# Patient Record
Sex: MALE | Race: WHITE | Employment: FULL TIME | URBAN - METROPOLITAN AREA
[De-identification: names, ages, dates, MRNs, and addresses within clinical notes are randomized per-mention and may not be internally consistent; named-entity substitution may affect disease eponyms.]

---

## 2017-01-03 ENCOUNTER — TELEPHONE (OUTPATIENT)
Dept: INTERNAL MEDICINE CLINIC | Age: 47
End: 2017-01-03

## 2017-01-03 ENCOUNTER — TELEPHONE (OUTPATIENT)
Dept: CARDIOLOGY CLINIC | Age: 47
End: 2017-01-03

## 2017-01-03 NOTE — TELEPHONE ENCOUNTER
Patient would like someone to call concerning his Duplex Carotid Bilateral test he had done on the 12/27/2016

## 2017-01-03 NOTE — TELEPHONE ENCOUNTER
Pt called to inform Dr. Tahira Vaughan that he took atenolol for 3 days but stopped because he felt fatigue, chest discomfort and \"a panicky feeling\". I will forward to Dr. Tahira Vaughan for further advisement.

## 2017-01-06 ENCOUNTER — HOSPITAL ENCOUNTER (OUTPATIENT)
Dept: NON INVASIVE DIAGNOSTICS | Age: 47
Discharge: HOME | End: 2017-01-06
Attending: INTERNAL MEDICINE
Payer: COMMERCIAL

## 2017-01-06 DIAGNOSIS — R06.09 DOE (DYSPNEA ON EXERTION): ICD-10-CM

## 2017-01-06 DIAGNOSIS — R07.9 CHEST PAIN, UNSPECIFIED TYPE: ICD-10-CM

## 2017-01-06 PROCEDURE — 74011250636 HC RX REV CODE- 250/636: Performed by: INTERNAL MEDICINE

## 2017-01-06 PROCEDURE — A9500 TC99M SESTAMIBI: HCPCS

## 2017-01-06 PROCEDURE — C8929 TTE W OR WO FOL WCON,DOPPLER: HCPCS

## 2017-01-06 PROCEDURE — 78452 HT MUSCLE IMAGE SPECT MULT: CPT | Performed by: INTERNAL MEDICINE

## 2017-01-06 PROCEDURE — 93017 CV STRESS TEST TRACING ONLY: CPT | Performed by: INTERNAL MEDICINE

## 2017-01-06 RX ORDER — SODIUM CHLORIDE 9 MG/ML
250 INJECTION, SOLUTION INTRAVENOUS ONCE
Status: COMPLETED | OUTPATIENT
Start: 2017-01-06 | End: 2017-01-06

## 2017-01-06 RX ADMIN — REGADENOSON 0.4 MG: 0.08 INJECTION, SOLUTION INTRAVENOUS at 09:00

## 2017-01-06 RX ADMIN — SODIUM CHLORIDE 250 ML: 900 INJECTION, SOLUTION INTRAVENOUS at 09:00

## 2017-01-06 RX ADMIN — PERFLUTREN 2 ML: 6.52 INJECTION, SUSPENSION INTRAVENOUS at 10:03

## 2017-01-06 NOTE — PROCEDURES
[address]  MEDICINE CARDIAC STRESS    Name:  Daron Michelle  MR#:  [id number]  :  1970  Account #:  [de-identified]  Date of Adm:  2017  Date of Service:  2017      PROCEDURES PERFORMED: Pharmacologic nuclear scan. BASELINE ELECTROCARDIOGRAM: Shows left bundle branch block  pattern. The patient has an IV in the right antecubital space. He received  Lexiscan per protocol. He tolerated the procedure well. No chest pain  was noted. He received resting dose of sestamibi 10.33 mCi at 8:05  a.m. He received stress dose of sestamibi 33.0 mCi at 9:05 a.m. TREADMILL CONCLUSIONS  1. ST segment changes: None. 2. Chest pain: None. 3. Dysrhythmia: None. 4. Both heart rate and blood pressure response are normal.    TREADMILL CONCLUSION: This is a nondiagnostic pharmacologic  stress test from an electrocardiographic standpoint due to underlying  left bundle branch block pattern. MYOCARDIAL NUCLEAR PERFUSION STUDY FINDINGS  1. Perfusion imaging shows mostly diminished uptake in the inferior  wall noticeable during both stress phase and rest phase. This can be  consistent with prior inferior injury versus diaphragmatic attenuation. There was no significant reversibility noted. 2. Gated SPECT imaging shows normal left ventricular chamber size  and mildly diminished LV function with estimated ejection fraction of  43%. There is inferior hypokinesis noted. CONCLUSIONS  1. Nondiagnostic pharmacologic stress test from an electric  electrocardiographic standpoint. 2. Perfusion imaging shows no significant reversibility; however, there  is mostly fixed inferior defect, which can be consistent with prior inferior  injury versus diaphragmatic attenuation. 3. Gated SPECT imaging shows normal left ventricular chamber size  and mildly diminished left ventricular function with estimated ejection  fraction of 43%. There is inferior hypokinesis noted.   4. This is an intermediate to high risk finding.         Eulalio Dance, MD Areta Earthly / Riley.Phyllis  D:  01/06/2017   15:04  T:  01/06/2017   16:13  Job #:  [id number]

## 2017-01-06 NOTE — PROGRESS NOTES
Complete 2D echocardiogram study was performed on patient. Report to follow. Patient armband was removed before discharging.

## 2017-01-06 NOTE — PROGRESS NOTES
Patient was given 10.33 mCi of Sestamibi for the Resting pictures. Patient received 0.4 mg of Lexiscan for the exercise portion of the Stress test. Patient was then given 33 mCi of Sestamibi for the Stress pictures. Armband was removed and disposed of before the patient left.

## 2017-01-07 LAB
ATTENDING PHYSICIAN, CST07: NORMAL
DIAGNOSIS, 93000: NORMAL
DUKE TM SCORE RESULT, CST14: NORMAL
DUKE TREADMILL SCORE, CST13: NORMAL
ECG INTERP BEFORE EX, CST11: NORMAL
ECG INTERP DURING EX, CST12: NORMAL
FUNCTIONAL CAPACITY, CST17: NORMAL
KNOWN CARDIAC CONDITION, CST08: NORMAL
MAX. DIASTOLIC BP, CST04: 77 MMHG
MAX. HEART RATE, CST05: 112 BPM
MAX. SYSTOLIC BP, CST03: 159 MMHG
OVERALL BP RESPONSE TO EXERCISE, CST16: NORMAL
OVERALL HR RESPONSE TO EXERCISE, CST15: NORMAL
PEAK EX METS, CST10: 1 METS
PROTOCOL NAME, CST01: NORMAL
TEST INDICATION, CST09: NORMAL

## 2017-01-11 NOTE — PROGRESS NOTES
Per your last note \" Chest pain. There is definite an exertional component. He did have normal coronaries on a cardiac catheterization in 2012, however, with his new symptoms over the past month, recommend a pharmacologic nuclear stress test for further risk stratification. This may be all related to elevated blood pressure as well since he was just started on the amlodipine a month ago.

## 2017-01-11 NOTE — PROGRESS NOTES
Per your last note \" Dyspnea on exertion. A cardiac etiology will be evaluated for with the stress test as described above and also an echocardiogram will be arranged to reevaluate his PA pressures.

## 2017-01-17 DIAGNOSIS — I10 ESSENTIAL HYPERTENSION: ICD-10-CM

## 2017-01-17 RX ORDER — AMLODIPINE BESYLATE 10 MG/1
10 TABLET ORAL DAILY
Qty: 30 TAB | Refills: 0 | Status: SHIPPED | OUTPATIENT
Start: 2017-01-17 | End: 2017-02-16 | Stop reason: ALTCHOICE

## 2017-01-17 NOTE — TELEPHONE ENCOUNTER
Requested Prescriptions     Pending Prescriptions Disp Refills    amLODIPine (NORVASC) 10 mg tablet 30 Tab 0     Sig: Take 1 Tab by mouth daily.

## 2017-01-23 ENCOUNTER — TELEPHONE (OUTPATIENT)
Dept: CARDIOLOGY CLINIC | Age: 47
End: 2017-01-23

## 2017-01-23 NOTE — TELEPHONE ENCOUNTER
----- Message from Mayte Cain MD sent at 1/12/2017  1:11 PM EST -----  Please let the patient know that his heart function was lower limits of normal on his studies. There is no evidence of ischemia on his stress test  ----- Message -----     From: Quinn Garcia RN     Sent: 1/11/2017   7:16 AM       To: Mayte Cain MD    Per your last note \" Dyspnea on exertion.  A cardiac etiology will be evaluated for with the stress test as described above and also an echocardiogram will be arranged to reevaluate his PA pressures.

## 2017-01-23 NOTE — TELEPHONE ENCOUNTER
Pt aware of results    Pt states he is still having issues with waking up with his heart pounding and also having spikes in his blood pressure.    I will forward this information to Dr Jace Su

## 2017-02-10 ENCOUNTER — OFFICE VISIT (OUTPATIENT)
Dept: INTERNAL MEDICINE CLINIC | Age: 47
End: 2017-02-10

## 2017-02-10 ENCOUNTER — HOSPITAL ENCOUNTER (OUTPATIENT)
Dept: LAB | Age: 47
Discharge: HOME | End: 2017-02-10
Payer: COMMERCIAL

## 2017-02-10 VITALS
TEMPERATURE: 97 F | BODY MASS INDEX: 35.82 KG/M2 | OXYGEN SATURATION: 97 % | HEIGHT: 65 IN | WEIGHT: 215 LBS | DIASTOLIC BLOOD PRESSURE: 98 MMHG | HEART RATE: 83 BPM | RESPIRATION RATE: 16 BRPM | SYSTOLIC BLOOD PRESSURE: 160 MMHG

## 2017-02-10 DIAGNOSIS — G89.29 CHRONIC RIGHT-SIDED LOW BACK PAIN WITHOUT SCIATICA: ICD-10-CM

## 2017-02-10 DIAGNOSIS — M54.50 CHRONIC RIGHT-SIDED LOW BACK PAIN WITHOUT SCIATICA: ICD-10-CM

## 2017-02-10 DIAGNOSIS — R60.0 BILATERAL EDEMA OF LOWER EXTREMITY: Primary | ICD-10-CM

## 2017-02-10 DIAGNOSIS — I10 ESSENTIAL HYPERTENSION: ICD-10-CM

## 2017-02-10 LAB
APPEARANCE UR: CLEAR
BACTERIA URNS QL MICRO: NEGATIVE /HPF
BILIRUB UR QL: NEGATIVE
COLOR UR: YELLOW
EPITH CASTS URNS QL MICRO: NORMAL /LPF (ref 0–5)
GLUCOSE UR STRIP.AUTO-MCNC: NEGATIVE MG/DL
HGB UR QL STRIP: NEGATIVE
KETONES UR QL STRIP.AUTO: NEGATIVE MG/DL
LEUKOCYTE ESTERASE UR QL STRIP.AUTO: NEGATIVE
NITRITE UR QL STRIP.AUTO: NEGATIVE
PH UR STRIP: 6.5 [PH] (ref 5–8)
PROT UR STRIP-MCNC: NEGATIVE MG/DL
RBC #/AREA URNS HPF: NORMAL /HPF (ref 0–5)
SP GR UR REFRACTOMETRY: 1.01 (ref 1–1.03)
UROBILINOGEN UR QL STRIP.AUTO: 0.2 EU/DL (ref 0.2–1)
WBC URNS QL MICRO: NORMAL /HPF (ref 0–4)

## 2017-02-10 PROCEDURE — 81001 URINALYSIS AUTO W/SCOPE: CPT | Performed by: INTERNAL MEDICINE

## 2017-02-10 RX ORDER — BENAZEPRIL HYDROCHLORIDE AND HYDROCHLOROTHIAZIDE 20; 12.5 MG/1; MG/1
TABLET ORAL
Qty: 30 TAB | Refills: 2 | Status: SHIPPED | OUTPATIENT
Start: 2017-02-10 | End: 2017-07-20 | Stop reason: CLARIF

## 2017-02-10 RX ORDER — LABETALOL 100 MG/1
TABLET, FILM COATED ORAL
Qty: 60 TAB | Refills: 2 | Status: SHIPPED | OUTPATIENT
Start: 2017-02-10 | End: 2017-07-31 | Stop reason: SDUPTHER

## 2017-02-10 NOTE — PROGRESS NOTES
1. Have you been to the ER, urgent care clinic since your last visit? Hospitalized since your last visit? No    2. Have you seen or consulted any other health care providers outside of the [address] [place] [address] since your last visit? Include any pap smears or colon screening.  No

## 2017-02-10 NOTE — PROGRESS NOTES
The patient presents to the office today with the chief complaint of LE Swelling    HPI    The patient remains on medications for Hypertension. he is tolerating the medications well but he has developed swelling in his legs since on Norvasc. The patient also have chronic aching in his right lower back. .      Review of Systems   Respiratory: Negative for shortness of breath. Cardiovascular: Positive for leg swelling. Negative for chest pain. Musculoskeletal: Positive for back pain. Allergies   Allergen Reactions    Iodine Unknown (comments)       Current Outpatient Prescriptions   Medication Sig Dispense Refill    labetalol (NORMODYNE) 100 mg tablet 1 twice per day 60 Tab 2    benazepril-hydroCHLOROthiazide (LOTENSIN HCT) 20-12.5 mg per tablet 1 tablet daily 30 Tab 2    amLODIPine (NORVASC) 10 mg tablet Take 1 Tab by mouth daily. 30 Tab 0    aspirin delayed-release 81 mg tablet Take  by mouth daily.  magnesium 250 mg tab Take  by mouth.  b complex vitamins (B COMPLEX 1) tablet Take 1 Tab by mouth daily.  IBUPROFEN PO Take  by mouth as needed. Past Medical History   Diagnosis Date    Acute sinusitis     BBB (bundle branch block)     Bursitis disorder     Cardiac cath 07/30/2012     R dom. Patent coronary arteries. LVEDP 15.  RA 10.  RV 32/10. PA 32/18. Mean PASP 22. W 14.  CO 5.7 (TD). High CO & high O2 sats on right raise question of peripheral arterial venous shunting.  Cardiac echocardiogram 06/27/2012     EF 50-55%. Dyssynergic septal motion c/w LBBB. RVSP/PASP 50-55. Mild LAE. Mild-mod TR. Mild IVCE. 30 x 5-mm echodensity in interventricular groove (poss fibrinous deposit). Sm pericardial effus at apex.  Hx of cardiac cath      Braeden's Daughters at about 12years old.     Obesity     Other ill-defined conditions(799.89)      heart cath 2012    Skin lesion     Umbilical hernia without obstruction or gangrene     Unspecified sleep apnea      no diagnosed       Past Surgical History   Procedure Laterality Date    Hx heent       dental extract    Hx heart catheterization  07/30/12       Social History     Social History    Marital status: SINGLE     Spouse name: N/A    Number of children: N/A    Years of education: N/A     Occupational History    Not on file. Social History Main Topics    Smoking status: Never Smoker    Smokeless tobacco: Never Used    Alcohol use 0.0 oz/week     2 - 3 Standard drinks or equivalent per week    Drug use: No    Sexual activity: Yes     Other Topics Concern    Not on file     Social History Narrative       Patient does not have an advanced directive on file    Visit Vitals    BP (!) 160/98 (BP 1 Location: Left arm, BP Patient Position: Sitting)    Pulse 83    Temp 97 °F (36.1 °C) (Tympanic)    Resp 16    Ht 5' 5\" (1.651 m)    Wt 215 lb (97.5 kg)    SpO2 97%    BMI 35.78 kg/m2       Physical Exam   Neck: No thyromegaly present. Cardiovascular: Normal rate and regular rhythm. Exam reveals no gallop. No murmur heard. Pulmonary/Chest: He has no wheezes. He has no rales. Musculoskeletal: He exhibits edema (2+ edema of both legs). Lymphadenopathy:     He has no cervical adenopathy. BMI:  I have reviewed/discussed the above normal BMI with the patient. I have recommended the following interventions: dietary management education, guidance, and counseling . The plan is as follows: I have counseled this patient on diet and exercise regimens. [facility] Outpatient Visit on 02/10/2017   Component Date Value Ref Range Status    Color 02/10/2017 YELLOW    Final    Appearance 02/10/2017 CLEAR    Final    Specific gravity 02/10/2017 1.008  1.005 - 1.030   Final    pH (UA) 02/10/2017 6.5  5.0 - 8.0   Final    Protein 02/10/2017 NEGATIVE   NEG mg/dL Final    Glucose 02/10/2017 NEGATIVE   NEG mg/dL Final    Ketone 02/10/2017 NEGATIVE   NEG mg/dL Final    Bilirubin 02/10/2017 NEGATIVE   NEG Final    Blood 02/10/2017 NEGATIVE   NEG   Final    Urobilinogen 02/10/2017 0.2  0.2 - 1.0 EU/dL Final    Nitrites 02/10/2017 NEGATIVE   NEG   Final    Leukocyte Esterase 02/10/2017 NEGATIVE   NEG   Final    WBC 02/10/2017 NONE  0 - 4 /hpf Final    RBC 02/10/2017 NONE  0 - 5 /hpf Final    Epithelial cells 02/10/2017 FEW  0 - 5 /lpf Final    Bacteria 02/10/2017 NEGATIVE   NEG /hpf Final   Hospital Outpatient Visit on 01/06/2017   Component Date Value Ref Range Status    Test indication 01/06/2017 Chest Pain, PONCE   Preliminary    Overall HR response to exercise 01/06/2017 appropriate   Preliminary    Overall BP response to exercise 01/06/2017 normal resting BP - appropriate response   Preliminary    Max. Systolic BP 40/08/0396 794  mmHg Preliminary    Max. Diastolic BP 16/11/9774 77  mmHg Preliminary    Max. Heart rate 01/06/2017 112  BPM Preliminary    Peak Ex METs 01/06/2017 1.0  METS Preliminary    Protocol name 01/06/2017 LEXISCAN N/E       Preliminary       Assessment / Plan      ICD-10-CM ICD-9-CM    1. Bilateral edema of lower extremity R60.0 782.3    2. Chronic right-sided low back pain without sciatica M54.5 724.2 URINALYSIS W/MICROSCOPIC    G89.29 338.29    3. Essential hypertension I10 401.9      Check UA to further low back pain  Discontinue Norvasc  Begin Labetalol and Benazepril HCT    Follow-up Disposition:  Return in about 4 months (around 6/10/2017). I asked Butch Proctor if he has any questions and I answered the questions. Butch Young.  Jd Proctor states that he understands the treatment plan and agrees with the treatment plan

## 2017-02-10 NOTE — MR AVS SNAPSHOT
Visit Information Date & Time Provider Department Dept. Phone Encounter #  
 2/10/2017 12:30 PM Andrea Hewitt MD [facility] MEDICINE OF Kylie Doran [phone] [id number] Follow-up Instructions Return in about 4 days (around 2/14/2017). Your Appointments 3/1/2017  9:40 AM  
Follow Up with Rosemary Alcocer MD  
[facility] ([address]) Appt Note: Follow up in 2 mos [facility] [address] [address] [address] [phone] [address] Upcoming Health Maintenance Date Due DTaP/Tdap/Td series (1 - Tdap) 11/9/1992 Allergies as of 2/10/2017  Review Complete On: 2/10/2017 By: Andrea Hewitt MD  
  
 Severity Noted Reaction Type Reactions Iodine  06/21/2012    Unknown (comments) Current Immunizations  Never Reviewed No immunizations on file. Not reviewed this visit You Were Diagnosed With   
  
 Codes Comments Chronic right-sided low back pain without sciatica    -  Primary ICD-10-CM: M54.5, G89.29 ICD-9-CM: 724.2, 338.29 Vitals BP Pulse Temp Resp Height(growth percentile) Weight(growth percentile) (!) 160/98 (BP 1 Location: Left arm, BP Patient Position: Sitting) 83 97 °F (36.1 °C) (Tympanic) 16 5' 5\" (1.651 m) 215 lb (97.5 kg) SpO2 BMI Smoking Status 97% 35.78 kg/m2 Never Smoker Vitals History BMI and BSA Data Body Mass Index Body Surface Area 35.78 kg/m 2 2.11 m 2 Preferred Pharmacy Pharmacy Name Phone [facility] Your Updated Medication List  
  
   
This list is accurate as of: 2/10/17  1:26 PM.  Always use your most recent med list. amLODIPine 10 mg tablet Commonly known as:  Sofia Pace Take 1 Tab by mouth daily. aspirin delayed-release 81 mg tablet Take  by mouth daily. B COMPLEX 1 tablet Generic drug:  b complex vitamins Take 1 Tab by mouth daily. benazepril-hydroCHLOROthiazide 20-12.5 mg per tablet Commonly known as:  LOTENSIN HCT  
1 tablet daily IBUPROFEN PO Take  by mouth as needed. labetalol 100 mg tablet Commonly known as:  NORMODYNE  
1 twice per day  
  
 magnesium 250 mg Tab Take  by mouth. Prescriptions Sent to Pharmacy Refills  
 labetalol (NORMODYNE) 100 mg tablet 2 Si twice per day Class: Normal  
 Pharmacy: [address], [address] [place] [address] Ph #: [phone]  
 benazepril-hydroCHLOROthiazide (LOTENSIN HCT) 20-12.5 mg per tablet 2 Si tablet daily Class: Normal  
 Pharmacy: [address], [facility] Ph #: [phone] Follow-up Instructions Return in about 4 days (around 2017). To-Do List   
 02/10/2017 Lab:  URINALYSIS W/MICROSCOPIC Patient Instructions Health Maintenance Due Topic Date Due  
 DTaP/Tdap/Td  (1 - Tdap) 1992 Introducing [facility]! Ly Crandall introduces [employer] patient portal. Now you can access parts of your medical record, email your doctor's office, and request medication refills online. 1. In your internet browser, go to [email or web address] 2. Click on the First Time User? Click Here link in the Sign In box. You will see the New Member Sign Up page. 3. Enter your [employer] Access Code exactly as it appears below. You will not need to use this code after youve completed the sign-up process. If you do not sign up before the expiration date, you must request a new code. · [employer] Access Code: [email or web address] Expires: 2017  4:33 PM 
 
4. Enter the last four digits of your Social Security Number (xxxx) and Date of Birth (mm/dd/yyyy) as indicated and click Submit.  You will be taken to the next sign-up page. 5. Create a [employer]Â® ID. This will be your [employer] login ID and cannot be changed, so think of one that is secure and easy to remember. 6. Create a [employer]® password. You can change your password at any time. 7. Enter your Password Reset Question and Answer. This can be used at a later time if you forget your password. 8. Enter your e-mail address. You will receive e-mail notification when new information is available in [address] 9. Click Sign Up. You can now view and download portions of your medical record. 10. Click the Download Summary menu link to download a portable copy of your medical information. If you have questions, please visit the Frequently Asked Questions section of the [employer]® website. Remember, [employer]® is NOT to be used for urgent needs. For medical emergencies, dial 911. Now available from your iPhone and Android! Please provide this summary of care documentation to your next provider. Your primary care clinician is listed as Valeria Reyes. If you have any questions after today's visit, please call [phone].

## 2017-02-16 ENCOUNTER — OFFICE VISIT (OUTPATIENT)
Dept: INTERNAL MEDICINE CLINIC | Age: 47
End: 2017-02-16

## 2017-02-16 VITALS
HEART RATE: 68 BPM | OXYGEN SATURATION: 97 % | BODY MASS INDEX: 36.32 KG/M2 | SYSTOLIC BLOOD PRESSURE: 118 MMHG | WEIGHT: 218 LBS | TEMPERATURE: 97.8 F | HEIGHT: 65 IN | DIASTOLIC BLOOD PRESSURE: 72 MMHG

## 2017-02-16 DIAGNOSIS — I10 ESSENTIAL HYPERTENSION: Primary | ICD-10-CM

## 2017-02-16 DIAGNOSIS — R60.0 BILATERAL EDEMA OF LOWER EXTREMITY: ICD-10-CM

## 2017-02-17 NOTE — PROGRESS NOTES
The patient presents to the office today with the chief complaint of hypertension    HPI    The patient remains on medications for hypertension. he is tolerating the medications well. The patient had LE swelling on Norvasc. This has greatly improved off Norvasc. The patient has no complaints. Review of Systems   Respiratory: Negative for shortness of breath. Cardiovascular: Positive for leg swelling (Greatly improved). Negative for chest pain. Allergies   Allergen Reactions    Iodine Unknown (comments)       Current Outpatient Prescriptions   Medication Sig Dispense Refill    labetalol (NORMODYNE) 100 mg tablet 1 twice per day 60 Tab 2    benazepril-hydroCHLOROthiazide (LOTENSIN HCT) 20-12.5 mg per tablet 1 tablet daily 30 Tab 2    aspirin delayed-release 81 mg tablet Take  by mouth daily.  magnesium 250 mg tab Take  by mouth.  b complex vitamins (B COMPLEX 1) tablet Take 1 Tab by mouth daily.  IBUPROFEN PO Take  by mouth as needed. Past Medical History   Diagnosis Date    Acute sinusitis     BBB (bundle branch block)     Bursitis disorder     Cardiac cath 07/30/2012     R dom. Patent coronary arteries. LVEDP 15.  RA 10.  RV 32/10. PA 32/18. Mean PASP 22. W 14.  CO 5.7 (TD). High CO & high O2 sats on right raise question of peripheral arterial venous shunting.  Cardiac echocardiogram 06/27/2012     EF 50-55%. Dyssynergic septal motion c/w LBBB. RVSP/PASP 50-55. Mild LAE. Mild-mod TR. Mild IVCE. 30 x 5-mm echodensity in interventricular groove (poss fibrinous deposit). Sm pericardial effus at apex.  Hx of cardiac cath      Braeden's Daughters at about 12years old.     Obesity     Other ill-defined conditions(799.89)      heart cath 2012    Skin lesion     Umbilical hernia without obstruction or gangrene     Unspecified sleep apnea      no diagnosed       Past Surgical History   Procedure Laterality Date    Hx heent       dental extract    Hx heart catheterization  07/30/12       Social History     Social History    Marital status: SINGLE     Spouse name: N/A    Number of children: N/A    Years of education: N/A     Occupational History    Not on file. Social History Main Topics    Smoking status: Never Smoker    Smokeless tobacco: Never Used    Alcohol use 0.0 oz/week     2 - 3 Standard drinks or equivalent per week    Drug use: No    Sexual activity: Yes     Other Topics Concern    Not on file     Social History Narrative       Patient does not have an advanced directive on file    Visit Vitals    /72 (BP 1 Location: Right arm, BP Patient Position: Sitting)    Pulse 68    Temp 97.8 °F (36.6 °C) (Tympanic)    Ht 5' 5\" (1.651 m)    Wt 218 lb (98.9 kg)    SpO2 97%    BMI 36.28 kg/m2       Physical Exam   No Cervical Lymphadenopathy  No Supraclavicular Lymphadenopathy  Thyroid is Normal  Lungs are clear to ausculation and percussion  Heart:  S1 S2 are normal, No gallops, No mummers  No Carotid Bruits  Abdomen:  Normal Bowel Sounds. No masses. No Hepatomegaly or Splenomegly  LE:  Strong Pedal Pulses. Trace Edema      BMI:  I have reviewed/discussed the above normal BMI with the patient. I have recommended the following interventions: dietary management education, guidance, and counseling . The plan is as follows: I have counseled this patient on diet and exercise regimens. [facility] Outpatient Visit on 02/10/2017   Component Date Value Ref Range Status    Color 02/10/2017 YELLOW    Final    Appearance 02/10/2017 CLEAR    Final    Specific gravity 02/10/2017 1.008  1.005 - 1.030   Final    pH (UA) 02/10/2017 6.5  5.0 - 8.0   Final    Protein 02/10/2017 NEGATIVE   NEG mg/dL Final    Glucose 02/10/2017 NEGATIVE   NEG mg/dL Final    Ketone 02/10/2017 NEGATIVE   NEG mg/dL Final    Bilirubin 02/10/2017 NEGATIVE   NEG   Final    Blood 02/10/2017 NEGATIVE   NEG   Final    Urobilinogen 02/10/2017 0.2  0.2 - 1.0 EU/dL Final    Nitrites 02/10/2017 NEGATIVE   NEG   Final    Leukocyte Esterase 02/10/2017 NEGATIVE   NEG   Final    WBC 02/10/2017 NONE  0 - 4 /hpf Final    RBC 02/10/2017 NONE  0 - 5 /hpf Final    Epithelial cells 02/10/2017 FEW  0 - 5 /lpf Final    Bacteria 02/10/2017 NEGATIVE   NEG /hpf Final   Hospital Outpatient Visit on 01/06/2017   Component Date Value Ref Range Status    Test indication 01/06/2017 Chest Pain, PONCE   Preliminary    Overall HR response to exercise 01/06/2017 appropriate   Preliminary    Overall BP response to exercise 01/06/2017 normal resting BP - appropriate response   Preliminary    Max. Systolic BP 19/24/6519 978  mmHg Preliminary    Max. Diastolic BP 45/28/2314 77  mmHg Preliminary    Max. Heart rate 01/06/2017 112  BPM Preliminary    Peak Ex METs 01/06/2017 1.0  METS Preliminary    Protocol name 01/06/2017 LEXISCAN N/E       Preliminary       . No results found for any visits on 02/16/17. Assessment / Plan      ICD-10-CM ICD-9-CM    1. Essential hypertension I10 401.9    2. Bilateral edema of lower extremity R60.0 782.3      he was advised to continue his maintenance medications    Follow-up Disposition:  Return in about 6 months (around 8/16/2017). I asked Kandis Ross if he has any questions and I answered the questions. Kandis Ross states that he understands the treatment plan and agrees with the treatment plan

## 2017-07-20 ENCOUNTER — HOSPITAL ENCOUNTER (OUTPATIENT)
Dept: LAB | Age: 47
Discharge: HOME | End: 2017-07-20
Payer: COMMERCIAL

## 2017-07-20 ENCOUNTER — OFFICE VISIT (OUTPATIENT)
Dept: INTERNAL MEDICINE CLINIC | Age: 47
End: 2017-07-20

## 2017-07-20 VITALS
SYSTOLIC BLOOD PRESSURE: 148 MMHG | TEMPERATURE: 98.5 F | BODY MASS INDEX: 35.65 KG/M2 | RESPIRATION RATE: 16 BRPM | OXYGEN SATURATION: 98 % | DIASTOLIC BLOOD PRESSURE: 78 MMHG | HEIGHT: 65 IN | HEART RATE: 82 BPM | WEIGHT: 214 LBS

## 2017-07-20 DIAGNOSIS — I10 ESSENTIAL HYPERTENSION: Primary | ICD-10-CM

## 2017-07-20 DIAGNOSIS — R19.5 ORANGE STOOL: ICD-10-CM

## 2017-07-20 LAB
ALBUMIN SERPL BCP-MCNC: 4.2 G/DL (ref 3.4–5)
ALBUMIN/GLOB SERPL: 1.4 {RATIO} (ref 0.8–1.7)
ALP SERPL-CCNC: 70 U/L (ref 45–117)
ALT SERPL-CCNC: 54 U/L (ref 16–61)
ANION GAP BLD CALC-SCNC: 10 MMOL/L (ref 3–18)
AST SERPL W P-5'-P-CCNC: 25 U/L (ref 15–37)
BILIRUB SERPL-MCNC: 0.4 MG/DL (ref 0.2–1)
BUN SERPL-MCNC: 15 MG/DL (ref 7–18)
BUN/CREAT SERPL: 16 (ref 12–20)
CALCIUM SERPL-MCNC: 9.3 MG/DL (ref 8.5–10.1)
CHLORIDE SERPL-SCNC: 101 MMOL/L (ref 100–108)
CO2 SERPL-SCNC: 26 MMOL/L (ref 21–32)
CREAT SERPL-MCNC: 0.96 MG/DL (ref 0.6–1.3)
GLOBULIN SER CALC-MCNC: 3.1 G/DL (ref 2–4)
GLUCOSE SERPL-MCNC: 165 MG/DL (ref 74–99)
POTASSIUM SERPL-SCNC: 4.2 MMOL/L (ref 3.5–5.5)
PROT SERPL-MCNC: 7.3 G/DL (ref 6.4–8.2)
SODIUM SERPL-SCNC: 137 MMOL/L (ref 136–145)

## 2017-07-20 PROCEDURE — 80053 COMPREHEN METABOLIC PANEL: CPT | Performed by: NURSE PRACTITIONER

## 2017-07-20 RX ORDER — VALSARTAN AND HYDROCHLOROTHIAZIDE 80; 12.5 MG/1; MG/1
1 TABLET, FILM COATED ORAL DAILY
Qty: 30 TAB | Refills: 1 | Status: SHIPPED | OUTPATIENT
Start: 2017-07-20 | End: 2017-09-20 | Stop reason: SDUPTHER

## 2017-07-20 NOTE — PROGRESS NOTES
Patient presents for   Chief Complaint   Patient presents with    Hypertension     Fall risk assessment was not indicated. Depression screening was not indicated Follow up questions were not indicated. 1. Have you been to the ER, urgent care clinic since your last visit? Hospitalized since your last visit? No    2. Have you seen or consulted any other health care providers outside of the [address] [place] [address] since your last visit? Include any pap smears or colon screening.  No

## 2017-07-20 NOTE — LETTER
NOTIFICATION RETURN TO WORK / SCHOOL 
 
7/20/2017 1:07 PM 
 
Mr. Myesha Wheeler [address] [place] [id number] To Whom It May Concern: Myesha Wheeler is currently under the care of [address] [place] [address] [place]. He will return to work/school on: 07/20/2017 If there are questions or concerns please have the patient contact our office.  
 
 
 
Sincerely, 
 
 
 
 
 
Maisha Chatman NP

## 2017-07-20 NOTE — PROGRESS NOTES
Cesar Alves is a 40 y.o. male presenting today for Hypertension  . HPI:  Cesar Alves presents to the office today for hypertension follow-up care. Patient noted this a.m. His b/p was 180/80. Patient denied any chest pain, palpitations or dyspnea. Patient noted he has been having intermittent orange stools for the last 2 months. Patient admits to regularly drinking ETOH. He is negative for abdominal pain. Review of Systems   Constitutional: Negative for fever. Respiratory: Negative for cough and hemoptysis. Cardiovascular: Negative for chest pain and palpitations. Gastrointestinal: Negative for abdominal pain, constipation, nausea and vomiting. Neurological: Negative for dizziness and headaches. Allergies   Allergen Reactions    Iodine Unknown (comments)       Current Outpatient Prescriptions   Medication Sig Dispense Refill    valsartan-hydroCHLOROthiazide (DIOVAN-HCT) 80-12.5 mg per tablet Take 1 Tab by mouth daily. 30 Tab 1    labetalol (NORMODYNE) 100 mg tablet 1 twice per day 60 Tab 2    aspirin delayed-release 81 mg tablet Take  by mouth daily.  magnesium 250 mg tab Take  by mouth.  b complex vitamins (B COMPLEX 1) tablet Take 1 Tab by mouth daily.  IBUPROFEN PO Take  by mouth as needed. Past Medical History:   Diagnosis Date    Acute sinusitis     BBB (bundle branch block)     Bursitis disorder     Cardiac cath 07/30/2012    R dom. Patent coronary arteries. LVEDP 15.  RA 10.  RV 32/10. PA 32/18. Mean PASP 22. W 14.  CO 5.7 (TD). High CO & high O2 sats on right raise question of peripheral arterial venous shunting.  Cardiac echocardiogram 06/27/2012    EF 50-55%. Dyssynergic septal motion c/w LBBB. RVSP/PASP 50-55. Mild LAE. Mild-mod TR. Mild IVCE. 30 x 5-mm echodensity in interventricular groove (poss fibrinous deposit). Sm pericardial effus at apex.  Hx of cardiac cath     Braeden's Daughters at about 12years old.     Obesity     Other ill-defined conditions     heart cath 2012    Skin lesion     Umbilical hernia without obstruction or gangrene     Unspecified sleep apnea     no diagnosed       Past Surgical History:   Procedure Laterality Date    HX HEART CATHETERIZATION  07/30/12    HX HEENT      dental extract       Social History     Social History    Marital status: SINGLE     Spouse name: N/A    Number of children: N/A    Years of education: N/A     Occupational History    Not on file. Social History Main Topics    Smoking status: Never Smoker    Smokeless tobacco: Never Used    Alcohol use 0.0 oz/week     2 - 3 Standard drinks or equivalent per week    Drug use: No    Sexual activity: Yes     Other Topics Concern    Not on file     Social History Narrative       Patient does not have an advanced directive on file    Vitals:    07/20/17 1214   BP: 148/78   Pulse: 82   Resp: 16   Temp: 98.5 °F (36.9 °C)   TempSrc: Tympanic   SpO2: 98%   Weight: 214 lb (97.1 kg)   Height: 5' 5\" (1.651 m)   PainSc:   0 - No pain       Physical Exam   Constitutional: No distress. Cardiovascular: Normal rate, regular rhythm and normal heart sounds. Pulmonary/Chest: Effort normal and breath sounds normal.   Abdominal: Soft. Bowel sounds are normal. There is no tenderness. Musculoskeletal: He exhibits no edema or tenderness. No visits with results within 3 Month(s) from this visit.   Latest known visit with results is:    Hospital Outpatient Visit on 02/10/2017   Component Date Value Ref Range Status    Color 02/10/2017 YELLOW    Final    Appearance 02/10/2017 CLEAR    Final    Specific gravity 02/10/2017 1.008  1.005 - 1.030   Final    pH (UA) 02/10/2017 6.5  5.0 - 8.0   Final    Protein 02/10/2017 NEGATIVE   NEG mg/dL Final    Glucose 02/10/2017 NEGATIVE   NEG mg/dL Final    Ketone 02/10/2017 NEGATIVE   NEG mg/dL Final    Bilirubin 02/10/2017 NEGATIVE   NEG   Final    Blood 02/10/2017 NEGATIVE   NEG   Final    Urobilinogen 02/10/2017 0.2  0.2 - 1.0 EU/dL Final    Nitrites 02/10/2017 NEGATIVE   NEG   Final    Leukocyte Esterase 02/10/2017 NEGATIVE   NEG   Final    WBC 02/10/2017 NONE  0 - 4 /hpf Final    RBC 02/10/2017 NONE  0 - 5 /hpf Final    Epithelial cells 02/10/2017 FEW  0 - 5 /lpf Final    Bacteria 02/10/2017 NEGATIVE   NEG /hpf Final       .No results found for any visits on 07/20/17. Assessment / Plan:      ICD-10-CM ICD-9-CM    1. Essential hypertension I10 401.9 valsartan-hydroCHLOROthiazide (DIOVAN-HCT) 80-12.5 mg per tablet   2. Orange stool P45.2 339.4 METABOLIC PANEL, COMPREHENSIVE      REFERRAL TO GASTROENTEROLOGY     CMP today- r/o bilirubinemia  Stop Benazepril-HCT- cough  Valsartan-hctz  Continue Labetolol  Referral to GI  F/u in 3 weeks      Follow-up Disposition:  Return in about 3 weeks (around 8/10/2017). I asked the patient if he  had any questions and answered his  questions.   The patient stated that he understands the treatment plan and agrees with the treatment plan

## 2017-07-21 ENCOUNTER — TELEPHONE (OUTPATIENT)
Dept: INTERNAL MEDICINE CLINIC | Age: 47
End: 2017-07-21

## 2017-07-21 NOTE — PROGRESS NOTES
Please let the patient know random sugar is elevated. I realize this is not fasting but we will check a Hgb A1C when he return for B/p folllow-up. Yao Harrison

## 2017-07-21 NOTE — TELEPHONE ENCOUNTER
Patient has upcoming appointment with [facility], 8/1 at 8:00 a.m. Unable to leave a message, home voice mail full, mobil voice mail not set up. Will continue trying to contacting patient.

## 2017-07-24 ENCOUNTER — TELEPHONE (OUTPATIENT)
Dept: INTERNAL MEDICINE CLINIC | Age: 47
End: 2017-07-24

## 2017-07-24 NOTE — TELEPHONE ENCOUNTER
I have attempted to contact this patient by phone with the following results: left message to return my call on answering machine.  In reference to labs drawn on 07/20/2017

## 2017-07-24 NOTE — TELEPHONE ENCOUNTER
----- Message from Yaima Hart NP sent at 7/21/2017  2:40 PM EDT -----  Please let the patient know random sugar is elevated. I realize this is not fasting but we will check a Hgb A1C when he return for B/p folllow-up. Jennifer Purvis

## 2017-07-25 NOTE — TELEPHONE ENCOUNTER
Contacted Celia Betancourt and informed Him of lab results and recommendation to perform A1C on next visit per Chanel Thrasher NP's request. Celia Betancourt expressed understanding.

## 2017-08-01 RX ORDER — LABETALOL 100 MG/1
TABLET, FILM COATED ORAL
Qty: 60 TAB | Refills: 2 | Status: SHIPPED | OUTPATIENT
Start: 2017-08-01 | End: 2017-11-13 | Stop reason: SDUPTHER

## 2017-08-01 NOTE — TELEPHONE ENCOUNTER
Requested Prescriptions     Pending Prescriptions Disp Refills    labetalol (NORMODYNE) 100 mg tablet [Pharmacy Med Name: LABETALOL 100MG     TAB] 60 Tab 2     Sig: TAKE ONE TABLET BY MOUTH TWICE DAILY

## 2017-08-10 ENCOUNTER — OFFICE VISIT (OUTPATIENT)
Dept: INTERNAL MEDICINE CLINIC | Age: 47
End: 2017-08-10

## 2017-08-10 VITALS
OXYGEN SATURATION: 98 % | SYSTOLIC BLOOD PRESSURE: 142 MMHG | DIASTOLIC BLOOD PRESSURE: 74 MMHG | HEART RATE: 68 BPM | RESPIRATION RATE: 16 BRPM | HEIGHT: 65 IN | TEMPERATURE: 98.7 F

## 2017-08-10 DIAGNOSIS — J39.2 PHARYNGEAL PAIN: ICD-10-CM

## 2017-08-10 DIAGNOSIS — I10 ESSENTIAL HYPERTENSION: Primary | ICD-10-CM

## 2017-08-10 DIAGNOSIS — R73.09 ELEVATED RANDOM BLOOD GLUCOSE LEVEL: ICD-10-CM

## 2017-08-10 LAB — HBA1C MFR BLD HPLC: 6.8 %

## 2017-08-10 NOTE — MR AVS SNAPSHOT
Visit Information Date & Time Provider Department Dept. Phone Encounter #  
 8/10/2017  4:15 PM Prasad Cardoso NP [facility] INTER[facility]ICINE OF [address] [id number] Your Appointments 11/13/2017  1:00 PM  
Follow Up with Prasad Cardoso NP  
[facility] INTER[facility] MEDICINE OF [place] ([address]) Appt Note: 3 mo f/u  
 [address] [facility], [address] [facility] [address]  
  
   
 [address] [facility], [address] [place] [address] Upcoming Health Maintenance Date Due DTaP/Tdap/Td series (1 - Tdap) 11/9/1992 INFLUENZA AGE 9 TO ADULT 8/1/2017 Allergies as of 8/10/2017  Review Complete On: 7/20/2017 By: Prasad Cardoso NP Severity Noted Reaction Type Reactions Iodine  06/21/2012    Unknown (comments) Current Immunizations  Never Reviewed No immunizations on file. Not reviewed this visit You Were Diagnosed With   
  
 Codes Comments Pharyngeal pain    -  Primary ICD-10-CM: J39.2 ICD-9-CM: 478.29 Elevated random blood glucose level     ICD-10-CM: R73.09 
ICD-9-CM: 790.29 Vitals BP Pulse Temp Resp Height(growth percentile) SpO2  
 142/74 (BP 1 Location: Left arm, BP Patient Position: Sitting) 68 98.7 °F (37.1 °C) (Tympanic) 16 5' 5\" (1.651 m) 98% Smoking Status Never Smoker Preferred Pharmacy Pharmacy Name Phone [facility] [address] [place], [address] Your Updated Medication List  
  
   
This list is accurate as of: 8/10/17  5:13 PM.  Always use your most recent med list.  
  
  
  
  
 aspirin delayed-release 81 mg tablet Take  by mouth daily. B COMPLEX 1 tablet Generic drug:  b complex vitamins Take 1 Tab by mouth daily. IBUPROFEN PO Take  by mouth as needed. labetalol 100 mg tablet Commonly known as:  NORMODYNE  
TAKE ONE TABLET BY MOUTH TWICE DAILY magnesium 250 mg Tab Take  by mouth.  
  
 valsartan-hydroCHLOROthiazide 80-12.5 mg per tablet Commonly known as:  DIOVAN-HCT Take 1 Tab by mouth daily. We Performed the Following AMB POC HEMOGLOBIN A1C [74003 CPT(R)] REFERRAL TO DIETITIAN [SKS47 Custom] Comments:  
 Please evaluate patient for prediabetes/diabetes (Hgb A1c 6.8). Please schedule and authorize patient for services x 2 occurences REFERRAL TO ENT-OTOLARYNGOLOGY [HBY82 Custom] Comments:  
 Please evaluate patient for sensation of something pinching pharynx (right). Referral Information Referral ID Referred By Referred To  
  
 [id number] Not Available Visits Status Start Date End Date 1 New Request 8/10/17 8/10/18 If your referral has a status of pending review or denied, additional information will be sent to support the outcome of this decision. Referral ID Referred By Referred To  
 [id number] [address], [address] [place] [address] [address] [place], [address] Phone: [phone] Fax: [phone] Visits Status Start Date End Date 1 New Request 8/10/17 8/10/18 If your referral has a status of pending review or denied, additional information will be sent to support the outcome of this decision. Introducing [facility]! Jodie Carson introduces [employer] patient portal. Now you can access parts of your medical record, email your doctor's office, and request medication refills online. 1. In your internet browser, go to [email or web address] 2. Click on the First Time User? Click Here link in the Sign In box. You will see the New Member Sign Up page. 3. Enter your [employer] Access Code exactly as it appears below. You will not need to use this code after youve completed the sign-up process. If you do not sign up before the expiration date, you must request a new code. · BDA Access Code: [email or web address] Expires: 10/18/2017 12:19 PM 
 
4. Enter the last four digits of your Social Security Number (xxxx) and Date of Birth (mm/dd/yyyy) as indicated and click Submit. You will be taken to the next sign-up page. 5. Create a BDA ID. This will be your BDA login ID and cannot be changed, so think of one that is secure and easy to remember. 6. Create a BDA password. You can change your password at any time. 7. Enter your Password Reset Question and Answer. This can be used at a later time if you forget your password. 8. Enter your e-mail address. You will receive e-mail notification when new information is available in [address] 9. Click Sign Up. You can now view and download portions of your medical record. 10. Click the Download Summary menu link to download a portable copy of your medical information. If you have questions, please visit the Frequently Asked Questions section of the BDA website. Remember, BDA is NOT to be used for urgent needs. For medical emergencies, dial 911. Now available from your iPhone and Android! Please provide this summary of care documentation to your next provider. Your primary care clinician is listed as John Sapp. If you have any questions after today's visit, please call [phone].

## 2017-08-10 NOTE — PROGRESS NOTES
Ledy Ko is a 40 y.o. male presenting today for Hypertension and High Blood Sugar  . HPI:  Ledy Ko presents to the office today for hypertension and elevated blood glucose. Patient noted he is feeling okay. He is negative for chest pain, palpitations, polyuria or polydipsia. Patient is also complaining of deep oropharyngeal pain x 1 month    Review of Systems   Constitutional: Negative for malaise/fatigue. HENT: Positive for sore throat (deep pharyngeal). Respiratory: Negative for cough. Cardiovascular: Negative for chest pain and palpitations. Gastrointestinal: Negative for abdominal pain. Endo/Heme/Allergies: Negative for polydipsia. Allergies   Allergen Reactions    Iodine Unknown (comments)       Current Outpatient Prescriptions   Medication Sig Dispense Refill    labetalol (NORMODYNE) 100 mg tablet TAKE ONE TABLET BY MOUTH TWICE DAILY 60 Tab 2    valsartan-hydroCHLOROthiazide (DIOVAN-HCT) 80-12.5 mg per tablet Take 1 Tab by mouth daily. 30 Tab 1    aspirin delayed-release 81 mg tablet Take  by mouth daily.  magnesium 250 mg tab Take  by mouth.  b complex vitamins (B COMPLEX 1) tablet Take 1 Tab by mouth daily.  IBUPROFEN PO Take  by mouth as needed. Past Medical History:   Diagnosis Date    Acute sinusitis     BBB (bundle branch block)     Bursitis disorder     Cardiac cath 07/30/2012    R dom. Patent coronary arteries. LVEDP 15.  RA 10.  RV 32/10. PA 32/18. Mean PASP 22. W 14.  CO 5.7 (TD). High CO & high O2 sats on right raise question of peripheral arterial venous shunting.  Cardiac echocardiogram 06/27/2012    EF 50-55%. Dyssynergic septal motion c/w LBBB. RVSP/PASP 50-55. Mild LAE. Mild-mod TR. Mild IVCE. 30 x 5-mm echodensity in interventricular groove (poss fibrinous deposit). Sm pericardial effus at apex.  Hx of cardiac cath     Braeden's Daughters at about 12years old.     Obesity     Other ill-defined conditions heart cath 2012    Skin lesion     Umbilical hernia without obstruction or gangrene     Unspecified sleep apnea     no diagnosed       Past Surgical History:   Procedure Laterality Date    HX HEART CATHETERIZATION  07/30/12    HX HEENT      dental extract       Social History     Social History    Marital status: SINGLE     Spouse name: N/A    Number of children: N/A    Years of education: N/A     Occupational History    Not on file. Social History Main Topics    Smoking status: Never Smoker    Smokeless tobacco: Never Used    Alcohol use 0.0 oz/week     2 - 3 Standard drinks or equivalent per week    Drug use: No    Sexual activity: Yes     Other Topics Concern    Not on file     Social History Narrative       Patient does not have an advanced directive on file    Vitals:    08/10/17 1624   BP: 142/74   Pulse: 68   Resp: 16   Temp: 98.7 °F (37.1 °C)   TempSrc: Tympanic   SpO2: 98%   Height: 5' 5\" (1.651 m)   PainSc:   0 - No pain       Physical Exam   Constitutional: No distress. Cardiovascular: Normal rate and regular rhythm. Pulmonary/Chest: Effort normal and breath sounds normal.   Abdominal: Soft. Musculoskeletal: He exhibits no edema. Nursing note and vitals reviewed.       Office Visit on 08/10/2017   Component Date Value Ref Range Status    Hemoglobin A1c (POC) 08/10/2017 6.8  % Final   Hospital Outpatient Visit on 07/20/2017   Component Date Value Ref Range Status    Sodium 07/20/2017 137  136 - 145 mmol/L Final    Potassium 07/20/2017 4.2  3.5 - 5.5 mmol/L Final    Chloride 07/20/2017 101  100 - 108 mmol/L Final    CO2 07/20/2017 26  21 - 32 mmol/L Final    Anion gap 07/20/2017 10  3.0 - 18 mmol/L Final    Glucose 07/20/2017 165* 74 - 99 mg/dL Final    BUN 07/20/2017 15  7.0 - 18 MG/DL Final    Creatinine 07/20/2017 0.96  0.6 - 1.3 MG/DL Final    BUN/Creatinine ratio 07/20/2017 16  12 - 20   Final    GFR est AA 07/20/2017 >60  >60 ml/min/1.73m2 Final    GFR est non-AA 07/20/2017 >60  >60 ml/min/1.73m2 Final    Comment: (NOTE)  Estimated GFR is calculated using the Modification of Diet in Renal   Disease (MDRD) Study equation, reported for both  Americans   (GFRAA) and non- Americans (GFRNA), and normalized to 1.73m2   body surface area. The physician must decide which value applies to   the patient. The MDRD study equation should only be used in   individuals age 25 or older. It has not been validated for the   following: pregnant women, patients with serious comorbid conditions,   or on certain medications, or persons with extremes of body size,   muscle mass, or nutritional status.  Calcium 07/20/2017 9.3  8.5 - 10.1 MG/DL Final    Bilirubin, total 07/20/2017 0.4  0.2 - 1.0 MG/DL Final    ALT (SGPT) 07/20/2017 54  16 - 61 U/L Final    AST (SGOT) 07/20/2017 25  15 - 37 U/L Final    Alk. phosphatase 07/20/2017 70  45 - 117 U/L Final    Protein, total 07/20/2017 7.3  6.4 - 8.2 g/dL Final    Albumin 07/20/2017 4.2  3.4 - 5.0 g/dL Final    Globulin 07/20/2017 3.1  2.0 - 4.0 g/dL Final    A-G Ratio 07/20/2017 1.4  0.8 - 1.7   Final       .  Results for orders placed or performed in visit on 08/10/17   AMB POC HEMOGLOBIN A1C   Result Value Ref Range    Hemoglobin A1c (POC) 6.8 %       Assessment / Plan:      ICD-10-CM ICD-9-CM    1. Essential hypertension I10 401.9    2. Elevated random blood glucose level R73.09 790.29 AMB POC HEMOGLOBIN A1C      REFERRAL TO DIETITIAN   3. Pharyngeal pain J39.2 478.29 REFERRAL TO ENT-OTOLARYNGOLOGY     HTN improved  Complaints of deep throat pain- Referral to ENT  Hgb A1C elevated  Referral to Dietician- will try lifestyle modification x 3 months  F/u prn    Follow-up Disposition:  Return in about 3 months (around 11/10/2017). I asked the patient if he  had any questions and answered his  questions.   The patient stated that he understands the treatment plan and agrees with the treatment plan

## 2017-09-20 DIAGNOSIS — I10 ESSENTIAL HYPERTENSION: ICD-10-CM

## 2017-09-20 RX ORDER — VALSARTAN AND HYDROCHLOROTHIAZIDE 80; 12.5 MG/1; MG/1
1 TABLET, FILM COATED ORAL DAILY
Qty: 30 TAB | Refills: 1 | Status: SHIPPED | OUTPATIENT
Start: 2017-09-20 | End: 2017-11-17 | Stop reason: SDUPTHER

## 2017-09-20 NOTE — TELEPHONE ENCOUNTER
Requested Prescriptions     Pending Prescriptions Disp Refills    valsartan-hydroCHLOROthiazide (DIOVAN-HCT) 80-12.5 mg per tablet 30 Tab 1     Sig: Take 1 Tab by mouth daily.

## 2017-11-13 ENCOUNTER — OFFICE VISIT (OUTPATIENT)
Dept: INTERNAL MEDICINE CLINIC | Age: 47
End: 2017-11-13

## 2017-11-13 VITALS
SYSTOLIC BLOOD PRESSURE: 130 MMHG | TEMPERATURE: 98.6 F | OXYGEN SATURATION: 98 % | RESPIRATION RATE: 16 BRPM | BODY MASS INDEX: 37.65 KG/M2 | HEIGHT: 65 IN | HEART RATE: 92 BPM | DIASTOLIC BLOOD PRESSURE: 80 MMHG | WEIGHT: 226 LBS

## 2017-11-13 DIAGNOSIS — G62.9 NEUROPATHY: ICD-10-CM

## 2017-11-13 DIAGNOSIS — I10 ESSENTIAL HYPERTENSION: Primary | ICD-10-CM

## 2017-11-13 DIAGNOSIS — R73.9 BLOOD GLUCOSE ELEVATED: ICD-10-CM

## 2017-11-13 DIAGNOSIS — E66.9 OBESITY (BMI 35.0-39.9 WITHOUT COMORBIDITY): ICD-10-CM

## 2017-11-13 RX ORDER — GABAPENTIN 100 MG/1
200 CAPSULE ORAL 2 TIMES DAILY
Qty: 60 CAP | Refills: 1 | Status: SHIPPED | OUTPATIENT
Start: 2017-11-13 | End: 2017-12-11 | Stop reason: SDUPTHER

## 2017-11-13 NOTE — PROGRESS NOTES
1. Have you been to the ER, urgent care clinic since your last visit? Hospitalized since your last visit? No    2. Have you seen or consulted any other health care providers outside of the [address] [place] [address] since your last visit? Include any pap smears or colon screening.  Yes When: sept 22 Where: gastroendonologigt Reason for visit: conloscopy

## 2017-11-13 NOTE — PROGRESS NOTES
Amadeo Mcmahon is a 56 y.o. male presenting today for Diabetes  . HPI:  Amadeo Mcmahon presents to the office today for elevated blood sugar and hypertension follow-up care. Patient denies any chest pain or dyspnea. Patient is complaining of burning and sharp needle sensations in his feet with mild intermittent numbness. Review of Systems   Respiratory: Negative for cough, sputum production and shortness of breath. Cardiovascular: Negative for chest pain, palpitations and leg swelling. Gastrointestinal: Negative for abdominal pain, constipation, diarrhea, nausea and vomiting. Neurological: Positive for tingling (bilateral feet) and sensory change. Allergies   Allergen Reactions    Iodine Unknown (comments)       Current Outpatient Prescriptions   Medication Sig Dispense Refill    gabapentin (NEURONTIN) 100 mg capsule Take 2 Caps by mouth two (2) times a day. 60 Cap 1    valsartan-hydroCHLOROthiazide (DIOVAN-HCT) 80-12.5 mg per tablet Take 1 Tab by mouth daily. 30 Tab 1    labetalol (NORMODYNE) 100 mg tablet TAKE ONE TABLET BY MOUTH TWICE DAILY 60 Tab 2    aspirin delayed-release 81 mg tablet Take  by mouth daily.  b complex vitamins (B COMPLEX 1) tablet Take 1 Tab by mouth daily.  IBUPROFEN PO Take  by mouth as needed.  magnesium 250 mg tab Take  by mouth. Past Medical History:   Diagnosis Date    Acute sinusitis     BBB (bundle branch block)     Bursitis disorder     Cardiac cath 07/30/2012    R dom. Patent coronary arteries. LVEDP 15.  RA 10.  RV 32/10. PA 32/18. Mean PASP 22. W 14.  CO 5.7 (TD). High CO & high O2 sats on right raise question of peripheral arterial venous shunting.  Cardiac echocardiogram 06/27/2012    EF 50-55%. Dyssynergic septal motion c/w LBBB. RVSP/PASP 50-55. Mild LAE. Mild-mod TR. Mild IVCE. 30 x 5-mm echodensity in interventricular groove (poss fibrinous deposit). Sm pericardial effus at apex.     Hx of cardiac cath Braeden's Daughters at about 12years old.  Obesity     Other ill-defined conditions(799.89)     heart cath 2012    Skin lesion     Umbilical hernia without obstruction or gangrene     Unspecified sleep apnea     no diagnosed       Past Surgical History:   Procedure Laterality Date    HX HEART CATHETERIZATION  07/30/12    HX HEENT      dental extract       Social History     Social History    Marital status: SINGLE     Spouse name: N/A    Number of children: N/A    Years of education: N/A     Occupational History    Not on file. Social History Main Topics    Smoking status: Never Smoker    Smokeless tobacco: Never Used    Alcohol use 0.0 oz/week     2 - 3 Standard drinks or equivalent per week    Drug use: No    Sexual activity: Yes     Other Topics Concern    Not on file     Social History Narrative       Patient does not have an advanced directive on file    Vitals:    11/13/17 1348   BP: 130/80   Pulse: 92   Resp: 16   Temp: 98.6 °F (37 °C)   TempSrc: Tympanic   SpO2: 98%   Weight: 226 lb (102.5 kg)   Height: 5' 5\" (1.651 m)   PainSc:   2   PainLoc: Back       Physical Exam   Constitutional: No distress. Cardiovascular: Normal rate, regular rhythm and normal heart sounds. Pulmonary/Chest: Effort normal and breath sounds normal.   Abdominal: Soft. Neurological: He is alert. Nursing note and vitals reviewed. No visits with results within 3 Month(s) from this visit. Latest known visit with results is:    Office Visit on 08/10/2017   Component Date Value Ref Range Status    Hemoglobin A1c (POC) 08/10/2017 6.8  % Final       .No results found for any visits on 11/13/17. Assessment / Plan:      ICD-10-CM ICD-9-CM    1. Essential hypertension I10 401.9    2. Blood glucose elevated R73.9 790.29 HEMOGLOBIN A1C WITH EAG      CANCELED: AMB POC HEMOGLOBIN A1C   3. Neuropathy G62.9 355.9 gabapentin (NEURONTIN) 100 mg capsule   4.  Obesity (BMI 35.0-39.9 without comorbidity) E66.9 278.00 HTN- controlled  Diabetes- ordered Hgb A1C  Gabapentin rx given- neuropathy  F/u in 4 months    Follow-up Disposition:  Return in about 4 months (around 3/13/2018), or if symptoms worsen or fail to improve. I asked the patient if he  had any questions and answered his  questions. The patient stated that he understands the treatment plan and agrees with the treatment plan      Discussed the patient's BMI with him. The BMI follow up plan is as follows:     dietary management education, guidance, and counseling  encourage exercise  monitor weight    An After Visit Summary was printed and given to the patient.

## 2017-11-14 RX ORDER — LABETALOL 100 MG/1
TABLET, FILM COATED ORAL
Qty: 60 TAB | Refills: 2 | Status: SHIPPED | OUTPATIENT
Start: 2017-11-14 | End: 2018-03-04 | Stop reason: SDUPTHER

## 2017-11-17 DIAGNOSIS — I10 ESSENTIAL HYPERTENSION: ICD-10-CM

## 2017-11-17 RX ORDER — VALSARTAN AND HYDROCHLOROTHIAZIDE 80; 12.5 MG/1; MG/1
TABLET, FILM COATED ORAL
Qty: 30 TAB | Refills: 1 | Status: SHIPPED | OUTPATIENT
Start: 2017-11-17 | End: 2018-01-20 | Stop reason: SDUPTHER

## 2017-12-11 ENCOUNTER — OFFICE VISIT (OUTPATIENT)
Dept: INTERNAL MEDICINE CLINIC | Age: 47
End: 2017-12-11

## 2017-12-11 VITALS
SYSTOLIC BLOOD PRESSURE: 150 MMHG | RESPIRATION RATE: 18 BRPM | BODY MASS INDEX: 36.99 KG/M2 | OXYGEN SATURATION: 99 % | WEIGHT: 222 LBS | HEIGHT: 65 IN | HEART RATE: 87 BPM | DIASTOLIC BLOOD PRESSURE: 88 MMHG | TEMPERATURE: 98.6 F

## 2017-12-11 DIAGNOSIS — M54.50 LUMBAR PAIN WITH RADIATION DOWN RIGHT LEG: Primary | ICD-10-CM

## 2017-12-11 DIAGNOSIS — E11.21 TYPE II DIABETES MELLITUS WITH NEPHROPATHY (HCC): ICD-10-CM

## 2017-12-11 DIAGNOSIS — M79.604 LUMBAR PAIN WITH RADIATION DOWN RIGHT LEG: Primary | ICD-10-CM

## 2017-12-11 DIAGNOSIS — G62.9 NEUROPATHY: ICD-10-CM

## 2017-12-11 DIAGNOSIS — R73.09 ELEVATED GLUCOSE LEVEL: ICD-10-CM

## 2017-12-11 DIAGNOSIS — I10 ESSENTIAL HYPERTENSION: ICD-10-CM

## 2017-12-11 LAB — HBA1C MFR BLD HPLC: 8 %

## 2017-12-11 RX ORDER — TRAMADOL HYDROCHLORIDE 50 MG/1
50 TABLET ORAL
Qty: 12 TAB | Refills: 0 | Status: SHIPPED | OUTPATIENT
Start: 2017-12-11 | End: 2018-05-15

## 2017-12-11 RX ORDER — PREDNISONE 20 MG/1
TABLET ORAL
Qty: 8 TAB | Refills: 0 | Status: SHIPPED | OUTPATIENT
Start: 2017-12-11 | End: 2018-01-03 | Stop reason: ALTCHOICE

## 2017-12-11 RX ORDER — METFORMIN HYDROCHLORIDE 500 MG/1
500 TABLET ORAL 2 TIMES DAILY WITH MEALS
Qty: 60 TAB | Refills: 1 | Status: SHIPPED | OUTPATIENT
Start: 2017-12-11 | End: 2018-03-08 | Stop reason: SDUPTHER

## 2017-12-11 RX ORDER — GABAPENTIN 100 MG/1
200 CAPSULE ORAL
Qty: 60 CAP | Refills: 1 | Status: SHIPPED | OUTPATIENT
Start: 2017-12-11 | End: 2018-02-02 | Stop reason: ALTCHOICE

## 2017-12-11 NOTE — MR AVS SNAPSHOT
Visit Information Date & Time Provider Department Dept. Phone Encounter #  
 12/11/2017  3:45 PM Juan Carlos Alcocer NP [facility] OF [address] [id number] Your Appointments 2/9/2018  2:30 PM  
Follow Up with Juan Carlos Alcocer NP  
[facility] OF [place] ([address]) Appt Note: 2 mo f/u  
 [address], [address]  
  
   
 [address], [address] [place] [address] 3/13/2018  9:15 AM  
Follow Up with Juan Carlos Alcocer NP  
[facility] OF [place] ([address]) Appt Note: 4 mo f/u  
 [address], [address] [place] [address]  
[phone] Upcoming Health Maintenance Date Due DTaP/Tdap/Td series (1 - Tdap) 11/9/1992 COLONOSCOPY 9/22/2020 Allergies as of 12/11/2017  Review Complete On: 12/11/2017 By: Jayant Donaldson LPN Severity Noted Reaction Type Reactions Iodine  06/21/2012    Unknown (comments) Current Immunizations  Reviewed on 11/8/2017 No immunizations on file. Not reviewed this visit You Were Diagnosed With   
  
 Codes Comments Lumbar pain with radiation down right leg    -  Primary ICD-10-CM: M54.5 ICD-9-CM: 724.2 Neuropathy     ICD-10-CM: G62.9 ICD-9-CM: 355.9 Essential hypertension     ICD-10-CM: I10 
ICD-9-CM: 401.9 Elevated glucose level     ICD-10-CM: R73.09 
ICD-9-CM: 790.29 Type II diabetes mellitus with nephropathy (HCC)     ICD-10-CM: E11.21 
ICD-9-CM: 250.40, 583.81 Vitals BP Pulse Temp Resp Height(growth percentile) Weight(growth percentile) 150/88 (BP 1 Location: Left arm, BP Patient Position: Sitting) 87 98.6 °F (37 °C) (Tympanic) 18 5' 5\" (1.651 m) 222 lb (100.7 kg) SpO2 BMI Smoking Status 99% 36.94 kg/m2 Never Smoker Vitals History BMI and BSA Data Body Mass Index Body Surface Area 36.94 kg/m 2 2.15 m 2 Preferred Pharmacy Pharmacy Name Phone [facility] [address] [place], [address] Your Updated Medication List  
  
   
This list is accurate as of: 17  4:49 PM.  Always use your most recent med list.  
  
  
  
  
 aspirin delayed-release 81 mg tablet Take  by mouth daily. B COMPLEX 1 tablet Generic drug:  b complex vitamins Take 1 Tab by mouth daily. gabapentin 100 mg capsule Commonly known as:  NEURONTIN Take 2 Caps by mouth nightly. IBUPROFEN PO Take  by mouth as needed. labetalol 100 mg tablet Commonly known as:  NORMODYNE  
TAKE ONE TABLET BY MOUTH TWICE DAILY  
  
 magnesium 250 mg Tab Take  by mouth.  
  
 metFORMIN 500 mg tablet Commonly known as:  GLUCOPHAGE Take 1 Tab by mouth two (2) times daily (with meals). predniSONE 20 mg tablet Commonly known as:  DELTASONE  
2 tabs daily x 2 days, 1 tab daily x 2 days, 1/2 tab daily x 4 days  
  
 traMADol 50 mg tablet Commonly known as:  ULTRAM  
Take 1 Tab by mouth every six (6) hours as needed for Pain. Max Daily Amount: 200 mg.  
  
 valsartan-hydroCHLOROthiazide 80-12.5 mg per tablet Commonly known as:  DIOVAN-HCT  
TAKE ONE TABLET BY MOUTH ONCE DAILY Prescriptions Printed Refills  
 traMADol (ULTRAM) 50 mg tablet 0 Sig: Take 1 Tab by mouth every six (6) hours as needed for Pain. Max Daily Amount: 200 mg. Class: Print Route: Oral  
  
Prescriptions Sent to Pharmacy Refills  
 predniSONE (DELTASONE) 20 mg tablet 0 Si tabs daily x 2 days, 1 tab daily x 2 days, 1/2 tab daily x 4 days Class: Normal  
 Pharmacy: [address] [facility] Ph #: [phone]  
 gabapentin (NEURONTIN) 100 mg capsule 1 Sig: Take 2 Caps by mouth nightly.   
 Class: Normal  
 Pharmacy: [address] [address] Ph #: [phone] Route: Oral  
 metFORMIN (GLUCOPHAGE) 500 mg tablet 1 Sig: Take 1 Tab by mouth two (2) times daily (with meals). Class: Normal  
 Pharmacy: [address] [facility] Ruiz [facility] Ph #: [phone] Route: Oral  
  
We Performed the Following AMB POC HEMOGLOBIN A1C [10101 CPT(R)] REFERRAL TO ORTHOPEDICS [JZX263 Custom] Comments:  
 Lumbar back pain with radiculopathy To-Do List   
 12/11/2017 Imaging:  MRI LUMB SPINE WO CONT Referral Information Referral ID Referred By Referred To  
  
 [id number] Deyanira HAAS Not Available Visits Status Start Date End Date 1 New Request 12/11/17 12/11/18 If your referral has a status of pending review or denied, additional information will be sent to support the outcome of this decision. Referral ID Referred By Referred To  
 [id number] [address], [address] [place] [address] [address] Phone: [phone] Fax: [phone] Visits Status Start Date End Date 1 New Request 12/11/17 12/11/18 If your referral has a status of pending review or denied, additional information will be sent to support the outcome of this decision. Patient Instructions Body Mass Index: Care Instructions Your Care Instructions Body mass index (BMI) can help you see if your weight is raising your risk for health problems. It uses a formula to compare how much you weigh with how tall you are. · A BMI lower than 18.5 is considered underweight. · A BMI between 18.5 and 24.9 is considered healthy. · A BMI between 25 and 29.9 is considered overweight. A BMI of 30 or higher is considered obese. If your BMI is in the normal range, it means that you have a lower risk for weight-related health problems.  If your BMI is in the overweight or obese range, you may be at increased risk for weight-related health problems, such as high blood pressure, heart disease, stroke, arthritis or joint pain, and diabetes. If your BMI is in the underweight range, you may be at increased risk for health problems such as fatigue, lower protection (immunity) against illness, muscle loss, bone loss, hair loss, and hormone problems. BMI is just one measure of your risk for weight-related health problems. You may be at higher risk for health problems if you are not active, you eat an unhealthy diet, or you drink too much alcohol or use tobacco products. Follow-up care is a key part of your treatment and safety. Be sure to make and go to all appointments, and call your doctor if you are having problems. It's also a good idea to know your test results and keep a list of the medicines you take. How can you care for yourself at home? · Practice healthy eating habits. This includes eating plenty of fruits, vegetables, whole grains, lean protein, and low-fat dairy. · If your doctor recommends it, get more exercise. Walking is a good choice. Bit by bit, increase the amount you walk every day. Try for at least 30 minutes on most days of the week. · Do not smoke. Smoking can increase your risk for health problems. If you need help quitting, talk to your doctor about stop-smoking programs and medicines. These can increase your chances of quitting for good. · Limit alcohol to 2 drinks a day for men and 1 drink a day for women. Too much alcohol can cause health problems. If you have a BMI higher than 25 · Your doctor may do other tests to check your risk for weight-related health problems. This may include measuring the distance around your waist. A waist measurement of more than 40 inches in men or 35 inches in women can increase the risk of weight-related health problems.  
· Talk with your doctor about steps you can take to stay healthy or improve your health. You may need to make lifestyle changes to lose weight and stay healthy, such as changing your diet and getting regular exercise. If you have a BMI lower than 18.5 · Your doctor may do other tests to check your risk for health problems. · Talk with your doctor about steps you can take to stay healthy or improve your health. You may need to make lifestyle changes to gain or maintain weight and stay healthy, such as getting more healthy foods in your diet and doing exercises to build muscle. Where can you learn more? Go to http://marquise-nehemias.info/. Enter S176 in the search box to learn more about \"Body Mass Index: Care Instructions. \" Current as of: October 13, 2016 Content Version: 11.4 © 3276-2811 [employer]. Care instructions adapted under license by [employer] (which disclaims liability or warranty for this information). If you have questions about a medical condition or this instruction, always ask your healthcare professional. [place] [address] any warranty or liability for your use of this information. Introducing [facility]! [facility] introduces Workle patient portal. Now you can access parts of your medical record, email your doctor's office, and request medication refills online. 1. In your internet browser, go to [email or web address]. "[employer]/Rooftop Down 2. Click on the First Time User? Click Here link in the Sign In box. You will see the New Member Sign Up page. 3. Enter your Workle Access Code exactly as it appears below. You will not need to use this code after youve completed the sign-up process. If you do not sign up before the expiration date, you must request a new code. · Workle Access Code: [email or web address] Expires: 2/5/2018  1:07 PM 
 
4. Enter the last four digits of your Social Security Number (xxxx) and Date of Birth (mm/dd/yyyy) as indicated and click Submit.  You will be taken to the next sign-up page. 5. Create a DeliveryEdge ID. This will be your DeliveryEdge login ID and cannot be changed, so think of one that is secure and easy to remember. 6. Create a DeliveryEdge password. You can change your password at any time. 7. Enter your Password Reset Question and Answer. This can be used at a later time if you forget your password. 8. Enter your e-mail address. You will receive e-mail notification when new information is available in [address]. 9. Click Sign Up. You can now view and download portions of your medical record. 10. Click the Download Summary menu link to download a portable copy of your medical information. If you have questions, please visit the Frequently Asked Questions section of the DeliveryEdge website. Remember, DeliveryEdge is NOT to be used for urgent needs. For medical emergencies, dial 911. Now available from your iPhone and Android! Please provide this summary of care documentation to your next provider. Your primary care clinician is listed as Aleyda Feliz. If you have any questions after today's visit, please call [phone].

## 2017-12-11 NOTE — PROGRESS NOTES
Cruz Billings is a 56 y.o. male presenting today for Back Pain  . HPI:  Cruz Billings presents to the office today for lumbar pain with RLE radiculopathy. Patient noted he has missed several days of work due to his lumbar pain. His pain increases with ambulation. Review of Systems   Respiratory: Negative for cough and sputum production. Cardiovascular: Negative for chest pain, palpitations and leg swelling. Genitourinary: Positive for frequency. Musculoskeletal: Positive for back pain and myalgias. Allergies   Allergen Reactions    Iodine Unknown (comments)       Current Outpatient Prescriptions   Medication Sig Dispense Refill    predniSONE (DELTASONE) 20 mg tablet 2 tabs daily x 2 days, 1 tab daily x 2 days, 1/2 tab daily x 4 days 8 Tab 0    gabapentin (NEURONTIN) 100 mg capsule Take 2 Caps by mouth nightly. 60 Cap 1    traMADol (ULTRAM) 50 mg tablet Take 1 Tab by mouth every six (6) hours as needed for Pain. Max Daily Amount: 200 mg. 12 Tab 0    metFORMIN (GLUCOPHAGE) 500 mg tablet Take 1 Tab by mouth two (2) times daily (with meals). 60 Tab 1    valsartan-hydroCHLOROthiazide (DIOVAN-HCT) 80-12.5 mg per tablet TAKE ONE TABLET BY MOUTH ONCE DAILY 30 Tab 1    labetalol (NORMODYNE) 100 mg tablet TAKE ONE TABLET BY MOUTH TWICE DAILY 60 Tab 2    aspirin delayed-release 81 mg tablet Take  by mouth daily.  magnesium 250 mg tab Take  by mouth.  b complex vitamins (B COMPLEX 1) tablet Take 1 Tab by mouth daily.  IBUPROFEN PO Take  by mouth as needed. Past Medical History:   Diagnosis Date    Acute sinusitis     BBB (bundle branch block)     Bursitis disorder     Cardiac cath 07/30/2012    R dom. Patent coronary arteries. LVEDP 15.  RA 10.  RV 32/10. PA 32/18. Mean PASP 22. W 14.  CO 5.7 (TD). High CO & high O2 sats on right raise question of peripheral arterial venous shunting.  Cardiac echocardiogram 06/27/2012    EF 50-55%.   Dyssynergic septal motion c/w LBBB. RVSP/PASP 50-55. Mild LAE. Mild-mod TR. Mild IVCE. 30 x 5-mm echodensity in interventricular groove (poss fibrinous deposit). Sm pericardial effus at apex.  Hx of cardiac cath     Braeden's Daughters at about 12years old.  Obesity     Other ill-defined conditions(799.89)     heart cath 2012    Skin lesion     Umbilical hernia without obstruction or gangrene     Unspecified sleep apnea     no diagnosed       Past Surgical History:   Procedure Laterality Date    HX HEART CATHETERIZATION  07/30/12    HX HEENT      dental extract       Social History     Social History    Marital status: SINGLE     Spouse name: N/A    Number of children: N/A    Years of education: N/A     Occupational History    Not on file. Social History Main Topics    Smoking status: Never Smoker    Smokeless tobacco: Never Used    Alcohol use 0.0 oz/week     2 - 3 Standard drinks or equivalent per week    Drug use: No    Sexual activity: Yes     Other Topics Concern    Not on file     Social History Narrative       Patient does not have an advanced directive on file    Vitals:    12/11/17 1607   BP: 150/88   Pulse: 87   Resp: 18   Temp: 98.6 °F (37 °C)   TempSrc: Tympanic   SpO2: 99%   Weight: 222 lb (100.7 kg)   Height: 5' 5\" (1.651 m)   PainSc:   1   PainLoc: Back       Physical Exam   Constitutional: No distress. Cardiovascular: Normal rate, regular rhythm and normal heart sounds. Pulmonary/Chest: Effort normal.   Musculoskeletal: He exhibits no edema or tenderness. Lumbar back: He exhibits pain. He exhibits normal range of motion, no bony tenderness and no swelling. Neurological: He is alert. Vitals reviewed.       Office Visit on 12/11/2017   Component Date Value Ref Range Status    Hemoglobin A1c (POC) 12/11/2017 8.0  % Final       .  Results for orders placed or performed in visit on 12/11/17   AMB POC HEMOGLOBIN A1C   Result Value Ref Range    Hemoglobin A1c (POC) 8.0 % Assessment / Plan:      ICD-10-CM ICD-9-CM    1. Lumbar pain with radiation down right leg M54.5 724.2 MRI LUMB SPINE WO CONT      predniSONE (DELTASONE) 20 mg tablet      REFERRAL TO ORTHOPEDICS      traMADol (ULTRAM) 50 mg tablet   2. Neuropathy G62.9 355.9 gabapentin (NEURONTIN) 100 mg capsule   3. Essential hypertension I10 401.9    4. Elevated glucose level R73.09 790.29 AMB POC HEMOGLOBIN A1C   5. Type II diabetes mellitus with nephropathy (HCC) E11.21 250.40 metFORMIN (GLUCOPHAGE) 500 mg tablet     583.81      Hgb A1C 6.8 in July. Referral to dietician and lifestyle modifications discussed  Hgb A1C today is 8  Frequency of urination- will start Metformin 500 mg  Referral to Ortho  Prednisone rx  MR Lumbar spine  Refilled Gabapentin  F/u  In 3 month    Follow-up Disposition:  Return in about 3 months (around 3/11/2018). I asked the patient if he  had any questions and answered his  questions. The patient stated that he understands the treatment plan and agrees with the treatment plan      Discussed the patient's BMI with him. The BMI follow up plan is as follows:     dietary management education, guidance, and counseling  encourage exercise  monitor weight      An After Visit Summary was printed and given to the patient.

## 2017-12-11 NOTE — PATIENT INSTRUCTIONS
Body Mass Index: Care Instructions  Your Care Instructions    Body mass index (BMI) can help you see if your weight is raising your risk for health problems. It uses a formula to compare how much you weigh with how tall you are. · A BMI lower than 18.5 is considered underweight. · A BMI between 18.5 and 24.9 is considered healthy. · A BMI between 25 and 29.9 is considered overweight. A BMI of 30 or higher is considered obese. If your BMI is in the normal range, it means that you have a lower risk for weight-related health problems. If your BMI is in the overweight or obese range, you may be at increased risk for weight-related health problems, such as high blood pressure, heart disease, stroke, arthritis or joint pain, and diabetes. If your BMI is in the underweight range, you may be at increased risk for health problems such as fatigue, lower protection (immunity) against illness, muscle loss, bone loss, hair loss, and hormone problems. BMI is just one measure of your risk for weight-related health problems. You may be at higher risk for health problems if you are not active, you eat an unhealthy diet, or you drink too much alcohol or use tobacco products. Follow-up care is a key part of your treatment and safety. Be sure to make and go to all appointments, and call your doctor if you are having problems. It's also a good idea to know your test results and keep a list of the medicines you take. How can you care for yourself at home? · Practice healthy eating habits. This includes eating plenty of fruits, vegetables, whole grains, lean protein, and low-fat dairy. · If your doctor recommends it, get more exercise. Walking is a good choice. Bit by bit, increase the amount you walk every day. Try for at least 30 minutes on most days of the week. · Do not smoke. Smoking can increase your risk for health problems. If you need help quitting, talk to your doctor about stop-smoking programs and medicines. These can increase your chances of quitting for good. · Limit alcohol to 2 drinks a day for men and 1 drink a day for women. Too much alcohol can cause health problems. If you have a BMI higher than 25  · Your doctor may do other tests to check your risk for weight-related health problems. This may include measuring the distance around your waist. A waist measurement of more than 40 inches in men or 35 inches in women can increase the risk of weight-related health problems. · Talk with your doctor about steps you can take to stay healthy or improve your health. You may need to make lifestyle changes to lose weight and stay healthy, such as changing your diet and getting regular exercise. If you have a BMI lower than 18.5  · Your doctor may do other tests to check your risk for health problems. · Talk with your doctor about steps you can take to stay healthy or improve your health. You may need to make lifestyle changes to gain or maintain weight and stay healthy, such as getting more healthy foods in your diet and doing exercises to build muscle. Where can you learn more? Go to http://marquise-nehemias.info/. Enter S176 in the search box to learn more about \"Body Mass Index: Care Instructions. \"  Current as of: October 13, 2016  Content Version: 11.4  © 3343-7939 [employer]. Care instructions adapted under license by [employer] (which disclaims liability or warranty for this information). If you have questions about a medical condition or this instruction, always ask your healthcare professional. [address] any warranty or liability for your use of this information.

## 2017-12-14 ENCOUNTER — HOSPITAL ENCOUNTER (OUTPATIENT)
Dept: MRI IMAGING | Age: 47
Discharge: HOME | End: 2017-12-14
Attending: NURSE PRACTITIONER
Payer: COMMERCIAL

## 2017-12-14 DIAGNOSIS — M79.604 LUMBAR PAIN WITH RADIATION DOWN RIGHT LEG: ICD-10-CM

## 2017-12-14 DIAGNOSIS — M54.50 LUMBAR PAIN WITH RADIATION DOWN RIGHT LEG: ICD-10-CM

## 2017-12-14 PROCEDURE — 72148 MRI LUMBAR SPINE W/O DYE: CPT

## 2017-12-18 ENCOUNTER — TELEPHONE (OUTPATIENT)
Dept: INTERNAL MEDICINE CLINIC | Age: 47
End: 2017-12-18

## 2017-12-18 NOTE — TELEPHONE ENCOUNTER
Patient states he is having a lot of pain, shooting down leg and today having popping sounds in his back. He has 1 more dose of prednsione. Please advise.

## 2017-12-29 ENCOUNTER — TELEPHONE (OUTPATIENT)
Dept: INTERNAL MEDICINE CLINIC | Age: 47
End: 2017-12-29

## 2017-12-29 NOTE — TELEPHONE ENCOUNTER
Patient called to check the status of the FMLA papers he dropped off last week to be completed. Please call him when they are ready to be picked up. He would like to pick these up by next Wednesday, 1/3/18.

## 2018-01-03 ENCOUNTER — OFFICE VISIT (OUTPATIENT)
Dept: ORTHOPEDIC SURGERY | Age: 48
End: 2018-01-03

## 2018-01-03 VITALS
WEIGHT: 221.6 LBS | HEIGHT: 65 IN | RESPIRATION RATE: 16 BRPM | DIASTOLIC BLOOD PRESSURE: 87 MMHG | SYSTOLIC BLOOD PRESSURE: 137 MMHG | BODY MASS INDEX: 36.92 KG/M2 | HEART RATE: 85 BPM

## 2018-01-03 DIAGNOSIS — M48.062 SPINAL STENOSIS, LUMBAR REGION WITH NEUROGENIC CLAUDICATION: Primary | ICD-10-CM

## 2018-01-03 DIAGNOSIS — M54.16 LUMBAR NEURITIS: ICD-10-CM

## 2018-01-03 DIAGNOSIS — M51.36 DDD (DEGENERATIVE DISC DISEASE), LUMBAR: ICD-10-CM

## 2018-01-03 RX ORDER — GABAPENTIN 300 MG/1
300 CAPSULE ORAL 3 TIMES DAILY
Qty: 90 CAP | Refills: 1 | Status: SHIPPED | OUTPATIENT
Start: 2018-01-03 | End: 2018-02-02 | Stop reason: ALTCHOICE

## 2018-01-03 NOTE — MR AVS SNAPSHOT
Visit Information Date & Time Provider Department Dept. Phone Encounter #  
 1/3/2018  1:55 PM William Dutton  [address], [address] and [facility] [place] [phone] Follow-up Instructions Return in about 4 weeks (around 1/31/2018). Your Appointments 1/3/2018  1:55 PM  
New Patient with William Dutton MD  
[facility] [place] ([address]) Appt Note: lumbar back pain, ymri - pt to bring cd, ref'd/sched by Chase Fontanez, arrive 10am; pt r/s 01/02/18 appt 2012 [facility]. [facility] [address]  
  
   
 2012 270 [facility]  
  
    
 2/9/2018  2:30 PM  
Follow Up with Mejia Cowan NP  
[facility] [place] ([address]) Appt Note: 2 mo f/u  
 [address], [address] [facility] [address]  
  
   
 [address], [address] [facility] [address] 3/13/2018  9:15 AM  
Follow Up with Mejia Cowan NP  
[facility] [place] ([address]) Appt Note: 4 mo f/u  
 [address], [address] [facility] [address]  
[phone] Upcoming Health Maintenance Date Due DTaP/Tdap/Td series (1 - Tdap) 11/9/1992 COLONOSCOPY 9/22/2020 Allergies as of 1/3/2018  Review Complete On: 1/3/2018 By: William Dutton MD  
  
 Severity Noted Reaction Type Reactions Iodine  06/21/2012    Unknown (comments) Current Immunizations  Reviewed on 11/8/2017 No immunizations on file. Not reviewed this visit You Were Diagnosed With   
  
 Codes Comments Spinal stenosis, lumbar region with neurogenic claudication    -  Primary ICD-10-CM: R01.370 
ICD-9-CM: 724.03 Lumbar neuritis     ICD-10-CM: M54.16 
ICD-9-CM: 724.4 DDD (degenerative disc disease), lumbar     ICD-10-CM: M51.36 
ICD-9-CM: 722.52 Vitals BP Pulse Resp Height(growth percentile) Weight(growth percentile) BMI  
 137/87 85 16 5' 5\" (1.651 m) 221 lb 9.6 oz (100.5 kg) 36.88 kg/m2 Smoking Status Never Smoker Vitals History BMI and BSA Data Body Mass Index Body Surface Area  
 36.88 kg/m 2 2.15 m 2 Preferred Pharmacy Pharmacy Name Phone [address] [facility]  [phone] Your Updated Medication List  
  
   
This list is accurate as of: 1/3/18  1:51 PM.  Always use your most recent med list.  
  
  
  
  
 aspirin delayed-release 81 mg tablet Take  by mouth daily. B COMPLEX 1 tablet Generic drug:  b complex vitamins Take 1 Tab by mouth daily. * gabapentin 100 mg capsule Commonly known as:  NEURONTIN Take 2 Caps by mouth nightly. * gabapentin 300 mg capsule Commonly known as:  NEURONTIN Take 1 Cap by mouth three (3) times daily. IBUPROFEN PO Take  by mouth as needed. labetalol 100 mg tablet Commonly known as:  NORMODYNE  
TAKE ONE TABLET BY MOUTH TWICE DAILY  
  
 magnesium 250 mg Tab Take  by mouth.  
  
 metFORMIN 500 mg tablet Commonly known as:  GLUCOPHAGE Take 1 Tab by mouth two (2) times daily (with meals). traMADol 50 mg tablet Commonly known as:  ULTRAM  
Take 1 Tab by mouth every six (6) hours as needed for Pain. Max Daily Amount: 200 mg.  
  
 valsartan-hydroCHLOROthiazide 80-12.5 mg per tablet Commonly known as:  DIOVAN-HCT  
TAKE ONE TABLET BY MOUTH ONCE DAILY * Notice: This list has 2 medication(s) that are the same as other medications prescribed for you. Read the directions carefully, and ask your doctor or other care provider to review them with you. Prescriptions Sent to Pharmacy Refills  
 gabapentin (NEURONTIN) 300 mg capsule 1 Sig: Take 1 Cap by mouth three (3) times daily.   
 Class: Normal  
 Pharmacy: [facility] [address] MYLES SCHULTZ [address] [facility], [address] [facility] #: [phone] Route: Oral  
  
Follow-up Instructions Return in about 4 weeks (around 1/31/2018). Introducing hospitals & HEALTH SERVICES! [employer] introduces [employer] Private patient portal. Now you can access parts of your medical record, email your doctor's office, and request medication refills online. 1. In your internet browser, go to [email or web address] 2. Click on the First Time User? Click Here link in the Sign In box. You will see the New Member Sign Up page. 3. Enter your [employer] Private Access Code exactly as it appears below. You will not need to use this code after youve completed the sign-up process. If you do not sign up before the expiration date, you must request a new code. · [employer] Private Access Code: [email or web address] Expires: 2/5/2018  1:07 PM 
 
4. Enter the last four digits of your Social Security Number (xxxx) and Date of Birth (mm/dd/yyyy) as indicated and click Submit. You will be taken to the next sign-up page. 5. Create a [employer] Private ID. This will be your [employer] Private login ID and cannot be changed, so think of one that is secure and easy to remember. 6. Create a [employer] Private password. You can change your password at any time. 7. Enter your Password Reset Question and Answer. This can be used at a later time if you forget your password. 8. Enter your e-mail address. You will receive e-mail notification when new information is available in [address] 9. Click Sign Up. You can now view and download portions of your medical record. 10. Click the Download Summary menu link to download a portable copy of your medical information. If you have questions, please visit the Frequently Asked Questions section of the [employer] Private website. Remember, [employer] Private is NOT to be used for urgent needs. For medical emergencies, dial 911. Now available from your iPhone and Android! Please provide this summary of care documentation to your next provider. Your primary care clinician is listed as Afsaneh Pearce. If you have any questions after today's visit, please call [phone].

## 2018-01-20 DIAGNOSIS — I10 ESSENTIAL HYPERTENSION: ICD-10-CM

## 2018-01-21 RX ORDER — VALSARTAN AND HYDROCHLOROTHIAZIDE 80; 12.5 MG/1; MG/1
TABLET, FILM COATED ORAL
Qty: 30 TAB | Refills: 1 | Status: SHIPPED | OUTPATIENT
Start: 2018-01-21 | End: 2018-03-22 | Stop reason: SDUPTHER

## 2018-01-24 ENCOUNTER — TELEPHONE (OUTPATIENT)
Dept: ORTHOPEDIC SURGERY | Age: 48
End: 2018-01-24

## 2018-01-24 NOTE — TELEPHONE ENCOUNTER
[employer] Disability forms came to [place] office on 1/24/18. Please complete and give to [address]. To scan and  to call patient and fax to company and make copy. Forms will be in NP door.

## 2018-01-29 ENCOUNTER — DOCUMENTATION ONLY (OUTPATIENT)
Dept: ORTHOPEDIC SURGERY | Age: 48
End: 2018-01-29

## 2018-01-31 ENCOUNTER — OFFICE VISIT (OUTPATIENT)
Dept: ORTHOPEDIC SURGERY | Age: 48
End: 2018-01-31

## 2018-01-31 VITALS
RESPIRATION RATE: 18 BRPM | SYSTOLIC BLOOD PRESSURE: 145 MMHG | HEIGHT: 65 IN | DIASTOLIC BLOOD PRESSURE: 88 MMHG | HEART RATE: 85 BPM | WEIGHT: 224.2 LBS | BODY MASS INDEX: 37.36 KG/M2

## 2018-01-31 DIAGNOSIS — M48.062 SPINAL STENOSIS, LUMBAR REGION WITH NEUROGENIC CLAUDICATION: Primary | ICD-10-CM

## 2018-01-31 DIAGNOSIS — M54.16 LUMBAR RADICULOPATHY: ICD-10-CM

## 2018-01-31 DIAGNOSIS — M51.36 DDD (DEGENERATIVE DISC DISEASE), LUMBAR: ICD-10-CM

## 2018-01-31 RX ORDER — GABAPENTIN 600 MG/1
600 TABLET ORAL 3 TIMES DAILY
Qty: 90 TAB | Refills: 1 | Status: SHIPPED | OUTPATIENT
Start: 2018-01-31 | End: 2018-04-23 | Stop reason: SDUPTHER

## 2018-01-31 NOTE — MR AVS SNAPSHOT
April Reilly 
 
 
 [address] [facility] 
[phone] Patient: Sally Mccracken MRN: [id number] :1970 Visit Information Date & Time Provider Department Dept. Phone Encounter #  
 2018 10:15 AM Ender Whitney  [address], [address] and [facility] [place] [phone] [id number] Follow-up Instructions Return if symptoms worsen or fail to improve. Your Appointments 2018  1:00 PM  
SURGERY CONSULT with Kevin Hoskins MD  
[address], [address] and [facility]) Appt Note: SC-Lumbar/Patient is aware of date/time/loc and to bring MRI. [address] [facility] [address]  
[phone]  
  
   
 [address] [facility],[address] [facility]  
  
    
 2018  2:30 PM  
Follow Up with Darryl Hodges NP  
[facility] [place] ([facility]) Appt Note: 2 mo f/u  
 [address] [facility]  
  
   
 [address], [address] [place] [address] 3/13/2018  9:15 AM  
Follow Up with Darryl Hodges NP  
[facility] [place] ([facility]) Appt Note: 4 mo f/u  
 [address] [place] [address]  
[phone] Upcoming Health Maintenance Date Due DTaP/Tdap/Td series (1 - Tdap) 1992 COLONOSCOPY 2020 Allergies as of 2018  Review Complete On: 2018 By: Ender Whitney MD  
  
 Severity Noted Reaction Type Reactions Iodine  2012    Unknown (comments) Current Immunizations  Reviewed on 2017 No immunizations on file. Not reviewed this visit You Were Diagnosed With   
  
 Codes Comments  Spinal stenosis, lumbar region with neurogenic claudication    -  Primary ICD-10-CM: M48.062 
ICD-9-CM: 724.03   
 DDD (degenerative disc disease), lumbar     ICD-10-CM: M51.36 
 ICD-9-CM: 722.52 Lumbar radiculopathy     ICD-10-CM: M54.16 
ICD-9-CM: 724.4 Vitals BP Pulse Resp Height(growth percentile) Weight(growth percentile) BMI  
 145/88 85 18 5' 5\" (1.651 m) 224 lb 3.2 oz (101.7 kg) 37.31 kg/m2 Smoking Status Never Smoker Vitals History BMI and BSA Data Body Mass Index Body Surface Area  
 37.31 kg/m 2 2.16 m 2 Preferred Pharmacy Pharmacy Name Phone [address] [place] [address] [phone] Your Updated Medication List  
  
   
This list is accurate as of: 1/31/18 11:46 AM.  Always use your most recent med list.  
  
  
  
  
 aspirin delayed-release 81 mg tablet Take  by mouth daily. B COMPLEX 1 tablet Generic drug:  b complex vitamins Take 1 Tab by mouth daily. * gabapentin 100 mg capsule Commonly known as:  NEURONTIN Take 2 Caps by mouth nightly. * gabapentin 300 mg capsule Commonly known as:  NEURONTIN Take 1 Cap by mouth three (3) times daily. * gabapentin 600 mg tablet Commonly known as:  NEURONTIN Take 1 Tab by mouth three (3) times daily. IBUPROFEN PO Take  by mouth as needed. labetalol 100 mg tablet Commonly known as:  NORMODYNE  
TAKE ONE TABLET BY MOUTH TWICE DAILY  
  
 magnesium 250 mg Tab Take  by mouth.  
  
 metFORMIN 500 mg tablet Commonly known as:  GLUCOPHAGE Take 1 Tab by mouth two (2) times daily (with meals). traMADol 50 mg tablet Commonly known as:  ULTRAM  
Take 1 Tab by mouth every six (6) hours as needed for Pain. Max Daily Amount: 200 mg.  
  
 valsartan-hydroCHLOROthiazide 80-12.5 mg per tablet Commonly known as:  DIOVAN-HCT  
TAKE ONE TABLET BY MOUTH ONCE DAILY * Notice: This list has 3 medication(s) that are the same as other medications prescribed for you. Read the directions carefully, and ask your doctor or other care provider to review them with you. Prescriptions Sent to Pharmacy Refills  
 gabapentin (NEURONTIN) 600 mg tablet 1 Sig: Take 1 Tab by mouth three (3) times daily. Class: Normal  
 Pharmacy: [facility] DR MYLES SCHULTZ [address] [place] [address] #: [phone] Route: Oral  
  
We Performed the Following REFERRAL TO SPINE SURGERY [ZGT771 Custom] Follow-up Instructions Return if symptoms worsen or fail to improve. Referral Information Referral ID Referred By Referred To [id number] Jennifer Saucedo MD   
   [address] Phone: [phone] Fax: [phone] Visits Status Start Date End Date 1 New Request 1/31/18 1/31/19 If your referral has a status of pending review or denied, additional information will be sent to support the outcome of this decision. Introducing [facility] & HEALTH SERVICES! [employer] introduces [employer] patient portal. Now you can access parts of your medical record, email your doctor's office, and request medication refills online. 1. In your internet browser, go to [email or web address] 2. Click on the First Time User? Click Here link in the Sign In box. You will see the New Member Sign Up page. 3. Enter your [employer] Access Code exactly as it appears below. You will not need to use this code after youve completed the sign-up process. If you do not sign up before the expiration date, you must request a new code. · [employer] Access Code: [email or web address] Expires: 2/5/2018  1:07 PM 
 
4. Enter the last four digits of your Social Security Number (xxxx) and Date of Birth (mm/dd/yyyy) as indicated and click Submit. You will be taken to the next sign-up page. 5. Create a [employer] ID. This will be your [employer] login ID and cannot be changed, so think of one that is secure and easy to remember. 6. Create a Science password. You can change your password at any time. 7. Enter your Password Reset Question and Answer. This can be used at a later time if you forget your password. 8. Enter your e-mail address. You will receive e-mail notification when new information is available in [address] 9. Click Sign Up. You can now view and download portions of your medical record. 10. Click the Download Summary menu link to download a portable copy of your medical information. If you have questions, please visit the Frequently Asked Questions section of the [employer] website. Remember, [employer] is NOT to be used for urgent needs. For medical emergencies, dial 911. Now available from your iPhone and Android! Please provide this summary of care documentation to your next provider. Your primary care clinician is listed as Jewel Del Rosario. If you have any questions after today's visit, please call [phone].

## 2018-01-31 NOTE — PROGRESS NOTES
[facility]  [address], [address]  Phone: [phone]  Fax: [phone]        PROGRESS NOTE      HISTORY OF PRESENT ILLNESS:  The patient is a 56 y.o. male and was seen today for follow up of low back pain extending into the BLE (R=L), progressive since 11/2017. Pt states he experienced intermittent low back pain for several months which was tolerable until 11/2017. He states his mid-back \"locks-up. \" No specific injury/trauma. Pt has a diagnosis of neuropathy and endorses paraesthesias of the bilateral feet. Note from Bubba Bowman NP dated 12/11/17 indicating patient was seen with c/o back pain radiating into the RLE, worse with ambulation. Of note, he missed several days of work due to his pain. At that time, he was treated with Prednisone, Tramadol and Neurontin 100 mg 2 tabs qhs. Pt reports Prednisone provided no relief. Pt was recently diagnosed with DM but does not have to monitor his blood sugars regularly. Pt denies change in bowel or bladder habits. Pt denies fever, weight loss, or skin changes. Lumbar spine MRI dated 12/14/17 reviewed. Per report, extensive epidural lipomatosis which causes moderate canal narrowing at L4-L5 and moderate to severe canal narrowing at L5-S1. At his last clinic appointment, the patient presented for progressive low back pain with bilateral radicular type symptoms. He was neurologically intact. We discussed multiple treatment options. Patient proceeded conservatively. I gave him a Rx for Neurontin 300 mg TID. The patient returns today with pain location and distribution remain unchanged. He rates pain 1-9/10, consistent with her last visit (3-10/10). Patient tolerates Neurontin 300 mg TID well without benefit. Patient requests to add restrictions on his FMLA.  reviewed. Body mass index is 37.31 kg/(m^2).         Past Medical History:   Diagnosis Date    Acute sinusitis     BBB (bundle branch block)     Bursitis disorder     Cardiac cath 07/30/2012    R dom. Patent coronary arteries. LVEDP 15.  RA 10.  RV 32/10. PA 32/18. Mean PASP 22. W 14.  CO 5.7 (TD). High CO & high O2 sats on right raise question of peripheral arterial venous shunting.  Cardiac echocardiogram 06/27/2012    EF 50-55%. Dyssynergic septal motion c/w LBBB. RVSP/PASP 50-55. Mild LAE. Mild-mod TR. Mild IVCE. 30 x 5-mm echodensity in interventricular groove (poss fibrinous deposit). Sm pericardial effus at apex.  Hx of cardiac cath     Braeden's Daughters at about 12years old.  Obesity     Other ill-defined conditions(799.89)     heart cath 2012    Skin lesion     Umbilical hernia without obstruction or gangrene     Unspecified sleep apnea     no diagnosed        Social History     Social History    Marital status:      Spouse name: N/A    Number of children: N/A    Years of education: N/A     Occupational History    Not on file. Social History Main Topics    Smoking status: Never Smoker    Smokeless tobacco: Never Used    Alcohol use 0.0 oz/week     2 - 3 Standard drinks or equivalent per week    Drug use: No    Sexual activity: Yes     Other Topics Concern    Not on file     Social History Narrative       Current Outpatient Prescriptions   Medication Sig Dispense Refill    gabapentin (NEURONTIN) 600 mg tablet Take 1 Tab by mouth three (3) times daily. 90 Tab 1    valsartan-hydroCHLOROthiazide (DIOVAN-HCT) 80-12.5 mg per tablet TAKE ONE TABLET BY MOUTH ONCE DAILY 30 Tab 1    gabapentin (NEURONTIN) 300 mg capsule Take 1 Cap by mouth three (3) times daily. 90 Cap 1    metFORMIN (GLUCOPHAGE) 500 mg tablet Take 1 Tab by mouth two (2) times daily (with meals). 60 Tab 1    labetalol (NORMODYNE) 100 mg tablet TAKE ONE TABLET BY MOUTH TWICE DAILY 60 Tab 2    IBUPROFEN PO Take  by mouth as needed.  gabapentin (NEURONTIN) 100 mg capsule Take 2 Caps by mouth nightly.  60 Cap 1    traMADol (ULTRAM) 50 mg tablet Take 1 Tab by mouth every six (6) hours as needed for Pain. Max Daily Amount: 200 mg. 12 Tab 0    aspirin delayed-release 81 mg tablet Take  by mouth daily.  magnesium 250 mg tab Take  by mouth.  b complex vitamins (B COMPLEX 1) tablet Take 1 Tab by mouth daily. Allergies   Allergen Reactions    Iodine Unknown (comments)          PHYSICAL EXAMINATION    Visit Vitals    /88    Pulse 85    Resp 18    Ht 5' 5\" (1.651 m)    Wt 224 lb 3.2 oz (101.7 kg)    BMI 37.31 kg/m2       CONSTITUTIONAL: NAD, A&O x 3  SENSATION: Intact to light touch throughout  RANGE OF MOTION: The patient has full passive range of motion in all four extremities. MOTOR:  Straight Leg Raise: Negative, bilateral               Hip Flex Knee Ext Knee Flex Ankle DF GTE Ankle PF Tone   Right +4/5 +4/5 +4/5 +4/5 +4/5 +4/5 +4/5   Left +4/5 +4/5 +4/5 +4/5 +4/5 +4/5 +4/5       ASSESSMENT   Diagnoses and all orders for this visit:    1. Spinal stenosis, lumbar region with neurogenic claudication  -     REFERRAL TO SPINE SURGERY    2. DDD (degenerative disc disease), lumbar  -     REFERRAL TO SPINE SURGERY    3. Lumbar radiculopathy  -     REFERRAL TO SPINE SURGERY    Other orders  -     gabapentin (NEURONTIN) 600 mg tablet; Take 1 Tab by mouth three (3) times daily. IMPRESSION AND PLAN:  At this point, patient declines ffurhter conservative management. Patient would like to proceed with surgical intervention. I gave him a note to be out of work until he f/u with Dr. Aminata Tucker. I will refer him to Dr. Aminata Tucker. I advised patient to bring copies of films to next visit. I will increase his Neurontin to 600 mg TID. Patient advised to call the office if intolerant to higher dose. I will see the patient back prn. Written by Lucho Herring, as dictated by Milli Justin MD  I examined the patient, reviewed and agree with the note.

## 2018-01-31 NOTE — LETTER
NOTIFICATION OF RETURN TO WORK / SCHOOL 
 
1/31/2018 11:42 AM 
 
Mr. Chris Rodríguez [address] [address] Lindy Cedillo To Whom It May Concern: Chris Rodríguez was under the care of [address] today 1-31-18. Mr. Manisha Tavares is to remain out of work until his follow up appointment on  2-2-18. If you have any questions please call the office at [phone]. Sincerely, Vinicius Fink MD

## 2018-02-02 ENCOUNTER — OFFICE VISIT (OUTPATIENT)
Dept: ORTHOPEDIC SURGERY | Age: 48
End: 2018-02-02

## 2018-02-02 VITALS
SYSTOLIC BLOOD PRESSURE: 147 MMHG | BODY MASS INDEX: 37.15 KG/M2 | RESPIRATION RATE: 18 BRPM | HEIGHT: 65 IN | OXYGEN SATURATION: 98 % | DIASTOLIC BLOOD PRESSURE: 75 MMHG | TEMPERATURE: 98.2 F | WEIGHT: 223 LBS | HEART RATE: 85 BPM

## 2018-02-02 DIAGNOSIS — M48.062 SPINAL STENOSIS, LUMBAR REGION WITH NEUROGENIC CLAUDICATION: ICD-10-CM

## 2018-02-02 DIAGNOSIS — D17.79 EPIDURAL LIPOMATOSIS: Primary | ICD-10-CM

## 2018-02-02 DIAGNOSIS — M54.16 LUMBAR RADICULOPATHY: ICD-10-CM

## 2018-02-02 DIAGNOSIS — R32 BOWEL AND BLADDER INCONTINENCE: ICD-10-CM

## 2018-02-02 DIAGNOSIS — R15.9 BOWEL AND BLADDER INCONTINENCE: ICD-10-CM

## 2018-02-02 NOTE — PROGRESS NOTES
[address]  [address]  [address]  Phone: [phone]  Fax: [phone]  INITIAL CONSULTATION  Patient: Raman Dailey                MRN: [id number]       SSN: [id number]  YOB: 1970        AGE: 56 y.o. SEX: male  Body mass index is 37.11 kg/(m^2). PCP: Lj Olivia MD  02/02/18    Chief Complaint   Patient presents with    Back Pain     lower back pain    Referral / Consult         HISTORY OF PRESENT ILLNESS, RADIOGRAPHS, and PLAN:         HISTORY OF PRESENT ILLNESS:  Mr. Abraham Wilson is seen today at the request of Dr. Clarence Wong and Dr. Chip Martini. Mr. Abraham Wilson is a 60-year-old male who for the past six weeks has had progressive low back pain with bilateral lower extremity pain, numbness, and cramping. He has also noticed a progression in difficulty in controlling his urine over this time with a sense of incontinence. He was evaluated by Dr. King Guzman, and an MRI was obtained, which demonstrates epidural lipomatosis with compression of the neural elements, moderate to severe, at L4-5 and L5-S1. He has a history of diabetes with what has been felt to be a peripheral neuropathy. He also drinks three to four alcoholic drinks a night. He has a distant history of cardiac disease. He is a nonsmoker. He is hypertensive. PHYSICAL EXAMINATION:  His physical exam demonstrates good strength of his EHL, tib/ant, hamstrings, and quadriceps. He has no peroneal numbness. There is some foot numbness. There are no nerve tension signs. He has mechanical low back pain and a supple neck. He has normal upper extremities. There is no clonus, Pierre's, or Babinski. He has 2+ pulses and soft abdomen.      RADIOGRAPHS:  Again, radiographs demonstrate what appears to be some measure of moderate to severe epidural lipomatosis with constriction of the neural elements at L4-5 and L5-S1.      ASSESSMENT/PLAN: The patients degree of epidural lipomatosis does not look severe enough to classically cause a cauda equina-type syndrome, but his story is somewhat concerning. He is a diabetic. He has a history of a peripheral neuropathy by history, though I am not clear that he has had an EMG. I think his presentation, the degree of compression with his story warrants a decompressive procedure. I think that is the safest way to go, a laminectomy at L4-5 and L5-S1 to decompress this stenotic region. Prognostically, we may need to obtain EMGs and evaluate and treat his peripheral neuropathy. He already had been on Gabapentin, and that has been ramped up, but that does not explain the progression of this bladder dysfunction. He is only in his mid-40s. I discussed with him the nature of the findings and my concerns and our desire for urgent intervention. He has had these symptoms now for a couple of months progressing. We will proceed with surgery once the appropriate approvals and clearances take place. cc: Álvaro Camp M.D. Past Medical History:   Diagnosis Date    Acute sinusitis     BBB (bundle branch block)     Bursitis disorder     Cardiac cath 07/30/2012    R dom. Patent coronary arteries. LVEDP 15.  RA 10.  RV 32/10. PA 32/18. Mean PASP 22. W 14.  CO 5.7 (TD). High CO & high O2 sats on right raise question of peripheral arterial venous shunting.  Cardiac echocardiogram 06/27/2012    EF 50-55%. Dyssynergic septal motion c/w LBBB. RVSP/PASP 50-55. Mild LAE. Mild-mod TR. Mild IVCE. 30 x 5-mm echodensity in interventricular groove (poss fibrinous deposit). Sm pericardial effus at apex.  Hx of cardiac cath     Braeden's Daughters at about 12years old.     Obesity     Other ill-defined conditions(799.89)     heart cath 2012    Skin lesion     Umbilical hernia without obstruction or gangrene     Unspecified sleep apnea     no diagnosed       Family History   Problem Relation Age of Onset  Diabetes Mother     Cancer Mother     Cancer Other      lung; grandfather-nos       Current Outpatient Prescriptions   Medication Sig Dispense Refill    gabapentin (NEURONTIN) 600 mg tablet Take 1 Tab by mouth three (3) times daily. 90 Tab 1    valsartan-hydroCHLOROthiazide (DIOVAN-HCT) 80-12.5 mg per tablet TAKE ONE TABLET BY MOUTH ONCE DAILY 30 Tab 1    traMADol (ULTRAM) 50 mg tablet Take 1 Tab by mouth every six (6) hours as needed for Pain. Max Daily Amount: 200 mg. 12 Tab 0    metFORMIN (GLUCOPHAGE) 500 mg tablet Take 1 Tab by mouth two (2) times daily (with meals). 60 Tab 1    labetalol (NORMODYNE) 100 mg tablet TAKE ONE TABLET BY MOUTH TWICE DAILY 60 Tab 2    aspirin delayed-release 81 mg tablet Take  by mouth daily.  IBUPROFEN PO Take  by mouth as needed.  magnesium 250 mg tab Take  by mouth.  b complex vitamins (B COMPLEX 1) tablet Take 1 Tab by mouth daily. Allergies   Allergen Reactions    Iodine Unknown (comments)       Past Surgical History:   Procedure Laterality Date    HX HEART CATHETERIZATION  07/30/12    HX HEENT      dental extract       Past Medical History:   Diagnosis Date    Acute sinusitis     BBB (bundle branch block)     Bursitis disorder     Cardiac cath 07/30/2012    R dom. Patent coronary arteries. LVEDP 15.  RA 10.  RV 32/10. PA 32/18. Mean PASP 22. W 14.  CO 5.7 (TD). High CO & high O2 sats on right raise question of peripheral arterial venous shunting.  Cardiac echocardiogram 06/27/2012    EF 50-55%. Dyssynergic septal motion c/w LBBB. RVSP/PASP 50-55. Mild LAE. Mild-mod TR. Mild IVCE. 30 x 5-mm echodensity in interventricular groove (poss fibrinous deposit). Sm pericardial effus at apex.  Hx of cardiac cath     Braeden's Daughters at about 12years old.     Obesity     Other ill-defined conditions(799.89)     heart cath 2012    Skin lesion     Umbilical hernia without obstruction or gangrene     Unspecified sleep apnea no diagnosed       Social History     Social History    Marital status:      Spouse name: N/A    Number of children: N/A    Years of education: N/A     Occupational History    Not on file. Social History Main Topics    Smoking status: Never Smoker    Smokeless tobacco: Never Used    Alcohol use 0.0 oz/week     2 - 3 Standard drinks or equivalent per week    Drug use: No    Sexual activity: Yes     Other Topics Concern    Not on file     Social History Narrative           REVIEW OF SYSTEMS:   CONSTITUTIONAL SYMPTOMS:  Negative. EYES:  Negative. EARS, NOSE, THROAT AND MOUTH:  Negative. CARDIOVASCULAR:  Negative. RESPIRATORY:  Negative. INTEGUMENTARY (SKIN AND/OR BREAST):  Negative. MUSCULOSKELETAL: Per HPI.   ENDOCRINE/RHEUMATOLOGIC:  Negative. NEUROLOGICAL:  Per HPI. HEMATOLOGIC/LYMPHATIC:  Negative. ALLERGIC/IMMUNOLOGIC:  Negative. PSYCHIATRIC:  Negative. PHYSICAL EXAMINATION:   Visit Vitals    /75    Pulse 85    Temp 98.2 °F (36.8 °C) (Oral)    Resp 18    Ht 5' 5\" (1.651 m)    Wt 223 lb (101.2 kg)    SpO2 98%    BMI 37.11 kg/m2    PAIN SCALE: 2/10    CONSTITUTIONAL: The patient is in no apparent distress and is alert and oriented x 3. HEENT: Normocephalic. Hearing grossly intact. NECK: Supple and symmetric. no tenderness, or masses were felt. RESPIRATORY: No labored breathing. CARDIOVASCULAR: The carotid pulses were normal. Peripheral pulses were 2+. CHEST: Normal AP diameter and normal contour without any kyphoscoliosis. LYMPHATIC: No lymphadenopathy was appreciated in the neck, axillae or groin. SKIN:  Negative for scars, rashes, lesions, or ulcers on the right upper, right lower, left upper, left lower and trunk. NEUROLOGICAL: Alert and oriented x 3. Incontinence. Ambulation without assistive device. FWB.   EXTREMITIES:  See musculoskeletal.  MUSCULOSKELETAL:   Head and Neck:  Negative for misalignment, asymmetry, crepitation, defects, tenderness masses or effusions.  Left Upper Extremity: Inspection, percussion and palpation preformed. Pierres sign is negative.  Right Upper Extremity: Inspection, percussion and palpation preformed. Pierres sign is negative.  Spine, Ribs and Pelvis: Back pain with radiating BLE pain. Inspection, percussion and palpation preformed. Negative for misalignment, asymmetry, crepitation, defects, tenderness masses or effusions.  Left Lower Extremity: Radiating pain. Inspection, percussion and palpation preformed. Negative straight leg raise.  Right Lower Extremity: Radiating pain. Inspection, percussion and palpation preformed. Negative straight leg raise. SPINE EXAM:     Lumbar spine: No rash, ecchymosis, or gross obliquity. No focal atrophy is noted. ASSESSMENT    ICD-10-CM ICD-9-CM    1. Epidural lipomatosis D17.79 214.8    2. Spinal stenosis, lumbar region with neurogenic claudication M48.062 724.03    3. Lumbar radiculopathy M54.16 724.4    4. Bowel and bladder incontinence R32 788.30     R15.9 787.60        Written by Kalli Gleason, as dictated by Marilyn Ortega MD.    I, Dr. Marilyn Ortega MD, confirm that all documentation is accurate.

## 2018-02-02 NOTE — PROGRESS NOTES
[address] [facility]   Pre-Surgical Worksheet    Patient: Sally Mccracken                         MRN: [id number]     Age:  56 y.o.,      Sex: male    YOB: 1970           ANTHONY: February 2, 2018  PCP: Rowan Bailey MD    Allergies   Allergen Reactions    Iodine Unknown (comments)         ICD-10-CM ICD-9-CM    1. Epidural lipomatosis D17.79 214.8    2. Spinal stenosis, lumbar region with neurogenic claudication M48.062 724.03    3. Lumbar radiculopathy M54.16 724.4    4. Bowel and bladder incontinence R32 788.30     R15.9 787.60        Surgery: L4/L5 C5/S1 Laminectomy. Pain Assessment   Pain Assessment  2/2/2018   Location of Pain Back   Pain Location Comment lower back, numbness and tingling in feet intermittently   Location Modifiers -   Severity of Pain 2   Quality of Pain Aching   Quality of Pain Comment -   Duration of Pain Persistent   Frequency of Pain Constant   Aggravating Factors Walking;Standing; Other (Comment)   Aggravating Factors Comment working   Limiting Behavior -   Relieving Factors Rest;Other (Comment)   Relieving Factors Comment mild activity, layinng down   Result of Injury No   Work-Related Injury -   Type of Injury -   Type of Injury Comment -       Visit Vitals    /75    Pulse 85    Temp 98.2 °F (36.8 °C) (Oral)    Resp 18    Ht 5' 5\" (1.651 m)    Wt 223 lb (101.2 kg)    SpO2 98%    BMI 37.11 kg/m2       ADL Limits: Bathing and Dressing. Patient states that his current condition limits him from a lot and he can not do activities for a prolonged timeframe without pain. Spine Surgery?: No    Spinal Injections?: No    Physical Therapy?: No    NSAID's?: Yes    Pain Medications?: Yes  Type: Tramadol    In Pain Management: No    Current Outpatient Prescriptions   Medication Sig    gabapentin (NEURONTIN) 600 mg tablet Take 1 Tab by mouth three (3) times daily.     valsartan-hydroCHLOROthiazide (DIOVAN-HCT) 80-12.5 mg per tablet TAKE ONE TABLET BY MOUTH ONCE DAILY    traMADol (ULTRAM) 50 mg tablet Take 1 Tab by mouth every six (6) hours as needed for Pain. Max Daily Amount: 200 mg.    metFORMIN (GLUCOPHAGE) 500 mg tablet Take 1 Tab by mouth two (2) times daily (with meals).  labetalol (NORMODYNE) 100 mg tablet TAKE ONE TABLET BY MOUTH TWICE DAILY    aspirin delayed-release 81 mg tablet Take  by mouth daily.  IBUPROFEN PO Take  by mouth as needed.  magnesium 250 mg tab Take  by mouth.  b complex vitamins (B COMPLEX 1) tablet Take 1 Tab by mouth daily. No current facility-administered medications for this visit. Past Medical History:   Diagnosis Date    Acute sinusitis     BBB (bundle branch block)     Bursitis disorder     Cardiac cath 07/30/2012    R dom. Patent coronary arteries. LVEDP 15.  RA 10.  RV 32/10. PA 32/18. Mean PASP 22. W 14.  CO 5.7 (TD). High CO & high O2 sats on right raise question of peripheral arterial venous shunting.  Cardiac echocardiogram 06/27/2012    EF 50-55%. Dyssynergic septal motion c/w LBBB. RVSP/PASP 50-55. Mild LAE. Mild-mod TR. Mild IVCE. 30 x 5-mm echodensity in interventricular groove (poss fibrinous deposit). Sm pericardial effus at apex.  Hx of cardiac cath     Braeden's Daughters at about 12years old.     Obesity     Other ill-defined conditions(799.89)     heart cath 2012    Skin lesion     Umbilical hernia without obstruction or gangrene     Unspecified sleep apnea     no diagnosed       Past Surgical History:   Procedure Laterality Date    HX HEART CATHETERIZATION  07/30/12    HX HEENT      dental extract       Social History     Social History    Marital status:      Spouse name: N/A    Number of children: N/A    Years of education: N/A     Social History Main Topics    Smoking status: Never Smoker    Smokeless tobacco: Never Used    Alcohol use 0.0 oz/week     2 - 3 Standard drinks or equivalent per week    Drug use: No    Sexual activity: Yes Other Topics Concern    None     Social History Narrative

## 2018-02-03 ENCOUNTER — HOSPITAL ENCOUNTER (OUTPATIENT)
Dept: PREADMISSION TESTING | Age: 48
Discharge: HOME | End: 2018-02-03
Payer: COMMERCIAL

## 2018-02-03 LAB
ALBUMIN SERPL-MCNC: 4.3 G/DL (ref 3.4–5)
ALBUMIN/GLOB SERPL: 1.5 {RATIO} (ref 0.8–1.7)
ALP SERPL-CCNC: 75 U/L (ref 45–117)
ALT SERPL-CCNC: 64 U/L (ref 16–61)
ANION GAP SERPL CALC-SCNC: 5 MMOL/L (ref 3–18)
AST SERPL-CCNC: 27 U/L (ref 15–37)
BILIRUB SERPL-MCNC: 0.6 MG/DL (ref 0.2–1)
BUN SERPL-MCNC: 12 MG/DL (ref 7–18)
BUN/CREAT SERPL: 14 (ref 12–20)
CALCIUM SERPL-MCNC: 9.4 MG/DL (ref 8.5–10.1)
CHLORIDE SERPL-SCNC: 100 MMOL/L (ref 100–108)
CO2 SERPL-SCNC: 31 MMOL/L (ref 21–32)
CREAT SERPL-MCNC: 0.86 MG/DL (ref 0.6–1.3)
ERYTHROCYTE [DISTWIDTH] IN BLOOD BY AUTOMATED COUNT: 12.5 % (ref 11.6–14.5)
GLOBULIN SER CALC-MCNC: 2.9 G/DL (ref 2–4)
GLUCOSE SERPL-MCNC: 178 MG/DL (ref 74–99)
HCT VFR BLD AUTO: 42.8 % (ref 36–48)
HGB BLD-MCNC: 15.2 G/DL (ref 13–16)
MCH RBC QN AUTO: 30.3 PG (ref 24–34)
MCHC RBC AUTO-ENTMCNC: 35.5 G/DL (ref 31–37)
MCV RBC AUTO: 85.4 FL (ref 74–97)
PLATELET # BLD AUTO: 223 K/UL (ref 135–420)
PMV BLD AUTO: 10.4 FL (ref 9.2–11.8)
POTASSIUM SERPL-SCNC: 4.2 MMOL/L (ref 3.5–5.5)
PROT SERPL-MCNC: 7.2 G/DL (ref 6.4–8.2)
RBC # BLD AUTO: 5.01 M/UL (ref 4.7–5.5)
SODIUM SERPL-SCNC: 136 MMOL/L (ref 136–145)
WBC # BLD AUTO: 6.9 K/UL (ref 4.6–13.2)

## 2018-02-03 PROCEDURE — 36415 COLL VENOUS BLD VENIPUNCTURE: CPT | Performed by: ORTHOPAEDIC SURGERY

## 2018-02-03 PROCEDURE — 85027 COMPLETE CBC AUTOMATED: CPT | Performed by: ORTHOPAEDIC SURGERY

## 2018-02-03 PROCEDURE — 80053 COMPREHEN METABOLIC PANEL: CPT | Performed by: ORTHOPAEDIC SURGERY

## 2018-02-05 ENCOUNTER — OFFICE VISIT (OUTPATIENT)
Dept: CARDIOLOGY CLINIC | Age: 48
End: 2018-02-05

## 2018-02-05 VITALS
OXYGEN SATURATION: 96 % | HEART RATE: 87 BPM | DIASTOLIC BLOOD PRESSURE: 76 MMHG | WEIGHT: 225 LBS | SYSTOLIC BLOOD PRESSURE: 128 MMHG | BODY MASS INDEX: 37.49 KG/M2 | HEIGHT: 65 IN

## 2018-02-05 DIAGNOSIS — R00.2 PALPITATIONS: ICD-10-CM

## 2018-02-05 DIAGNOSIS — I10 ESSENTIAL HYPERTENSION: ICD-10-CM

## 2018-02-05 DIAGNOSIS — I44.7 LBBB (LEFT BUNDLE BRANCH BLOCK): Primary | ICD-10-CM

## 2018-02-05 DIAGNOSIS — I27.20 PULMONARY HYPERTENSION (HCC): ICD-10-CM

## 2018-02-05 NOTE — LETTER
2/5/2018 10:52 AM 
 
Mr. Bang Calixto [address] [address] Bang Calixto was seen in our office on 02/05/2018 for cardiac evaluation. From a cardiac standpoint he is low risk for back surgery with Dr. Rohan East. He should stay on Diovan HCT and Labetalol perioperatively. Please feel free to contact our office if you have any questions regarding this patient. Sincerely, Karolina Rubin MD

## 2018-02-05 NOTE — MR AVS SNAPSHOT
[address] [place] [address] [address] [place] [address] 
[phone] Patient: Giulia Paniagua MRN: [id number] :1970 Visit Information Date & Time Provider Department Dept. Phone Encounter #  
 2018 10:20 AM Chirag Vora MD [facility] [address] [id number] Your Appointments 2018  8:15 AM  
PRE OP with Bart Pabon NP  
[facility] [place] ([facility]) Appt Note: PRE OP CLEARANCE  
 [address] [facility], [address] [facility] [address]  
[phone]  
  
   
 [address] [facility] [address] [facility] [address]  
  
    
 2018  2:30 PM  
Follow Up with Bart Pabon NP  
[facility] [place] ([facility]) Appt Note: 2 mo f/u  
 [address] [facility], [facility] [address] [place] [address]  
[phone]  
  
   
 [address] [facility], [facility] 6 [facility] [address]  
  
    
 2018 10:40 AM  
POST OP with Abner Saleem NP  
[facility] ([facility]) Appt Note: sx 2-8  
 [address] [facility] [address]  
[phone]  
  
   
 [address] [facility] [address] 3/13/2018  9:15 AM  
Follow Up with Bart Pabon NP  
[facility] [place] ([facility]) Appt Note: 4 mo f/u  
 [address] [facility], [address] [facility] [address]  
[phone]  
  
    
 3/23/2018 10:15 AM  
POST OP with Saurav Villagran MD  
[facility]) Appt Note: 6 wk fu sx 2-8  
 [address] [facility] [address]  
[phone]  
  
   
 [address] [facility] [address] Upcoming Health Maintenance Date Due DTaP/Tdap/Td series (1 - Tdap) 1992 COLONOSCOPY 2020 Allergies as of 2018  Review Complete On: 2018 By: Saurav Villagran, MD  
  
 Severity Noted Reaction Type Reactions Iodine  06/21/2012    Unknown (comments) Current Immunizations  Reviewed on 11/8/2017 No immunizations on file. Not reviewed this visit You Were Diagnosed With   
  
 Codes Comments LBBB (left bundle branch block)    -  Primary ICD-10-CM: I44.7 ICD-9-CM: 426.3 Essential hypertension     ICD-10-CM: I10 
ICD-9-CM: 401.9 Pulmonary hypertension     ICD-10-CM: I27.20 ICD-9-CM: 416.8 Palpitations     ICD-10-CM: R00.2 ICD-9-CM: 785.1 Vitals BP Pulse Height(growth percentile) Weight(growth percentile) SpO2 BMI  
 128/76 87 5' 5\" (1.651 m) 225 lb (102.1 kg) 96% 37.44 kg/m2 Smoking Status Never Smoker Vitals History BMI and BSA Data Body Mass Index Body Surface Area  
 37.44 kg/m 2 2.16 m 2 Preferred Pharmacy Pharmacy Name Phone [address], [address] [place] [address] [phone] Your Updated Medication List  
  
   
This list is accurate as of: 2/5/18 10:52 AM.  Always use your most recent med list.  
  
  
  
  
 aspirin delayed-release 81 mg tablet Take  by mouth daily. B COMPLEX 1 tablet Generic drug:  b complex vitamins Take 1 Tab by mouth daily. gabapentin 600 mg tablet Commonly known as:  NEURONTIN Take 1 Tab by mouth three (3) times daily. IBUPROFEN PO Take  by mouth as needed. labetalol 100 mg tablet Commonly known as:  NORMODYNE  
TAKE ONE TABLET BY MOUTH TWICE DAILY  
  
 magnesium 250 mg Tab Take  by mouth.  
  
 metFORMIN 500 mg tablet Commonly known as:  GLUCOPHAGE Take 1 Tab by mouth two (2) times daily (with meals). traMADol 50 mg tablet Commonly known as:  ULTRAM  
Take 1 Tab by mouth every six (6) hours as needed for Pain. Max Daily Amount: 200 mg.  
  
 valsartan-hydroCHLOROthiazide 80-12.5 mg per tablet Commonly known as:  DIOVAN-HCT  
 TAKE ONE TABLET BY MOUTH ONCE DAILY We Performed the Following AMB POC EKG ROUTINE W/ 12 LEADS, INTER & REP [61205 CPT(R)] Introducing [facility]! Joaquim Olivares introduces [employer] patient portal. Now you can access parts of your medical record, email your doctor's office, and request medication refills online. 1. In your internet browser, go to [email or web address] [email or web address] 2. Click on the First Time User? Click Here link in the Sign In box. You will see the New Member Sign Up page. 3. Enter your [employer] Access Code exactly as it appears below. You will not need to use this code after youve completed the sign-up process. If you do not sign up before the expiration date, you must request a new code. · [employer] Access Code: [email or web address] Expires: 2/5/2018  1:07 PM 
 
4. Enter the last four digits of your Social Security Number (xxxx) and Date of Birth (mm/dd/yyyy) as indicated and click Submit. You will be taken to the next sign-up page. 5. Create a [employer] ID. This will be your [employer] login ID and cannot be changed, so think of one that is secure and easy to remember. 6. Create a [employer] password. You can change your password at any time. 7. Enter your Password Reset Question and Answer. This can be used at a later time if you forget your password. 8. Enter your e-mail address. You will receive e-mail notification when new information is available in [address] 9. Click Sign Up. You can now view and download portions of your medical record. 10. Click the Download Summary menu link to download a portable copy of your medical information. If you have questions, please visit the Frequently Asked Questions section of the [employer] website. Remember, [employer] is NOT to be used for urgent needs. For medical emergencies, dial 911. Now available from your iPhone and Android! Please provide this summary of care documentation to your next provider. Your primary care clinician is listed as Elnor Presser. If you have any questions after today's visit, please call [phone].

## 2018-02-05 NOTE — PROGRESS NOTES
HISTORY OF PRESENT ILLNESS  Randell North is a 56 y.o. male. Dizziness   Pertinent negatives include no chest pain, no abdominal pain, no headaches and no shortness of breath. Patient presents for an add-on follow-up office visit. He has a past medical history significant for a known chronic left bundle branch block. He also has a history of questionable pulmonary hypertension based on echocardiogram in the past, so he underwent a cardiac catheterization back in 2012 which showed normal coronary anatomy, with upper normal PA pressures and no evidence of intracardiac shunting. Patient underwent follow-up noninvasive cardiac testing in January 2017. He underwent an echocardiogram which showed a low normal EF of 50-55% with mild concentric LVH and moderate pulmonary hypertension. His pharmacologic nuclear stress test was negative for ischemia. His echocardiogram was essentially unchanged compared to his previous study from 2012. He returns to the office today for preoperative cardiac evaluation prior to undergoing low back surgery which is scheduled for later this week. He denies any new shortness of breath at rest or with exertion. No chest pain or pressure. No orthopnea, PND or leg swelling. He does get occasional dizzy spells, but no zabrina syncope or near syncope. He reports his activity level has been poor because of low back pain, but he has not noted any symptoms with normal day-to-day activities of daily living. Past Medical History:   Diagnosis Date    Acute sinusitis     BBB (bundle branch block)     Bursitis disorder     Cardiac cath 07/30/2012    R dom. Patent coronary arteries. LVEDP 15.  RA 10.  RV 32/10. PA 32/18. Mean PASP 22. W 14.  CO 5.7 (TD). High CO & high O2 sats on right raise question of peripheral arterial venous shunting.  Cardiac echocardiogram 06/27/2012    EF 50-55%. Dyssynergic septal motion c/w LBBB. RVSP/PASP 50-55. Mild LAE. Mild-mod TR. Mild IVCE. 30 x 5-mm echodensity in interventricular groove (poss fibrinous deposit). Sm pericardial effus at apex.  Hx of cardiac cath     Braeden's Daughters at about 12years old.  Obesity     Other ill-defined conditions(799.89)     heart cath 2012    Skin lesion     Umbilical hernia without obstruction or gangrene     Unspecified sleep apnea     no diagnosed     Current Outpatient Prescriptions   Medication Sig Dispense Refill    gabapentin (NEURONTIN) 600 mg tablet Take 1 Tab by mouth three (3) times daily. 90 Tab 1    valsartan-hydroCHLOROthiazide (DIOVAN-HCT) 80-12.5 mg per tablet TAKE ONE TABLET BY MOUTH ONCE DAILY 30 Tab 1    traMADol (ULTRAM) 50 mg tablet Take 1 Tab by mouth every six (6) hours as needed for Pain. Max Daily Amount: 200 mg. 12 Tab 0    metFORMIN (GLUCOPHAGE) 500 mg tablet Take 1 Tab by mouth two (2) times daily (with meals). 60 Tab 1    labetalol (NORMODYNE) 100 mg tablet TAKE ONE TABLET BY MOUTH TWICE DAILY 60 Tab 2    aspirin delayed-release 81 mg tablet Take  by mouth daily.  magnesium 250 mg tab Take  by mouth.  b complex vitamins (B COMPLEX 1) tablet Take 1 Tab by mouth daily.  IBUPROFEN PO Take  by mouth as needed. Allergies   Allergen Reactions    Iodine Unknown (comments)      Social History   Substance Use Topics    Smoking status: Never Smoker    Smokeless tobacco: Never Used    Alcohol use 0.0 oz/week     2 - 3 Standard drinks or equivalent per week     Family History   Problem Relation Age of Onset    Diabetes Mother     Cancer Mother     Cancer Other      lung; grandfather-nos         Review of Systems   Constitutional: Negative for chills, fever, malaise/fatigue and weight loss. HENT: Negative for nosebleeds. Eyes: Negative for blurred vision and double vision. Respiratory: Negative for cough, shortness of breath and wheezing.     Cardiovascular: Negative for chest pain, palpitations, orthopnea, claudication, leg swelling and PND.   Gastrointestinal: Negative for abdominal pain, heartburn, nausea and vomiting. Genitourinary: Negative for dysuria and hematuria. Musculoskeletal: Positive for back pain. Negative for falls and myalgias. Skin: Negative for rash. Neurological: Positive for dizziness. Negative for focal weakness and headaches. Endo/Heme/Allergies: Does not bruise/bleed easily. Psychiatric/Behavioral: Negative for substance abuse. Visit Vitals    /76    Pulse 87    Ht 5' 5\" (1.651 m)    Wt 102.1 kg (225 lb)    SpO2 96%    BMI 37.44 kg/m2       Physical Exam   Constitutional: He is oriented to person, place, and time. He appears well-developed and well-nourished. HENT:   Head: Normocephalic and atraumatic. Eyes: Conjunctivae are normal.   Neck: Neck supple. No JVD present. Carotid bruit is not present. Cardiovascular: Normal rate, regular rhythm, S1 normal, S2 normal and normal pulses. Exam reveals no gallop and no S3. No murmur heard. Pulmonary/Chest: Breath sounds normal. He has no wheezes. He has no rales. Abdominal: Soft. Bowel sounds are normal. There is no tenderness. Musculoskeletal: He exhibits no edema, tenderness or deformity. Neurological: He is alert and oriented to person, place, and time. Skin: Skin is warm and dry. Psychiatric: He has a normal mood and affect. His behavior is normal. Thought content normal.     EKG:  Normal sinus rhythm, left axis deviation, atypical left bundle branch block, no change compared to his previous EKG. ASSESSMENT and PLAN    Low risk from a cardiac standpoint to proceed with back surgery as scheduled later this week. No further cardiac testing needed. He underwent noninvasive cardiac testing in January 2017 which are unchanged from his prior testing. Ejection fraction 50-55%. No ischemia on his nuclear stress test.  His EKG is unchanged. He should remain on his labetalol and valsartan HCTZ perioperatively.     Essential hypertension. Patient blood pressure appears to be well-controlled on his current medical regimen. Chronic left bundle branch block. Unchanged on EKG. patient underwent a cardiac catheterization in 2012 which did not show any significant coronary disease. He then underwent a pharmacologic nuclear stress test in January 2017 which did not show any ischemia. Diabetes mellitus, type II. This is been managed with oral agents. This followed closely by his PCP. Follow-up in 6 months, sooner if needed.

## 2018-02-05 NOTE — PROGRESS NOTES
1. Have you been to the ER, urgent care clinic since your last visit? Hospitalized since your last visit? No     2. Have you seen or consulted any other health care providers outside of the [address] [place] [address] since your last visit? Include any pap smears or colon screening.  No

## 2018-02-06 NOTE — H&P
Pre-Admission History and Physical    Patient: Santiago Seay   MRN: [id number]   SSN: [id number]   YOB: 1970   Age: 56 y.o. Sex: male     Patient scheduled for: L4/5 L5/S1 lami. Date of surgery: 2/8/18. Location of surgery:  [facility]. Surgeon: Ledy Palmer MD    HPI:  Santiago Seay is a 56 y.o. male with  progressive low back pain with bilateral lower extremity pain, numbness, and cramping. He reports a pain level of 2-10/10. radiographs demonstrate what appears to be some measure of moderate to severe epidural lipomatosis with constriction of the neural elements at L4-5 and L5-S1. This patient has failed the presurgical conservative treatments  including medications. Pain has impacted the patient's functional ability to bath, dress and limits his activity. He is being admitted for surgical intervention. Past Medical History:   Diagnosis Date    Acute sinusitis     BBB (bundle branch block)     Bursitis disorder     Cardiac cath 07/30/2012    R dom. Patent coronary arteries. LVEDP 15.  RA 10.  RV 32/10. PA 32/18. Mean PASP 22. W 14.  CO 5.7 (TD). High CO & high O2 sats on right raise question of peripheral arterial venous shunting.  Cardiac echocardiogram 06/27/2012    EF 50-55%. Dyssynergic septal motion c/w LBBB. RVSP/PASP 50-55. Mild LAE. Mild-mod TR. Mild IVCE. 30 x 5-mm echodensity in interventricular groove (poss fibrinous deposit). Sm pericardial effus at apex.  Hx of cardiac cath     Braeden's Daughters at about 12years old.     Hypertension     Obesity     Other ill-defined conditions(799.89)     heart cath 2012    Skin lesion     Umbilical hernia without obstruction or gangrene     Unspecified sleep apnea     no diagnosed     Social History     Social History    Marital status:      Spouse name: N/A    Number of children: N/A    Years of education: N/A     Social History Main Topics    Smoking status: Never Smoker    Smokeless tobacco: Never Used    Alcohol use 0.0 oz/week     2 - 3 Standard drinks or equivalent per week      Comment: \"few drinks every evening\"    Drug use: No    Sexual activity: Yes     Other Topics Concern    None     Social History Narrative     Past Surgical History:   Procedure Laterality Date    HX HEART CATHETERIZATION  07/30/12    HX HEENT      dental extract     Family History   Problem Relation Age of Onset    Diabetes Mother     Cancer Mother     Cancer Other      lung; grandfather-nos     Allergies   Allergen Reactions    Iodine Unknown (comments)     Current Outpatient Prescriptions   Medication Sig Dispense Refill    gabapentin (NEURONTIN) 600 mg tablet Take 1 Tab by mouth three (3) times daily. 90 Tab 1    valsartan-hydroCHLOROthiazide (DIOVAN-HCT) 80-12.5 mg per tablet TAKE ONE TABLET BY MOUTH ONCE DAILY 30 Tab 1    traMADol (ULTRAM) 50 mg tablet Take 1 Tab by mouth every six (6) hours as needed for Pain. Max Daily Amount: 200 mg. 12 Tab 0    metFORMIN (GLUCOPHAGE) 500 mg tablet Take 1 Tab by mouth two (2) times daily (with meals). 60 Tab 1    labetalol (NORMODYNE) 100 mg tablet TAKE ONE TABLET BY MOUTH TWICE DAILY 60 Tab 2    aspirin delayed-release 81 mg tablet Take  by mouth daily.  IBUPROFEN PO Take  by mouth as needed.  b complex vitamins (B COMPLEX 1) tablet Take 1 Tab by mouth daily. ROS:  Denies chills, fever,night sweats,  bowel or bladder dysfunction, unexplained weight loss/weight gain, chest pain, sob or anxiety. Physical Examination    Gen: Well developed, well nourished 56 y.o. male   Visit Vitals    /75    Pulse 85    Temp 98.2 °F (36.8 °C) (Oral)    Resp 18    Ht 5' 5\" (1.651 m)    Wt 223 lb (101.2 kg)    SpO2 98%    BMI 37.11 kg/m2    PAIN SCALE: 2/10     CONSTITUTIONAL: The patient is in no apparent distress and is alert and oriented x 3. HEENT: Normocephalic. Hearing grossly intact. NECK: Supple and symmetric.  no tenderness, or masses were felt. RESPIRATORY: No labored breathing. CARDIOVASCULAR: The carotid pulses were normal. Peripheral pulses were 2+. CHEST: Normal AP diameter and normal contour without any kyphoscoliosis. LYMPHATIC: No lymphadenopathy was appreciated in the neck, axillae or groin. SKIN:  Negative for scars, rashes, lesions, or ulcers on the right upper, right lower, left upper, left lower and trunk. NEUROLOGICAL: Alert and oriented x 3. Incontinence. Ambulation without assistive device. FWB. EXTREMITIES:  See musculoskeletal.  MUSCULOSKELETAL:  · Head and Neck:  Negative for misalignment, asymmetry, crepitation, defects, tenderness masses or effusions. · Left Upper Extremity: Inspection, percussion and palpation preformed. Pierres sign is negative. · Right Upper Extremity: Inspection, percussion and palpation preformed. Pierres sign is negative. · Spine, Ribs and Pelvis: Back pain with radiating BLE pain. Inspection, percussion and palpation preformed. Negative for misalignment, asymmetry, crepitation, defects, tenderness masses or effusions. · Left Lower Extremity: Radiating pain. Inspection, percussion and palpation preformed. Negative straight leg raise. · Right Lower Extremity: Radiating pain. Inspection, percussion and palpation preformed. Negative straight leg raise.        SPINE EXAM:      Lumbar spine: No rash, ecchymosis, or gross obliquity. No focal atrophy is noted.          Assessment and Plan    Due to the pt's persistent symptoms unrelieved by conservative measure Evans Taylor is being admitted to [facility] to undergo surgical intervention. The post-operative plan of care consists of physical therapy, home health and a 2 week f/u office visit. We are pending medical clearance by Dr. Santos Andrwes and cardiac clearance by Dr. Maryjane Mullins. The risks, benefits, complications and alternatives to surgery have been discussed in detail with the patient.   The patient understands and agrees to proceed.      Paul Coleman NP-C for Sonia Blood MD

## 2018-02-07 ENCOUNTER — OFFICE VISIT (OUTPATIENT)
Dept: INTERNAL MEDICINE CLINIC | Age: 48
End: 2018-02-07

## 2018-02-07 ENCOUNTER — ANESTHESIA EVENT (OUTPATIENT)
Dept: SURGERY | Age: 48
End: 2018-02-07
Payer: COMMERCIAL

## 2018-02-07 VITALS
DIASTOLIC BLOOD PRESSURE: 78 MMHG | SYSTOLIC BLOOD PRESSURE: 124 MMHG | OXYGEN SATURATION: 99 % | BODY MASS INDEX: 37.99 KG/M2 | TEMPERATURE: 97.7 F | RESPIRATION RATE: 16 BRPM | WEIGHT: 228 LBS | HEART RATE: 87 BPM | HEIGHT: 65 IN

## 2018-02-07 DIAGNOSIS — E11.8 CONTROLLED TYPE 2 DIABETES MELLITUS WITH COMPLICATION, WITHOUT LONG-TERM CURRENT USE OF INSULIN (HCC): ICD-10-CM

## 2018-02-07 DIAGNOSIS — K21.9 GASTROESOPHAGEAL REFLUX DISEASE WITHOUT ESOPHAGITIS: ICD-10-CM

## 2018-02-07 DIAGNOSIS — Z01.818 PREOPERATIVE EXAMINATION: Primary | ICD-10-CM

## 2018-02-07 DIAGNOSIS — M54.16 LUMBAR RADICULOPATHY: ICD-10-CM

## 2018-02-07 DIAGNOSIS — I10 ESSENTIAL HYPERTENSION: ICD-10-CM

## 2018-02-07 PROCEDURE — 77030027138 HC INCENT SPIROMETER -A

## 2018-02-07 RX ORDER — TRAMADOL HYDROCHLORIDE 50 MG/1
50 TABLET ORAL
Qty: 2 TAB | Refills: 0 | Status: SHIPPED | OUTPATIENT
Start: 2018-02-07 | End: 2018-02-27 | Stop reason: SDUPTHER

## 2018-02-07 NOTE — PROGRESS NOTES
Randell North is a 56 y.o. male presenting today for Pre-op Exam (laminectomy)  . HPI:  Randell North presents to the office today for preoperative examination. Patient is scheduled for Lumbar Laminectomy L4-5/L5-S1 surgery [facility] Dr[facility] Patient denies any chest pain, dyspnea, palpitations, abdominal pain, nausea or headache today. Review of Systems   Constitutional: Negative for chills and fever. HENT: Negative for congestion and sore throat. Respiratory: Negative for cough, sputum production and shortness of breath. Cardiovascular: Negative for chest pain, palpitations and leg swelling. Gastrointestinal: Negative for abdominal pain, diarrhea, heartburn and vomiting. Musculoskeletal: Positive for back pain (with radiculopathy). Neurological: Negative for dizziness and headaches. Allergies   Allergen Reactions    Iodine Unknown (comments)       Current Outpatient Prescriptions   Medication Sig Dispense Refill    traMADol (ULTRAM) 50 mg tablet Take 1 Tab by mouth every six (6) hours as needed for Pain. Max Daily Amount: 200 mg. 2 Tab 0    gabapentin (NEURONTIN) 600 mg tablet Take 1 Tab by mouth three (3) times daily. 90 Tab 1    valsartan-hydroCHLOROthiazide (DIOVAN-HCT) 80-12.5 mg per tablet TAKE ONE TABLET BY MOUTH ONCE DAILY 30 Tab 1    traMADol (ULTRAM) 50 mg tablet Take 1 Tab by mouth every six (6) hours as needed for Pain. Max Daily Amount: 200 mg. 12 Tab 0    metFORMIN (GLUCOPHAGE) 500 mg tablet Take 1 Tab by mouth two (2) times daily (with meals). 60 Tab 1    labetalol (NORMODYNE) 100 mg tablet TAKE ONE TABLET BY MOUTH TWICE DAILY 60 Tab 2    aspirin delayed-release 81 mg tablet Take  by mouth daily.  b complex vitamins (B COMPLEX 1) tablet Take 1 Tab by mouth daily.  IBUPROFEN PO Take  by mouth as needed.            Past Medical History:   Diagnosis Date    Acute sinusitis     BBB (bundle branch block)     Bursitis disorder     Cardiac cath 07/30/2012    R dom. Patent coronary arteries. LVEDP 15.  RA 10.  RV 32/10. PA 32/18. Mean PASP 22. W 14.  CO 5.7 (TD). High CO & high O2 sats on right raise question of peripheral arterial venous shunting.  Cardiac echocardiogram 06/27/2012    EF 50-55%. Dyssynergic septal motion c/w LBBB. RVSP/PASP 50-55. Mild LAE. Mild-mod TR. Mild IVCE. 30 x 5-mm echodensity in interventricular groove (poss fibrinous deposit). Sm pericardial effus at apex.  Hx of cardiac cath     Braeden's Daughters at about 12years old.  Hypertension     Obesity     Other ill-defined conditions(799.89)     heart cath 2012    Skin lesion     Umbilical hernia without obstruction or gangrene     Unspecified sleep apnea     no diagnosed       Past Surgical History:   Procedure Laterality Date    HX HEART CATHETERIZATION  07/30/12    HX HEENT      dental extract       Social History     Social History    Marital status:      Spouse name: N/A    Number of children: N/A    Years of education: N/A     Occupational History    Not on file. Social History Main Topics    Smoking status: Never Smoker    Smokeless tobacco: Never Used    Alcohol use 0.0 oz/week     2 - 3 Standard drinks or equivalent per week      Comment: \"few drinks every evening\"    Drug use: No    Sexual activity: Yes     Partners: Female     Other Topics Concern    Not on file     Social History Narrative       Patient does not have an advanced directive on file    Vitals:    02/07/18 0826   BP: 124/78   Pulse: 87   Resp: 16   Temp: 97.7 °F (36.5 °C)   TempSrc: Tympanic   SpO2: 99%   Weight: 228 lb (103.4 kg)   Height: 5' 5\" (1.651 m)   PainSc:   0 - No pain       Physical Exam   Constitutional: No distress. HENT:   Right Ear: External ear normal.   Left Ear: External ear normal.   Mouth/Throat: No oropharyngeal exudate. Neck: Normal range of motion. Neck supple.    Cardiovascular: Normal rate and regular rhythm. Exam reveals no gallop and no friction rub. Pulmonary/Chest: Effort normal and breath sounds normal.   Musculoskeletal:        Lumbar back: He exhibits decreased range of motion and pain. Neurological: He is alert. Skin: Skin is warm. Nursing note and vitals reviewed. Hospital Outpatient Visit on 02/03/2018   Component Date Value Ref Range Status    WBC 02/03/2018 6.9  4.6 - 13.2 K/uL Final    RBC 02/03/2018 5.01  4.70 - 5.50 M/uL Final    HGB 02/03/2018 15.2  13.0 - 16.0 g/dL Final    HCT 02/03/2018 42.8  36.0 - 48.0 % Final    MCV 02/03/2018 85.4  74.0 - 97.0 FL Final    MCH 02/03/2018 30.3  24.0 - 34.0 PG Final    MCHC 02/03/2018 35.5  31.0 - 37.0 g/dL Final    RDW 02/03/2018 12.5  11.6 - 14.5 % Final    PLATELET 15/53/2446 564  135 - 420 K/uL Final    MPV 02/03/2018 10.4  9.2 - 11.8 FL Final    Sodium 02/03/2018 136  136 - 145 mmol/L Final    Potassium 02/03/2018 4.2  3.5 - 5.5 mmol/L Final    Chloride 02/03/2018 100  100 - 108 mmol/L Final    CO2 02/03/2018 31  21 - 32 mmol/L Final    Anion gap 02/03/2018 5  3.0 - 18 mmol/L Final    Glucose 02/03/2018 178* 74 - 99 mg/dL Final    BUN 02/03/2018 12  7.0 - 18 MG/DL Final    Creatinine 02/03/2018 0.86  0.6 - 1.3 MG/DL Final    BUN/Creatinine ratio 02/03/2018 14  12 - 20   Final    GFR est AA 02/03/2018 >60  >60 ml/min/1.73m2 Final    GFR est non-AA 02/03/2018 >60  >60 ml/min/1.73m2 Final    Comment: (NOTE)  Estimated GFR is calculated using the Modification of Diet in Renal   Disease (MDRD) Study equation, reported for both  Americans   (GFRAA) and non- Americans (GFRNA), and normalized to 1.73m2   body surface area. The physician must decide which value applies to   the patient. The MDRD study equation should only be used in   individuals age 25 or older.  It has not been validated for the   following: pregnant women, patients with serious comorbid conditions,   or on certain medications, or persons with extremes of body size,   muscle mass, or nutritional status.  Calcium 02/03/2018 9.4  8.5 - 10.1 MG/DL Final    Bilirubin, total 02/03/2018 0.6  0.2 - 1.0 MG/DL Final    ALT (SGPT) 02/03/2018 64* 16 - 61 U/L Final    AST (SGOT) 02/03/2018 27  15 - 37 U/L Final    Alk. phosphatase 02/03/2018 75  45 - 117 U/L Final    Protein, total 02/03/2018 7.2  6.4 - 8.2 g/dL Final    Albumin 02/03/2018 4.3  3.4 - 5.0 g/dL Final    Globulin 02/03/2018 2.9  2.0 - 4.0 g/dL Final    A-G Ratio 02/03/2018 1.5  0.8 - 1.7   Final   Office Visit on 12/11/2017   Component Date Value Ref Range Status    Hemoglobin A1c (POC) 12/11/2017 8.0  % Final       .No results found for any visits on 02/07/18. Assessment / Plan:      ICD-10-CM ICD-9-CM    1. Preoperative examination Z01.818 V72.84    2. Lumbar radiculopathy M54.16 724.4 traMADol (ULTRAM) 50 mg tablet   3. Essential hypertension I10 401.9    4. Gastroesophageal reflux disease without esophagitis K21.9 530.81    5. Controlled type 2 diabetes mellitus with complication, without long-term current use of insulin (HCC) E11.8 250.90      Patient is optimized for Lumbar Laminectomy L4-5/L5-S1 surgery with Dr. Sadaf Mcfarland on 02/08/17     HTN- controlled    Follow-up Disposition:  Return if symptoms worsen or fail to improve. I asked the patient if he  had any questions and answered his  questions.   The patient stated that he understands the treatment plan and agrees with the treatment plan

## 2018-02-07 NOTE — PROGRESS NOTES
1. Have you been to the ER, urgent care clinic since your last visit? Hospitalized since your last visit? No    2. Have you seen or consulted any other health care providers outside of the [address] [place] [address] since your last visit? Include any pap smears or colon screening.  No

## 2018-02-08 ENCOUNTER — HOSPITAL ENCOUNTER (OUTPATIENT)
Age: 48
Setting detail: OBSERVATION
Discharge: HOME | End: 2018-02-09
Attending: ORTHOPAEDIC SURGERY | Admitting: ORTHOPAEDIC SURGERY
Payer: COMMERCIAL

## 2018-02-08 ENCOUNTER — ANESTHESIA (OUTPATIENT)
Dept: SURGERY | Age: 48
End: 2018-02-08
Payer: COMMERCIAL

## 2018-02-08 ENCOUNTER — APPOINTMENT (OUTPATIENT)
Dept: GENERAL RADIOLOGY | Age: 48
End: 2018-02-08
Attending: ORTHOPAEDIC SURGERY
Payer: COMMERCIAL

## 2018-02-08 DIAGNOSIS — M48.062 SPINAL STENOSIS, LUMBAR REGION WITH NEUROGENIC CLAUDICATION: Primary | ICD-10-CM

## 2018-02-08 PROBLEM — G83.4 CAUDA EQUINA COMPRESSION (HCC): Status: ACTIVE | Noted: 2018-02-08

## 2018-02-08 LAB
EST. AVERAGE GLUCOSE BLD GHB EST-MCNC: 174 MG/DL
GLUCOSE BLD STRIP.AUTO-MCNC: 161 MG/DL (ref 70–110)
GLUCOSE BLD STRIP.AUTO-MCNC: 198 MG/DL (ref 70–110)
GLUCOSE BLD STRIP.AUTO-MCNC: 202 MG/DL (ref 70–110)
HBA1C MFR BLD: 7.7 % (ref 4.2–5.6)

## 2018-02-08 PROCEDURE — 76010000153 HC OR TIME 1.5 TO 2 HR: Performed by: ORTHOPAEDIC SURGERY

## 2018-02-08 PROCEDURE — 74011000250 HC RX REV CODE- 250

## 2018-02-08 PROCEDURE — 77030020782 HC GWN BAIR PAWS FLX 3M -B: Performed by: ORTHOPAEDIC SURGERY

## 2018-02-08 PROCEDURE — 74011250637 HC RX REV CODE- 250/637: Performed by: NURSE ANESTHETIST, CERTIFIED REGISTERED

## 2018-02-08 PROCEDURE — 77030008683 HC TU ET CUF COVD -A: Performed by: ANESTHESIOLOGY

## 2018-02-08 PROCEDURE — 77030013079 HC BLNKT BAIR HGGR 3M -A: Performed by: ORTHOPAEDIC SURGERY

## 2018-02-08 PROCEDURE — 74011000272 HC RX REV CODE- 272: Performed by: ORTHOPAEDIC SURGERY

## 2018-02-08 PROCEDURE — 77030037134 HC WRAP COMPR BACK THER SOLM -B: Performed by: ORTHOPAEDIC SURGERY

## 2018-02-08 PROCEDURE — 74011636637 HC RX REV CODE- 636/637: Performed by: ANESTHESIOLOGY

## 2018-02-08 PROCEDURE — 77030012891

## 2018-02-08 PROCEDURE — 74011250636 HC RX REV CODE- 250/636: Performed by: NURSE PRACTITIONER

## 2018-02-08 PROCEDURE — 77030003029 HC SUT VCRL J&J -B: Performed by: ORTHOPAEDIC SURGERY

## 2018-02-08 PROCEDURE — 74011636637 HC RX REV CODE- 636/637: Performed by: NURSE ANESTHETIST, CERTIFIED REGISTERED

## 2018-02-08 PROCEDURE — 74011000250 HC RX REV CODE- 250: Performed by: ORTHOPAEDIC SURGERY

## 2018-02-08 PROCEDURE — 74011250636 HC RX REV CODE- 250/636: Performed by: NURSE ANESTHETIST, CERTIFIED REGISTERED

## 2018-02-08 PROCEDURE — 74011250636 HC RX REV CODE- 250/636

## 2018-02-08 PROCEDURE — 76210000016 HC OR PH I REC 1 TO 1.5 HR: Performed by: ORTHOPAEDIC SURGERY

## 2018-02-08 PROCEDURE — 82962 GLUCOSE BLOOD TEST: CPT

## 2018-02-08 PROCEDURE — 77030018836 HC SOL IRR NACL ICUM -A: Performed by: ORTHOPAEDIC SURGERY

## 2018-02-08 PROCEDURE — 77030002933 HC SUT MCRYL J&J -A: Performed by: ORTHOPAEDIC SURGERY

## 2018-02-08 PROCEDURE — 99218 HC RM OBSERVATION: CPT

## 2018-02-08 PROCEDURE — 74011250637 HC RX REV CODE- 250/637: Performed by: ORTHOPAEDIC SURGERY

## 2018-02-08 PROCEDURE — 36415 COLL VENOUS BLD VENIPUNCTURE: CPT | Performed by: NURSE PRACTITIONER

## 2018-02-08 PROCEDURE — 77030004402 HC BUR NEUR STRY -C: Performed by: ORTHOPAEDIC SURGERY

## 2018-02-08 PROCEDURE — 77030029372 HC ADH SKN CLSR PRINEO J&J -C: Performed by: ORTHOPAEDIC SURGERY

## 2018-02-08 PROCEDURE — 77030030163 HC BN WAX J&J -A: Performed by: ORTHOPAEDIC SURGERY

## 2018-02-08 PROCEDURE — 77030013079 HC BLNKT BAIR HGGR 3M -A: Performed by: ANESTHESIOLOGY

## 2018-02-08 PROCEDURE — 74011250636 HC RX REV CODE- 250/636: Performed by: ORTHOPAEDIC SURGERY

## 2018-02-08 PROCEDURE — 76060000034 HC ANESTHESIA 1.5 TO 2 HR: Performed by: ORTHOPAEDIC SURGERY

## 2018-02-08 PROCEDURE — 83036 HEMOGLOBIN GLYCOSYLATED A1C: CPT | Performed by: NURSE PRACTITIONER

## 2018-02-08 PROCEDURE — 77030011640 HC PAD GRND REM COVD -A: Performed by: ORTHOPAEDIC SURGERY

## 2018-02-08 RX ORDER — SODIUM CHLORIDE 0.9 % (FLUSH) 0.9 %
5-10 SYRINGE (ML) INJECTION EVERY 8 HOURS
Status: DISCONTINUED | OUTPATIENT
Start: 2018-02-08 | End: 2018-02-09 | Stop reason: HOSPADM

## 2018-02-08 RX ORDER — DEXTROSE 50 % IN WATER (D50W) INTRAVENOUS SYRINGE
25-50 AS NEEDED
Status: DISCONTINUED | OUTPATIENT
Start: 2018-02-08 | End: 2018-02-08

## 2018-02-08 RX ORDER — NEOSTIGMINE METHYLSULFATE 1 MG/ML
INJECTION INTRAVENOUS AS NEEDED
Status: DISCONTINUED | OUTPATIENT
Start: 2018-02-08 | End: 2018-02-08 | Stop reason: HOSPADM

## 2018-02-08 RX ORDER — DEXAMETHASONE SODIUM PHOSPHATE 4 MG/ML
INJECTION, SOLUTION INTRA-ARTICULAR; INTRALESIONAL; INTRAMUSCULAR; INTRAVENOUS; SOFT TISSUE AS NEEDED
Status: DISCONTINUED | OUTPATIENT
Start: 2018-02-08 | End: 2018-02-08 | Stop reason: HOSPADM

## 2018-02-08 RX ORDER — SODIUM CHLORIDE, SODIUM LACTATE, POTASSIUM CHLORIDE, CALCIUM CHLORIDE 600; 310; 30; 20 MG/100ML; MG/100ML; MG/100ML; MG/100ML
50 INJECTION, SOLUTION INTRAVENOUS CONTINUOUS
Status: DISCONTINUED | OUTPATIENT
Start: 2018-02-08 | End: 2018-02-08 | Stop reason: HOSPADM

## 2018-02-08 RX ORDER — GABAPENTIN 300 MG/1
300 CAPSULE ORAL 3 TIMES DAILY
Status: DISCONTINUED | OUTPATIENT
Start: 2018-02-08 | End: 2018-02-09 | Stop reason: HOSPADM

## 2018-02-08 RX ORDER — FAMOTIDINE 20 MG/1
20 TABLET, FILM COATED ORAL ONCE
Status: COMPLETED | OUTPATIENT
Start: 2018-02-08 | End: 2018-02-08

## 2018-02-08 RX ORDER — ONDANSETRON 2 MG/ML
4 INJECTION INTRAMUSCULAR; INTRAVENOUS ONCE
Status: DISCONTINUED | OUTPATIENT
Start: 2018-02-08 | End: 2018-02-08 | Stop reason: HOSPADM

## 2018-02-08 RX ORDER — SODIUM CHLORIDE 0.9 % (FLUSH) 0.9 %
5-10 SYRINGE (ML) INJECTION AS NEEDED
Status: DISCONTINUED | OUTPATIENT
Start: 2018-02-08 | End: 2018-02-08 | Stop reason: HOSPADM

## 2018-02-08 RX ORDER — DIPHENHYDRAMINE HYDROCHLORIDE 50 MG/ML
12.5 INJECTION, SOLUTION INTRAMUSCULAR; INTRAVENOUS
Status: DISCONTINUED | OUTPATIENT
Start: 2018-02-08 | End: 2018-02-08 | Stop reason: HOSPADM

## 2018-02-08 RX ORDER — VANCOMYCIN HYDROCHLORIDE 1 G/20ML
INJECTION, POWDER, LYOPHILIZED, FOR SOLUTION INTRAVENOUS AS NEEDED
Status: DISCONTINUED | OUTPATIENT
Start: 2018-02-08 | End: 2018-02-08 | Stop reason: HOSPADM

## 2018-02-08 RX ORDER — DIAZEPAM 5 MG/1
5 TABLET ORAL
Status: DISCONTINUED | OUTPATIENT
Start: 2018-02-08 | End: 2018-02-09 | Stop reason: HOSPADM

## 2018-02-08 RX ORDER — HYDROMORPHONE HYDROCHLORIDE 2 MG/ML
0.5 INJECTION, SOLUTION INTRAMUSCULAR; INTRAVENOUS; SUBCUTANEOUS
Status: DISCONTINUED | OUTPATIENT
Start: 2018-02-08 | End: 2018-02-08 | Stop reason: HOSPADM

## 2018-02-08 RX ORDER — ALBUTEROL SULFATE 0.83 MG/ML
2.5 SOLUTION RESPIRATORY (INHALATION) AS NEEDED
Status: DISCONTINUED | OUTPATIENT
Start: 2018-02-08 | End: 2018-02-08 | Stop reason: HOSPADM

## 2018-02-08 RX ORDER — CEFAZOLIN SODIUM 2 G/50ML
2 SOLUTION INTRAVENOUS EVERY 8 HOURS
Status: DISCONTINUED | OUTPATIENT
Start: 2018-02-08 | End: 2018-02-09 | Stop reason: HOSPADM

## 2018-02-08 RX ORDER — SODIUM CHLORIDE 0.9 % (FLUSH) 0.9 %
5-10 SYRINGE (ML) INJECTION EVERY 8 HOURS
Status: DISCONTINUED | OUTPATIENT
Start: 2018-02-08 | End: 2018-02-08 | Stop reason: HOSPADM

## 2018-02-08 RX ORDER — ONDANSETRON 2 MG/ML
4 INJECTION INTRAMUSCULAR; INTRAVENOUS
Status: DISCONTINUED | OUTPATIENT
Start: 2018-02-08 | End: 2018-02-09 | Stop reason: HOSPADM

## 2018-02-08 RX ORDER — GLYCOPYRROLATE 0.2 MG/ML
INJECTION INTRAMUSCULAR; INTRAVENOUS AS NEEDED
Status: DISCONTINUED | OUTPATIENT
Start: 2018-02-08 | End: 2018-02-08 | Stop reason: HOSPADM

## 2018-02-08 RX ORDER — MIDAZOLAM HYDROCHLORIDE 1 MG/ML
INJECTION, SOLUTION INTRAMUSCULAR; INTRAVENOUS AS NEEDED
Status: DISCONTINUED | OUTPATIENT
Start: 2018-02-08 | End: 2018-02-08 | Stop reason: HOSPADM

## 2018-02-08 RX ORDER — HYDROMORPHONE HYDROCHLORIDE 1 MG/ML
INJECTION, SOLUTION INTRAMUSCULAR; INTRAVENOUS; SUBCUTANEOUS
Status: DISPENSED
Start: 2018-02-08 | End: 2018-02-09

## 2018-02-08 RX ORDER — CEFAZOLIN SODIUM 2 G/50ML
2 SOLUTION INTRAVENOUS ONCE
Status: COMPLETED | OUTPATIENT
Start: 2018-02-08 | End: 2018-02-08

## 2018-02-08 RX ORDER — INSULIN LISPRO 100 [IU]/ML
INJECTION, SOLUTION INTRAVENOUS; SUBCUTANEOUS ONCE
Status: COMPLETED | OUTPATIENT
Start: 2018-02-08 | End: 2018-02-08

## 2018-02-08 RX ORDER — SUCCINYLCHOLINE CHLORIDE 20 MG/ML
INJECTION INTRAMUSCULAR; INTRAVENOUS AS NEEDED
Status: DISCONTINUED | OUTPATIENT
Start: 2018-02-08 | End: 2018-02-08 | Stop reason: HOSPADM

## 2018-02-08 RX ORDER — METFORMIN HYDROCHLORIDE 500 MG/1
500 TABLET ORAL 2 TIMES DAILY WITH MEALS
Status: CANCELLED | OUTPATIENT
Start: 2018-02-08

## 2018-02-08 RX ORDER — MAGNESIUM SULFATE 100 %
16 CRYSTALS MISCELLANEOUS AS NEEDED
Status: DISCONTINUED | OUTPATIENT
Start: 2018-02-08 | End: 2018-02-08

## 2018-02-08 RX ORDER — NALOXONE HYDROCHLORIDE 0.4 MG/ML
0.4 INJECTION, SOLUTION INTRAMUSCULAR; INTRAVENOUS; SUBCUTANEOUS AS NEEDED
Status: DISCONTINUED | OUTPATIENT
Start: 2018-02-08 | End: 2018-02-09 | Stop reason: HOSPADM

## 2018-02-08 RX ORDER — LIDOCAINE HYDROCHLORIDE 20 MG/ML
INJECTION, SOLUTION EPIDURAL; INFILTRATION; INTRACAUDAL; PERINEURAL AS NEEDED
Status: DISCONTINUED | OUTPATIENT
Start: 2018-02-08 | End: 2018-02-08 | Stop reason: HOSPADM

## 2018-02-08 RX ORDER — HYDROMORPHONE HYDROCHLORIDE 1 MG/ML
INJECTION, SOLUTION INTRAMUSCULAR; INTRAVENOUS; SUBCUTANEOUS AS NEEDED
Status: DISCONTINUED | OUTPATIENT
Start: 2018-02-08 | End: 2018-02-08 | Stop reason: HOSPADM

## 2018-02-08 RX ORDER — MAGNESIUM SULFATE 100 %
4 CRYSTALS MISCELLANEOUS AS NEEDED
Status: DISCONTINUED | OUTPATIENT
Start: 2018-02-08 | End: 2018-02-09 | Stop reason: HOSPADM

## 2018-02-08 RX ORDER — HYDROMORPHONE HYDROCHLORIDE 1 MG/ML
1 INJECTION, SOLUTION INTRAMUSCULAR; INTRAVENOUS; SUBCUTANEOUS
Status: DISCONTINUED | OUTPATIENT
Start: 2018-02-08 | End: 2018-02-09 | Stop reason: HOSPADM

## 2018-02-08 RX ORDER — ACETAMINOPHEN 500 MG
1000 TABLET ORAL EVERY 6 HOURS
Status: DISCONTINUED | OUTPATIENT
Start: 2018-02-08 | End: 2018-02-09 | Stop reason: HOSPADM

## 2018-02-08 RX ORDER — INSULIN LISPRO 100 [IU]/ML
INJECTION, SOLUTION INTRAVENOUS; SUBCUTANEOUS
Status: DISCONTINUED | OUTPATIENT
Start: 2018-02-08 | End: 2018-02-09 | Stop reason: HOSPADM

## 2018-02-08 RX ORDER — PROPOFOL 10 MG/ML
INJECTION, EMULSION INTRAVENOUS AS NEEDED
Status: DISCONTINUED | OUTPATIENT
Start: 2018-02-08 | End: 2018-02-08 | Stop reason: HOSPADM

## 2018-02-08 RX ORDER — ONDANSETRON 2 MG/ML
INJECTION INTRAMUSCULAR; INTRAVENOUS AS NEEDED
Status: DISCONTINUED | OUTPATIENT
Start: 2018-02-08 | End: 2018-02-08 | Stop reason: HOSPADM

## 2018-02-08 RX ORDER — SODIUM CHLORIDE 0.9 % (FLUSH) 0.9 %
5-10 SYRINGE (ML) INJECTION AS NEEDED
Status: DISCONTINUED | OUTPATIENT
Start: 2018-02-08 | End: 2018-02-09 | Stop reason: HOSPADM

## 2018-02-08 RX ORDER — ROCURONIUM BROMIDE 10 MG/ML
INJECTION, SOLUTION INTRAVENOUS AS NEEDED
Status: DISCONTINUED | OUTPATIENT
Start: 2018-02-08 | End: 2018-02-08 | Stop reason: HOSPADM

## 2018-02-08 RX ORDER — LIDOCAINE HYDROCHLORIDE 10 MG/ML
0.1 INJECTION, SOLUTION EPIDURAL; INFILTRATION; INTRACAUDAL; PERINEURAL AS NEEDED
Status: DISCONTINUED | OUTPATIENT
Start: 2018-02-08 | End: 2018-02-08 | Stop reason: HOSPADM

## 2018-02-08 RX ORDER — DIPHENHYDRAMINE HCL 25 MG
25 CAPSULE ORAL
Status: DISCONTINUED | OUTPATIENT
Start: 2018-02-08 | End: 2018-02-09 | Stop reason: HOSPADM

## 2018-02-08 RX ORDER — DEXTROSE 50 % IN WATER (D50W) INTRAVENOUS SYRINGE
25-50 AS NEEDED
Status: DISCONTINUED | OUTPATIENT
Start: 2018-02-08 | End: 2018-02-09 | Stop reason: HOSPADM

## 2018-02-08 RX ORDER — LORAZEPAM 2 MG/ML
1 INJECTION INTRAMUSCULAR
Status: DISCONTINUED | OUTPATIENT
Start: 2018-02-08 | End: 2018-02-09 | Stop reason: HOSPADM

## 2018-02-08 RX ORDER — NALOXONE HYDROCHLORIDE 0.4 MG/ML
0.04 INJECTION, SOLUTION INTRAMUSCULAR; INTRAVENOUS; SUBCUTANEOUS AS NEEDED
Status: DISCONTINUED | OUTPATIENT
Start: 2018-02-08 | End: 2018-02-08 | Stop reason: HOSPADM

## 2018-02-08 RX ORDER — LOSARTAN POTASSIUM AND HYDROCHLOROTHIAZIDE 25; 100 MG/1; MG/1
1 TABLET ORAL DAILY
Status: DISCONTINUED | OUTPATIENT
Start: 2018-02-09 | End: 2018-02-09 | Stop reason: HOSPADM

## 2018-02-08 RX ORDER — KETOROLAC TROMETHAMINE 30 MG/ML
INJECTION, SOLUTION INTRAMUSCULAR; INTRAVENOUS AS NEEDED
Status: DISCONTINUED | OUTPATIENT
Start: 2018-02-08 | End: 2018-02-08 | Stop reason: HOSPADM

## 2018-02-08 RX ORDER — FENTANYL CITRATE 50 UG/ML
INJECTION, SOLUTION INTRAMUSCULAR; INTRAVENOUS AS NEEDED
Status: DISCONTINUED | OUTPATIENT
Start: 2018-02-08 | End: 2018-02-08 | Stop reason: HOSPADM

## 2018-02-08 RX ORDER — BUPIVACAINE HYDROCHLORIDE 5 MG/ML
INJECTION, SOLUTION EPIDURAL; INTRACAUDAL AS NEEDED
Status: DISCONTINUED | OUTPATIENT
Start: 2018-02-08 | End: 2018-02-08 | Stop reason: HOSPADM

## 2018-02-08 RX ORDER — LABETALOL 100 MG/1
100 TABLET, FILM COATED ORAL 2 TIMES DAILY
Status: DISCONTINUED | OUTPATIENT
Start: 2018-02-08 | End: 2018-02-09 | Stop reason: HOSPADM

## 2018-02-08 RX ORDER — DIPHENHYDRAMINE HYDROCHLORIDE 50 MG/ML
12.5 INJECTION, SOLUTION INTRAMUSCULAR; INTRAVENOUS
Status: DISCONTINUED | OUTPATIENT
Start: 2018-02-08 | End: 2018-02-09 | Stop reason: HOSPADM

## 2018-02-08 RX ORDER — HYDROMORPHONE HYDROCHLORIDE 1 MG/ML
INJECTION, SOLUTION INTRAMUSCULAR; INTRAVENOUS; SUBCUTANEOUS
Status: COMPLETED
Start: 2018-02-08 | End: 2018-02-08

## 2018-02-08 RX ADMIN — CEFAZOLIN SODIUM 2 G: 2 SOLUTION INTRAVENOUS at 22:00

## 2018-02-08 RX ADMIN — PROPOFOL 50 MG: 10 INJECTION, EMULSION INTRAVENOUS at 15:30

## 2018-02-08 RX ADMIN — NEOSTIGMINE METHYLSULFATE 4 MG: 1 INJECTION INTRAVENOUS at 15:23

## 2018-02-08 RX ADMIN — ROCURONIUM BROMIDE 5 MG: 10 INJECTION, SOLUTION INTRAVENOUS at 14:24

## 2018-02-08 RX ADMIN — SODIUM CHLORIDE, SODIUM LACTATE, POTASSIUM CHLORIDE, AND CALCIUM CHLORIDE 50 ML: 600; 310; 30; 20 INJECTION, SOLUTION INTRAVENOUS at 16:00

## 2018-02-08 RX ADMIN — DEXAMETHASONE SODIUM PHOSPHATE 8 MG: 4 INJECTION, SOLUTION INTRA-ARTICULAR; INTRALESIONAL; INTRAMUSCULAR; INTRAVENOUS; SOFT TISSUE at 14:24

## 2018-02-08 RX ADMIN — GLYCOPYRROLATE 0.2 MG: 0.2 INJECTION INTRAMUSCULAR; INTRAVENOUS at 14:16

## 2018-02-08 RX ADMIN — PROPOFOL 50 MG: 10 INJECTION, EMULSION INTRAVENOUS at 15:10

## 2018-02-08 RX ADMIN — FENTANYL CITRATE 100 MCG: 50 INJECTION, SOLUTION INTRAMUSCULAR; INTRAVENOUS at 15:10

## 2018-02-08 RX ADMIN — SODIUM CHLORIDE, SODIUM LACTATE, POTASSIUM CHLORIDE, AND CALCIUM CHLORIDE 50 ML/HR: 600; 310; 30; 20 INJECTION, SOLUTION INTRAVENOUS at 12:21

## 2018-02-08 RX ADMIN — LABETALOL HYDROCHLORIDE 100 MG: 100 TABLET, FILM COATED ORAL at 20:25

## 2018-02-08 RX ADMIN — INSULIN LISPRO 6 UNITS: 100 INJECTION, SOLUTION INTRAVENOUS; SUBCUTANEOUS at 12:49

## 2018-02-08 RX ADMIN — ONDANSETRON 4 MG: 2 INJECTION INTRAMUSCULAR; INTRAVENOUS at 14:16

## 2018-02-08 RX ADMIN — SUCCINYLCHOLINE CHLORIDE 140 MG: 20 INJECTION INTRAMUSCULAR; INTRAVENOUS at 14:24

## 2018-02-08 RX ADMIN — SODIUM CHLORIDE, SODIUM LACTATE, POTASSIUM CHLORIDE, AND CALCIUM CHLORIDE: 600; 310; 30; 20 INJECTION, SOLUTION INTRAVENOUS at 14:59

## 2018-02-08 RX ADMIN — INSULIN LISPRO 2 UNITS: 100 INJECTION, SOLUTION INTRAVENOUS; SUBCUTANEOUS at 16:32

## 2018-02-08 RX ADMIN — PROPOFOL 200 MG: 10 INJECTION, EMULSION INTRAVENOUS at 14:24

## 2018-02-08 RX ADMIN — MIDAZOLAM HYDROCHLORIDE 2 MG: 1 INJECTION, SOLUTION INTRAMUSCULAR; INTRAVENOUS at 14:16

## 2018-02-08 RX ADMIN — GABAPENTIN 300 MG: 300 CAPSULE ORAL at 19:31

## 2018-02-08 RX ADMIN — FENTANYL CITRATE 100 MCG: 50 INJECTION, SOLUTION INTRAMUSCULAR; INTRAVENOUS at 14:24

## 2018-02-08 RX ADMIN — KETOROLAC TROMETHAMINE 30 MG: 30 INJECTION, SOLUTION INTRAMUSCULAR; INTRAVENOUS at 15:23

## 2018-02-08 RX ADMIN — HYDROMORPHONE HYDROCHLORIDE 1 MG: 1 INJECTION, SOLUTION INTRAMUSCULAR; INTRAVENOUS; SUBCUTANEOUS at 19:30

## 2018-02-08 RX ADMIN — SODIUM CHLORIDE, SODIUM LACTATE, POTASSIUM CHLORIDE, AND CALCIUM CHLORIDE: 600; 310; 30; 20 INJECTION, SOLUTION INTRAVENOUS at 14:16

## 2018-02-08 RX ADMIN — HYDROMORPHONE HYDROCHLORIDE 0.5 MG: 2 INJECTION, SOLUTION INTRAMUSCULAR; INTRAVENOUS; SUBCUTANEOUS at 16:25

## 2018-02-08 RX ADMIN — HYDROMORPHONE HYDROCHLORIDE 0.5 MG: 2 INJECTION, SOLUTION INTRAMUSCULAR; INTRAVENOUS; SUBCUTANEOUS at 16:35

## 2018-02-08 RX ADMIN — GLYCOPYRROLATE 0.6 MG: 0.2 INJECTION INTRAMUSCULAR; INTRAVENOUS at 15:23

## 2018-02-08 RX ADMIN — HYDROMORPHONE HYDROCHLORIDE 0.5 MG: 1 INJECTION, SOLUTION INTRAMUSCULAR; INTRAVENOUS; SUBCUTANEOUS at 16:09

## 2018-02-08 RX ADMIN — FAMOTIDINE 20 MG: 20 TABLET, FILM COATED ORAL at 12:21

## 2018-02-08 RX ADMIN — HYDROMORPHONE HYDROCHLORIDE 1 MG: 1 INJECTION, SOLUTION INTRAMUSCULAR; INTRAVENOUS; SUBCUTANEOUS at 15:24

## 2018-02-08 RX ADMIN — HYDROMORPHONE HYDROCHLORIDE 0.5 MG: 2 INJECTION, SOLUTION INTRAMUSCULAR; INTRAVENOUS; SUBCUTANEOUS at 16:17

## 2018-02-08 RX ADMIN — CEFAZOLIN SODIUM 2 G: 2 SOLUTION INTRAVENOUS at 14:16

## 2018-02-08 RX ADMIN — LIDOCAINE HYDROCHLORIDE 100 MG: 20 INJECTION, SOLUTION EPIDURAL; INFILTRATION; INTRACAUDAL; PERINEURAL at 14:24

## 2018-02-08 RX ADMIN — ROCURONIUM BROMIDE 20 MG: 10 INJECTION, SOLUTION INTRAVENOUS at 14:30

## 2018-02-08 NOTE — IP AVS SNAPSHOT
Tati Lucas 
 
 
 [address] [facility] [address] [place] [address] Patient: Johnie Tellez MRN: [id number] :1970 About your hospitalization You were admitted on:  2018 You last received care in the:  [facility] You were discharged on:  2018 Why you were hospitalized Your primary diagnosis was:  Not on File Your diagnoses also included:  Epidural Lipomatosis, Cauda Equina Compression (Hcc) Follow-up Information Follow up With Details Comments Contact Ana Maya MD On 2018 Appointment at 10:30 am [address] [facility] [address] 
[phone] Jason Lopez NP On 2018 Appointment at 10:40 am [address] [facility] 
[phone] Your Scheduled Appointments 2018 10:30 AM EST TRANSITIONAL CARE MANAGEMENT with Joy Beth NP  
[facility] INTERNAL MEDICINE [facility] (Emmalene Seay) [address] [facility] 6 [facility] [address]  
[phone]  10:40 AM EST  
POST OP with Jason Lopez NP  
[facility] (Emmalene Seay) [address] [facility] [address]  
[phone] 2018  9:15 AM EDT Follow Up with Joy Beth NP  
[facility] INTERNAL [facility] (Emmalene Seay) 340 [facility], [facility] 6 [facility] [address]  
[phone] 2018 10:15 AM EDT  
POST OP with Adam Sandhu MD  
[address] [place] [address], [address] [facility] Emmalene Seay) [address] [facility] [id number] [phone] Discharge Orders None A check vladislav indicates which time of day the medication should be taken. My Medications START taking these medications Instructions Each Dose to Equal  
 Morning Noon Evening Bedtime  
 oxyCODONE-acetaminophen  mg per tablet Commonly known as:  PERCOCET Your last dose was: Your next dose is: Take 1 Tab by mouth every six (6) hours as needed for Pain. Max Daily Amount: 4 Tabs. 1 Tab CONTINUE taking these medications Instructions Each Dose to Equal  
 Morning Noon Evening Bedtime  
 aspirin delayed-release 81 mg tablet Your last dose was: Your next dose is: Take  by mouth daily. B COMPLEX 1 tablet Generic drug:  b complex vitamins Your last dose was: Your next dose is: Take 1 Tab by mouth daily. 1 Tab  
    
   
   
   
  
 gabapentin 600 mg tablet Commonly known as:  NEURONTIN Your last dose was: Your next dose is: Take 1 Tab by mouth three (3) times daily. 600 mg IBUPROFEN PO Your last dose was: Your next dose is: Take  by mouth as needed. labetalol 100 mg tablet Commonly known as:  Gayl Carl Your last dose was: Your next dose is: TAKE ONE TABLET BY MOUTH TWICE DAILY  
     
   
   
   
  
 metFORMIN 500 mg tablet Commonly known as:  GLUCOPHAGE Your last dose was: Your next dose is: Take 1 Tab by mouth two (2) times daily (with meals). 500 mg  
    
   
   
   
  
 * traMADol 50 mg tablet Commonly known as:  ULTRAM  
   
Your last dose was: Your next dose is: Take 1 Tab by mouth every six (6) hours as needed for Pain. Max Daily Amount: 200 mg.  
 50 mg  
    
   
   
   
  
 * traMADol 50 mg tablet Commonly known as:  ULTRAM  
   
Your last dose was: Your next dose is: Take 1 Tab by mouth every six (6) hours as needed for Pain.  Max Daily Amount: 200 mg.  
 50 mg  
    
   
 valsartan-hydroCHLOROthiazide 80-12.5 mg per tablet Commonly known as:  DIOVAN-HCT Your last dose was: Your next dose is: TAKE ONE TABLET BY MOUTH ONCE DAILY * Notice: This list has 2 medication(s) that are the same as other medications prescribed for you. Read the directions carefully, and ask your doctor or other care provider to review them with you. Where to Get Your Medications Information on where to get these meds will be given to you by the nurse or doctor. ! Ask your nurse or doctor about these medications  
  oxyCODONE-acetaminophen  mg per tablet Discharge Instructions DISCHARGE SUMMARY from Nurse PATIENT INSTRUCTIONS: 
 
After general anesthesia or intravenous sedation, for 24 hours or while taking prescription Narcotics: · Limit your activities · Do not drive and operate hazardous machinery · Do not make important personal or business decisions · Do  not drink alcoholic beverages · If you have not urinated within 8 hours after discharge, please contact your surgeon on call. Report the following to your surgeon: 
· Excessive pain, swelling, redness or odor of or around the surgical area · Temperature over 100.5 · Nausea and vomiting lasting longer than 4 hours or if unable to take medications · Any signs of decreased circulation or nerve impairment to extremity: change in color, persistent  numbness, tingling, coldness or increase pain · Any questions What to do at Home: 
Recommended activity: PT/OT per Home Health,  
 
If you experience any of the following symptoms , please follow up with . *  Please give a list of your current medications to your Primary Care Provider. *  Please update this list whenever your medications are discontinued, doses are 
    changed, or new medications (including over-the-counter products) are added. *  Please carry medication information at all times in case of emergency situations. These are general instructions for a healthy lifestyle: No smoking/ No tobacco products/ Avoid exposure to second hand smoke Surgeon General's Warning:  Quitting smoking now greatly reduces serious risk to your health. Obesity, smoking, and sedentary lifestyle greatly increases your risk for illness A healthy diet, regular physical exercise & weight monitoring are important for maintaining a healthy lifestyle You may be retaining fluid if you have a history of heart failure or if you experience any of the following symptoms:  Weight gain of 3 pounds or more overnight or 5 pounds in a week, increased swelling in our hands or feet or shortness of breath while lying flat in bed. Please call your doctor as soon as you notice any of these symptoms; do not wait until your next office visit. Recognize signs and symptoms of STROKE: 
 
F-face looks uneven A-arms unable to move or move unevenly S-speech slurred or non-existent T-time-call 911 as soon as signs and symptoms begin-DO NOT go Back to bed or wait to see if you get better-TIME IS BRAIN. Warning Signs of HEART ATTACK Call 911 if you have these symptoms: 
? Chest discomfort. Most heart attacks involve discomfort in the center of the chest that lasts more than a few minutes, or that goes away and comes back. It can feel like uncomfortable pressure, squeezing, fullness, or pain. ? Discomfort in other areas of the upper body. Symptoms can include pain or discomfort in one or both arms, the back, neck, jaw, or stomach. ? Shortness of breath with or without chest discomfort. ? Other signs may include breaking out in a cold sweat, nausea, or lightheadedness. Don't wait more than five minutes to call [address]! Fast action can save your life.  Calling 911 is almost always the fastest way to get lifesaving treatment. Emergency Medical Services staff can begin treatment when they arrive  up to an hour sooner than if someone gets to the hospital by car. The discharge information has been reviewed with the patient. The patient verbalized understanding. Discharge medications reviewed with the patient and appropriate educational materials and side effects teaching were provided. ___________________________________________________________________________________________________________________________________ Introducing [address]! Jimy Barreto introduces [email or web address] patient portal. Now you can access parts of your medical record, email your doctor's office, and request medication refills online. 1. In your internet browser, go to [email or web address] 2. Click on the First Time User? Click Here link in the Sign In box. You will see the New Member Sign Up page. 3. Enter your [email or web address] Access Code exactly as it appears below. You will not need to use this code after youve completed the sign-up process. If you do not sign up before the expiration date, you must request a new code. · [email or web address] Access Code: [email or web address] Expires: 5/8/2018 12:37 PM 
 
4. Enter the last four digits of your Social Security Number (xxxx) and Date of Birth (mm/dd/yyyy) as indicated and click Submit. You will be taken to the next sign-up page. 5. Create a [email or web address] ID. This will be your [email or web address] login ID and cannot be changed, so think of one that is secure and easy to remember. 6. Create a [email or web address] password. You can change your password at any time. 7. Enter your Password Reset Question and Answer. This can be used at a later time if you forget your password. 8. Enter your e-mail address. You will receive e-mail notification when new information is available in [address] 9. Click Sign Up. You can now view and download portions of your medical record. 10. Click the Download Summary menu link to download a portable copy of your medical information. If you have questions, please visit the Frequently Asked Questions section of the [employer] website. Remember, [employer] is NOT to be used for urgent needs. For medical emergencies, dial 911. Now available from your iPhone and Android! Unresulted Labs-Please follow up with your PCP about these lab tests Order Current Status NC XR TECHNOLOGIST SERVICE In process Providers Seen During Your Hospitalization Provider Specialty Primary office phone Jacob Borrero MD Orthopedic Surgery [phone] Your Primary Care Physician (PCP) Primary Care Physician Office Phone Office Fax [address], [address] [place] [address] [address] [phone] You are allergic to the following Allergen Reactions Iodine Unknown (comments) Recent Documentation Height Weight BMI Smoking Status 1.651 m 101.1 kg 37.08 kg/m2 Never Smoker Emergency Contacts Name Discharge Info Relation Home Work Mobile YooDominick DISCHARGE CAREGIVER [3] Father [15] [phone] Patient Belongings The following personal items are in your possession at time of discharge: 
  Dental Appliances: None  Visual Aid: None      Home Medications: None   Jewelry: None  Clothing: None    Other Valuables: Cell Phone  Personal Items Sent to Safe: n/a Please provide this summary of care documentation to your next provider. Signatures-by signing, you are acknowledging that this After Visit Summary has been reviewed with you and you have received a copy. Patient Signature:  ____________________________________________________________ Date:  ____________________________________________________________  
  
[facility] Provider Signature:  ____________________________________________________________ Date:  ____________________________________________________________

## 2018-02-08 NOTE — OP NOTES
[facility]  OPERATIVE REPORT    Name:SHADE NUÑEZ  MR#: [id number]  : 1970  ACCOUNT #: [de-identified]   DATE OF SERVICE: 2018    PREOPERATIVE DIAGNOSIS:  Spinal stenosis, cauda equina syndrome, epidural lipomatosis. POSTOPERATIVE DIAGNOSIS:  Spinal stenosis, cauda equina syndrome, epidural lipomatosis. PROCEDURE:   L4 laminectomy, L5 laminectomy. SURGEON:  Horacio Cintron MD    ASSISTANT:      ANESTHESIA: Endotracheal anesthesia. ESTIMATED BLOOD LOSS:  50 to 100 mL. SPECIMENS REMOVED: None. COMPLICATIONS:  None. IMPLANTS:      FINDINGS:  The patient had fatty overgrowth consistent with epidural lipomatosis. DESCRIPTION OF PROCEDURE:   Following induction of endotracheal anesthesia with the patient was turned in the prone position on the spinal frame. Patient was prepped and draped in the usual fashion. A midline incision was made. A paramedian incision in the lumbodorsal fashion. Subperiosteal dissection at L4 and L5. C-arm images verified our surgical level and as taken. The lamina was thinned with rongeurs. A laminectomy was done at L4 and L5. The ligamentum flavum was resected. Underlying the ligamentum flavum, there was hypertrophic epidural fat with epidural lipomatosis. This was resected by teasing it free and was suctioned decompressing the neural elements. Care was taken to maintain facet integrity to limit the chance of any postoperative instability. With [facility] elevators and suction, fat was teased free and decompression was done with direct visualization of the dura. The wound was copiously irrigated. There was bleeding from the muscle surfaces. A deep drain was placed. Gelfoam was placed over the laminotomy site. The lumbodorsal fascia was closed with #1 Vicryl. The subcutaneous tissues were closed with 2-0 Vicryl and the skin was closed with 4-0 Monocryl, subcuticular suture and Dermabond.   A silk dressing was placed upon the wound. All counts were correct. ESTIMATED BLOOD LOSS:   mL. No complications, no specimens.       Carlton Joe MD       [address] / Alen Mendoza  D: 02/08/2018 15:27     T: 02/08/2018 18:40  JOB #: [id number]

## 2018-02-08 NOTE — BRIEF OP NOTE
BRIEF OPERATIVE NOTE    Date of Procedure: 2/8/2018   Preoperative Diagnosis: EPIDURAL LIPOMATOSIS/CAUDA EQUINE D17.779, G83.4  Postoperative Diagnosis: EPIDURAL LIPOMATOSIS/CAUDA EQUINE D17.779, G83.4    Procedure(s):  L4/5 L5/S1 LAMINECTOMY  Surgeon(s) and Role:     * Shahzad Cee MD - Primary         Assistant Staff: None      Surgical Staff:  Circ-1: Pamela Victor RN  Radiology Technician: Maryann Carreno  Scrub Tech-1: Daniel Flores  Surg Asst-1: Lane Vital  Event Time In   Incision Start 026 848 14 90   Incision Close      Anesthesia: General   Estimated Blood Loss: 50  Specimens: * No specimens in log *   Findings: epidural lipomatosis   Complications: 0  Implants: * No implants in log *

## 2018-02-08 NOTE — IP AVS SNAPSHOT
Summary of Care Report The Summary of Care report has been created to help improve care coordination. Users with access to [employer] or [address] (Web-based application) may access additional patient information including the Discharge Summary. If you are not currently a [address] user and need more information, please call the number listed below in the [place] [address] section and ask to be connected with Medical Records. Facility Information Name Address Phone [address] [facility] [address] [facility] [address] [phone] Patient Information Patient Name Sex DOB Saintclair Beer ([id number]) Male 1970 Discharge Information Admitting Provider Service Area Unit Connie Das MD / Wilian [address] / [phone] Discharge Provider Discharge Date/Time Discharge Disposition Destination (none) 2018 (Pending) AHR (none) Patient Language Language ENGLISH [13] Hospital Problems as of 2018  Reviewed: 2018 12:11 PM by Gracy Haque NP Class Noted - Resolved Last Modified POA Active Problems Epidural lipomatosis  2018 - Present 2018 by Kristen Rodriguez, NP Unknown Entered by Brittney Arellano Cauda equina compression ([address]  2018 - Present 2018 by Kristen Rodriguez, NP Unknown Entered by Kristen Rodriguez NP Non-Hospital Problems as of 2018  Reviewed: 2018 12:11 PM by Gracy Haque NP Class Noted - Resolved Last Modified Active Problems Pulmonary hypertension  2012 - Present 2012 by Jennifer Napier Entered by Jennifer Napier Skin lesion  Unknown - Present 2015 by Alen Bryson Entered by Alen Bryson Bursitis disorder  Unknown - Present 2015 by Alen Bryson Entered by Sherwin Hudson Umbilical hernia without obstruction or gangrene  Unknown - Present 12/7/2015 by Sherwin Hudson Entered by Sherwin Hudson Reflux  1/28/2016 - Present 1/28/2016 by Frances Sauceda MD  
  Entered by Frances Sauceda MD  
  Gastroesophageal reflux disease without esophagitis  2/7/2016 - Present 2/7/2016 by Frances Sauceda MD  
  Entered by Frances Sauceda MD  
  Palpitations  2/7/2016 - Present 2/7/2016 by Frances Sauceda MD  
  Entered by Frances Sauceda MD  
  Right lower quadrant abdominal pain  7/18/2016 - Present 7/18/2016 by Frances Sauceda MD  
  Entered by Frances Sauceda MD  
  Essential hypertension  12/12/2016 - Present 12/12/2016 by Tramaine Galvez NP Entered by Tramaine Galvez NP  
  LBBB (left bundle branch block)  12/28/2016 - Present 12/28/2016 by General Lucy MD  
  Entered by General Lucy MD  
  Spinal stenosis, lumbar region with neurogenic claudication  1/3/2018 - Present 1/3/2018 by Jessi Specking Entered by Jessi Speckcyndy Lumbar neuritis  1/3/2018 - Present 1/3/2018 by Jessi Specking Entered by Jessi Speckcyndy DDD (degenerative disc disease), lumbar  1/3/2018 - Present 1/3/2018 by Jessi Specking Entered by Jessi Speckcyndy Lumbar radiculopathy  2/2/2018 - Present 2/2/2018 by Gerry Carl Entered by Gerry Carl You are allergic to the following Allergen Reactions Iodine Unknown (comments) Current Discharge Medication List  
  
START taking these medications Dose & Instructions Dispensing Information Comments  
 oxyCODONE-acetaminophen  mg per tablet Commonly known as:  PERCOCET Dose:  1 Tab Take 1 Tab by mouth every six (6) hours as needed for Pain. Max Daily Amount: 4 Tabs. Quantity:  30 Tab Refills:  0 CONTINUE these medications which have NOT CHANGED Dose & Instructions Dispensing Information Comments  
 aspirin delayed-release 81 mg tablet Take  by mouth daily. Refills:  0  
   
 B COMPLEX 1 tablet Generic drug:  b complex vitamins Dose:  1 Tab Take 1 Tab by mouth daily. Refills:  0  
   
 gabapentin 600 mg tablet Commonly known as:  NEURONTIN Dose:  600 mg Take 1 Tab by mouth three (3) times daily. Quantity:  90 Tab Refills:  1 IBUPROFEN PO Take  by mouth as needed. Refills:  0  
   
 labetalol 100 mg tablet Commonly known as:  NORMODYNE  
 TAKE ONE TABLET BY MOUTH TWICE DAILY Quantity:  60 Tab Refills:  2 Please consider 90 day supplies to promote better adherence  
  
 metFORMIN 500 mg tablet Commonly known as:  GLUCOPHAGE Dose:  500 mg Take 1 Tab by mouth two (2) times daily (with meals). Quantity:  60 Tab Refills:  1  
   
 * traMADol 50 mg tablet Commonly known as:  ULTRAM  
 Dose:  50 mg Take 1 Tab by mouth every six (6) hours as needed for Pain. Max Daily Amount: 200 mg. Quantity:  12 Tab Refills:  0  
   
 * traMADol 50 mg tablet Commonly known as:  ULTRAM  
 Dose:  50 mg Take 1 Tab by mouth every six (6) hours as needed for Pain. Max Daily Amount: 200 mg. Quantity:  2 Tab Refills:  0  
   
 valsartan-hydroCHLOROthiazide 80-12.5 mg per tablet Commonly known as:  DIOVAN-HCT  
 TAKE ONE TABLET BY MOUTH ONCE DAILY Quantity:  30 Tab Refills:  1 Please consider 90 day supplies to promote better adherence * Notice: This list has 2 medication(s) that are the same as other medications prescribed for you. Read the directions carefully, and ask your doctor or other care provider to review them with you. Surgery Information ID Date/Time Status Primary Surgeon All Procedures Location [id number] 2/8/2018 [address] Rose Mary Gallardo MD L4/5 L5/S1 LAMINECTOMY [facility] MAIN OR Follow-up Information Follow up With Details Comments Contact Info Germania Cerrato MD On 2/16/2018 Appointment at 10:30 am [address] [facility] [address] [phone] Nieves Arcos NP On 2/22/2018 Appointment at 10:40 am [address] [place] [address] 
[phone] Discharge Instructions DISCHARGE SUMMARY from Nurse PATIENT INSTRUCTIONS: 
 
After general anesthesia or intravenous sedation, for 24 hours or while taking prescription Narcotics: · Limit your activities · Do not drive and operate hazardous machinery · Do not make important personal or business decisions · Do  not drink alcoholic beverages · If you have not urinated within 8 hours after discharge, please contact your surgeon on call. Report the following to your surgeon: 
· Excessive pain, swelling, redness or odor of or around the surgical area · Temperature over 100.5 · Nausea and vomiting lasting longer than 4 hours or if unable to take medications · Any signs of decreased circulation or nerve impairment to extremity: change in color, persistent  numbness, tingling, coldness or increase pain · Any questions What to do at Home: 
Recommended activity: PT/OT per Home Health,  
 
If you experience any of the following symptoms , please follow up with . *  Please give a list of your current medications to your Primary Care Provider. *  Please update this list whenever your medications are discontinued, doses are 
    changed, or new medications (including over-the-counter products) are added. *  Please carry medication information at all times in case of emergency situations. These are general instructions for a healthy lifestyle: No smoking/ No tobacco products/ Avoid exposure to second hand smoke Surgeon General's Warning:  Quitting smoking now greatly reduces serious risk to your health. Obesity, smoking, and sedentary lifestyle greatly increases your risk for illness A healthy diet, regular physical exercise & weight monitoring are important for maintaining a healthy lifestyle You may be retaining fluid if you have a history of heart failure or if you experience any of the following symptoms:  Weight gain of 3 pounds or more overnight or 5 pounds in a week, increased swelling in our hands or feet or shortness of breath while lying flat in bed. Please call your doctor as soon as you notice any of these symptoms; do not wait until your next office visit. Recognize signs and symptoms of STROKE: 
 
F-face looks uneven A-arms unable to move or move unevenly S-speech slurred or non-existent T-time-call 911 as soon as signs and symptoms begin-DO NOT go Back to bed or wait to see if you get better-TIME IS BRAIN. Warning Signs of HEART ATTACK Call 911 if you have these symptoms: 
? Chest discomfort. Most heart attacks involve discomfort in the center of the chest that lasts more than a few minutes, or that goes away and comes back. It can feel like uncomfortable pressure, squeezing, fullness, or pain. ? Discomfort in other areas of the upper body. Symptoms can include pain or discomfort in one or both arms, the back, neck, jaw, or stomach. ? Shortness of breath with or without chest discomfort. ? Other signs may include breaking out in a cold sweat, nausea, or lightheadedness. Don't wait more than five minutes to call [address]! Fast action can save your life. Calling 911 is almost always the fastest way to get lifesaving treatment. Emergency Medical Services staff can begin treatment when they arrive  up to an hour sooner than if someone gets to the hospital by car. The discharge information has been reviewed with the patient. The patient verbalized understanding. Discharge medications reviewed with the patient and appropriate educational materials and side effects teaching were provided. ___________________________________________________________________________________________________________________________________ Chart Review Routing History Recipient Method Report Sent By Alec Perales MD  
Phone: [phone] In [facility] EKG CUSTOM REPORT Nida Santiago [[id number]] 6/29/2012 12:00 PM 06/29/2012 Armando Gifford RN Phone: [phone] In [facility] IP AMB RESULT REPORT [facility] [[id number]] 7/19/2012 10:21 AM 07/09/2012 Taz Aguirre RN Phone: [phone] In [facility] IP AMB RESULT REPORT [facility] [[id number]] 7/19/2012 10:21 AM 07/09/2012 Keren Ramos MD  
Fax: [phone] Phone: [phone] Fax IP Auto Routed MD Yon [[id number]] 9/21/2012 11:35 AM 09/21/2012

## 2018-02-08 NOTE — ANESTHESIA POSTPROCEDURE EVALUATION
Post-Anesthesia Evaluation and Assessment    Patient: Monique Bush MRN: [id number]  SSN: [id number]    YOB: 1970  Age: 56 y.o. Sex: male     VS from flow sheet    Cardiovascular Function/Vital Signs  Visit Vitals    /78    Pulse 80    Temp 36.4 °C (97.6 °F)    Resp 12    Ht 5' 5\" (1.651 m)    Wt 101.1 kg (222 lb 12.8 oz)    SpO2 97%    BMI 37.08 kg/m2       Patient is status post general anesthesia for Procedure(s):  L4/5 L5/S1 LAMINECTOMY. Nausea/Vomiting: None    Postoperative hydration reviewed and adequate. Pain:  Pain Scale 1: Numeric (0 - 10) (02/08/18 1648)  Pain Intensity 1: 3 (02/08/18 1648)   Managed    Neurological Status:   Neuro (WDL): Within Defined Limits (02/08/18 1553)   At baseline    Mental Status and Level of Consciousness: Arousable    Pulmonary Status:   O2 Device: Nasal cannula (02/08/18 1600)   Adequate oxygenation and airway patent    Complications related to anesthesia: None    Post-anesthesia assessment completed.  No concerns    Signed By: Iliana Bonilla MD     February 8, 2018

## 2018-02-08 NOTE — INTERVAL H&P NOTE
H&P Update: Ava De La Rosa was seen and examined. History and physical has been reviewed. The patient has been examined.  There have been no significant clinical changes since the completion of the originally dated History and Physical.    Signed By: Rizwana Flowers MD     February 8, 2018 1:26 PM

## 2018-02-08 NOTE — ANESTHESIA PREPROCEDURE EVALUATION
Anesthetic History   No history of anesthetic complications            Review of Systems / Medical History  Patient summary reviewed, nursing notes reviewed and pertinent labs reviewed    Pulmonary        Sleep apnea: No treatment           Neuro/Psych   Within defined limits           Cardiovascular    Hypertension: poorly controlled              Exercise tolerance: >4 METS     GI/Hepatic/Renal                Endo/Other    Diabetes: poorly controlled, type 2    Morbid obesity     Other Findings   Comments: Documentation of current medication  Current medications obtained, documented and obtained? YES      Risk Factors for Postoperative nausea/vomiting:       History of postoperative nausea/vomiting? NO       Female? NO       Motion sickness? NO       Intended opioid administration for postoperative analgesia? YES      Smoking Abstinence:  Current Smoker? NO  Elective Surgery? YES  Seen preoperatively by anesthesiologist or proxy prior to day of surgery? YES  Pt abstained from smoking 24 hours prior to anesthesia?  N/A    Preventive care/screening for High Blood Pressure:  Aged 18 years and older: YES  Screened for high blood pressure: YES  Patients with high blood pressure referred to primary care provider   for BP management: YES                 Physical Exam    Airway  Mallampati: III  TM Distance: 4 - 6 cm  Neck ROM: normal range of motion   Mouth opening: Normal     Cardiovascular  Regular rate and rhythm,  S1 and S2 normal,  no murmur, click, rub, or gallop  Rhythm: regular  Rate: normal         Dental  No notable dental hx       Pulmonary  Breath sounds clear to auscultation               Abdominal  GI exam deferred       Other Findings            Anesthetic Plan    ASA: 3  Anesthesia type: general          Induction: Intravenous  Anesthetic plan and risks discussed with: Patient

## 2018-02-08 NOTE — IP AVS SNAPSHOT
Rajani Lynn 
 
 
 [address] Patient: Earle Ferrer MRN: [id number] :1970 A check vladislav indicates which time of day the medication should be taken. My Medications START taking these medications Instructions Each Dose to Equal  
 Morning Noon Evening Bedtime  
 oxyCODONE-acetaminophen  mg per tablet Commonly known as:  PERCOCET Your last dose was: Your next dose is: Take 1 Tab by mouth every six (6) hours as needed for Pain. Max Daily Amount: 4 Tabs. 1 Tab CONTINUE taking these medications Instructions Each Dose to Equal  
 Morning Noon Evening Bedtime  
 aspirin delayed-release 81 mg tablet Your last dose was: Your next dose is: Take  by mouth daily. B COMPLEX 1 tablet Generic drug:  b complex vitamins Your last dose was: Your next dose is: Take 1 Tab by mouth daily. 1 Tab  
    
   
   
   
  
 gabapentin 600 mg tablet Commonly known as:  NEURONTIN Your last dose was: Your next dose is: Take 1 Tab by mouth three (3) times daily. 600 mg IBUPROFEN PO Your last dose was: Your next dose is: Take  by mouth as needed. labetalol 100 mg tablet Commonly known as:  Dorla Bayamon Your last dose was: Your next dose is: TAKE ONE TABLET BY MOUTH TWICE DAILY  
     
   
   
   
  
 metFORMIN 500 mg tablet Commonly known as:  GLUCOPHAGE Your last dose was: Your next dose is: Take 1 Tab by mouth two (2) times daily (with meals). 500 mg  
    
   
   
   
  
 * traMADol 50 mg tablet Commonly known as:  ULTRAM  
   
Your last dose was: Your next dose is: Take 1 Tab by mouth every six (6) hours as needed for Pain. Max Daily Amount: 200 mg.  
 50 mg  
    
   
   
   
  
 * traMADol 50 mg tablet Commonly known as:  ULTRAM  
   
Your last dose was: Your next dose is: Take 1 Tab by mouth every six (6) hours as needed for Pain. Max Daily Amount: 200 mg.  
 50 mg  
    
   
   
   
  
 valsartan-hydroCHLOROthiazide 80-12.5 mg per tablet Commonly known as:  DIOVAN-HCT Your last dose was: Your next dose is: TAKE ONE TABLET BY MOUTH ONCE DAILY * Notice: This list has 2 medication(s) that are the same as other medications prescribed for you. Read the directions carefully, and ask your doctor or other care provider to review them with you. Where to Get Your Medications Information on where to get these meds will be given to you by the nurse or doctor. ! Ask your nurse or doctor about these medications  
  oxyCODONE-acetaminophen  mg per tablet

## 2018-02-08 NOTE — PERIOP NOTES
TRANSFER - OUT REPORT:    Verbal report given to Mally West RN on [employer]  being transferred to  for routine post - op       Report consisted of patients Situation, Background, Assessment and   Recommendations(SBAR). Information from the following report(s) SBAR and MAR was reviewed with the receiving nurse. Lines:   Peripheral IV 02/08/18 Left Wrist (Active)   Site Assessment Clean, dry, & intact 2/8/2018  3:50 PM   Phlebitis Assessment 0 2/8/2018  3:50 PM   Infiltration Assessment 0 2/8/2018  3:50 PM   Dressing Status Clean, dry, & intact 2/8/2018  3:50 PM   Dressing Type Tape;Transparent 2/8/2018  3:50 PM   Hub Color/Line Status Pink; Infusing 2/8/2018  3:50 PM        Opportunity for questions and clarification was provided.       Patient transported with:   O2 @ 2 liters

## 2018-02-08 NOTE — PROGRESS NOTES
Date of Surgery: OR today  Surgery: L 4/5 L5/S1 lami  VSS  Wound: Dressing clean, dry and intact  MARIA R: to suction, 15 mL out in PACU  PT: to see patient once on the floor   Swallowing ok. Neuro intact. Moving all extremities. 4/5 strength to BLE. Pre op pain: back and BLE pain  Post op pain: patient seen in PACU, remains drowsy. Unable to determine if the leg and back pain has improved. Ice pack to back.      Ezio William, NP

## 2018-02-09 ENCOUNTER — HOME HEALTH ADMISSION (OUTPATIENT)
Dept: HOME HEALTH SERVICES | Facility: HOME HEALTH | Age: 48
End: 2018-02-09
Payer: COMMERCIAL

## 2018-02-09 ENCOUNTER — TELEPHONE (OUTPATIENT)
Dept: ORTHOPEDIC SURGERY | Age: 48
End: 2018-02-09

## 2018-02-09 VITALS
HEIGHT: 65 IN | HEART RATE: 97 BPM | WEIGHT: 222.8 LBS | BODY MASS INDEX: 37.12 KG/M2 | SYSTOLIC BLOOD PRESSURE: 117 MMHG | OXYGEN SATURATION: 96 % | TEMPERATURE: 99 F | RESPIRATION RATE: 18 BRPM | DIASTOLIC BLOOD PRESSURE: 80 MMHG

## 2018-02-09 LAB — GLUCOSE BLD STRIP.AUTO-MCNC: 149 MG/DL (ref 70–110)

## 2018-02-09 PROCEDURE — 97161 PT EVAL LOW COMPLEX 20 MIN: CPT

## 2018-02-09 PROCEDURE — 74011636637 HC RX REV CODE- 636/637: Performed by: NURSE PRACTITIONER

## 2018-02-09 PROCEDURE — 99218 HC RM OBSERVATION: CPT

## 2018-02-09 PROCEDURE — 74011250637 HC RX REV CODE- 250/637: Performed by: ORTHOPAEDIC SURGERY

## 2018-02-09 PROCEDURE — 97535 SELF CARE MNGMENT TRAINING: CPT

## 2018-02-09 PROCEDURE — 74011250636 HC RX REV CODE- 250/636: Performed by: ORTHOPAEDIC SURGERY

## 2018-02-09 PROCEDURE — 97116 GAIT TRAINING THERAPY: CPT

## 2018-02-09 PROCEDURE — 97165 OT EVAL LOW COMPLEX 30 MIN: CPT

## 2018-02-09 PROCEDURE — 82962 GLUCOSE BLOOD TEST: CPT

## 2018-02-09 RX ORDER — OXYCODONE AND ACETAMINOPHEN 10; 325 MG/1; MG/1
1 TABLET ORAL
Qty: 30 TAB | Refills: 0 | Status: SHIPPED | OUTPATIENT
Start: 2018-02-09 | End: 2018-05-15

## 2018-02-09 RX ORDER — POLYETHYLENE GLYCOL 3350 17 G/17G
17 POWDER, FOR SOLUTION ORAL DAILY
Status: DISCONTINUED | OUTPATIENT
Start: 2018-02-09 | End: 2018-02-09 | Stop reason: HOSPADM

## 2018-02-09 RX ORDER — METHYLPREDNISOLONE 4 MG/1
TABLET ORAL
Qty: 1 DOSE PACK | Refills: 0 | Status: SHIPPED | OUTPATIENT
Start: 2018-02-09 | End: 2018-02-22

## 2018-02-09 RX ADMIN — LOSARTAN POTASSIUM AND HYDROCHLOROTHIAZIDE 1 TABLET: 25; 100 TABLET ORAL at 09:03

## 2018-02-09 RX ADMIN — POLYETHYLENE GLYCOL 3350 17 G: 17 POWDER, FOR SOLUTION ORAL at 09:02

## 2018-02-09 RX ADMIN — Medication 10 ML: at 00:38

## 2018-02-09 RX ADMIN — Medication 10 ML: at 05:50

## 2018-02-09 RX ADMIN — HYDROMORPHONE HYDROCHLORIDE 1 MG: 1 INJECTION, SOLUTION INTRAMUSCULAR; INTRAVENOUS; SUBCUTANEOUS at 00:54

## 2018-02-09 RX ADMIN — HYDROMORPHONE HYDROCHLORIDE 1 MG: 1 INJECTION, SOLUTION INTRAMUSCULAR; INTRAVENOUS; SUBCUTANEOUS at 05:49

## 2018-02-09 RX ADMIN — INSULIN LISPRO 2 UNITS: 100 INJECTION, SOLUTION INTRAVENOUS; SUBCUTANEOUS at 00:36

## 2018-02-09 RX ADMIN — DIAZEPAM 5 MG: 5 TABLET ORAL at 09:16

## 2018-02-09 RX ADMIN — CEFAZOLIN SODIUM 2 G: 2 SOLUTION INTRAVENOUS at 05:49

## 2018-02-09 RX ADMIN — LABETALOL HYDROCHLORIDE 100 MG: 100 TABLET, FILM COATED ORAL at 09:02

## 2018-02-09 RX ADMIN — GABAPENTIN 300 MG: 300 CAPSULE ORAL at 09:02

## 2018-02-09 NOTE — PROGRESS NOTES
vss afeb  Neuro intact  Accidentally pulled shane out last night  Voiding ambulating  Feels constipated  Past small amount of gas- possibly mild ileus  Pt  Dc home  Fu 2 weeks

## 2018-02-09 NOTE — PROGRESS NOTES
Problem: Mobility Impaired (Adult and Pediatric)  Goal: *Acute Goals and Plan of Care (Insert Text)  Outcome: Resolved/Met Date Met: 02/09/18  physical Therapy EVALUATION & Discharge    Patient: Barry Jc (10 y.o. male)  Date: 2/9/2018  Primary Diagnosis: EPIDURAL LIPOMATOSIS/CAUDA EQUINE D17.779, G83.4  Epidural lipomatosis  Cauda equina compression (HCC)  Procedure(s) (LRB):  L4/5 L5/S1 LAMINECTOMY (N/A) 1 Day Post-Op   Precautions: Fall       ASSESSMENT AND RECOMMENDATIONS:  Patient is 57 yo F admitted to hospital for L4, L5, S1 laminectomy and presents today alert and agreeable to therapy and ws sitting in locked recliner upon arrival. Patient was educated on spinal precautions and given several functional examples throughout session as to tasks within and outside of compliance with precautions. Patient performed seated objective assessment and was given demo with instruction on sit <> stand transfer and gait training and transferred to standing with Modified independence and ambulated 200ft with RW and modified independen with good carryover of cues to keep walker close to Lino and square to shoulders when transferring sit <> stand and during functional tasks. Patient demonstrated good compliance with precautions throughout session. At conclusion of gait training, performed log roll in and OOB with Sarah. At conclusion of session patient transferred to sitting in recliner and was left resting with call bell by the side and SCDs donned. Patient reports that he will either have assistance to transfer form sidelying to sit or he is able to sleep in recliner at home until home health therapy addresses log rolling. Patient instructed to call for assistance if they needed to get up for any reason and denied need for further assistance. Patient is functioning at modified independent level and skilled physical therapy is not indicated at this time. Patient agreeable to D/C from PT at this level of care.   Discharge Recommendations: Home Health  Further Equipment Recommendations for Discharge: rolling walker      G-:CODES     Mobility  Current  CI= 1-19%   Goal  CI= 1-19%  D/C  CI= 1-19%. The severity rating is based on the Level of Assistance required for Functional Mobility and ADLs. Evaluation Complexity     Eval Complexity: History: MEDIUM  Complexity : 1-2 comorbidities / personal factors will impact the outcome/ POC Exam:LOW Complexity : 1-2 Standardized tests and measures addressing body structure, function, activity limitation and / or participation in recreation  Presentation: LOW Complexity : Stable, uncomplicated  Clinical Decision Making:Low Complexity   Overall Complexity:LOW     SUBJECTIVE:   Patient stated I have trouble getting clean after going to the bathroom unless I twist. Patient deferred to OT for toileting needs with spinal preacutions and PT notified OT of patient question regarding toileting. OBJECTIVE DATA SUMMARY:     Past Medical History:   Diagnosis Date    Acute sinusitis     BBB (bundle branch block)     Bursitis disorder     Cardiac cath 07/30/2012    R dom. Patent coronary arteries. LVEDP 15.  RA 10.  RV 32/10. PA 32/18. Mean PASP 22. W 14.  CO 5.7 (TD). High CO & high O2 sats on right raise question of peripheral arterial venous shunting.  Cardiac echocardiogram 06/27/2012    EF 50-55%. Dyssynergic septal motion c/w LBBB. RVSP/PASP 50-55. Mild LAE. Mild-mod TR. Mild IVCE. 30 x 5-mm echodensity in interventricular groove (poss fibrinous deposit). Sm pericardial effus at apex.  Hx of cardiac cath     Braeden's Daughters at about 12years old.     Hypertension     Obesity     Other ill-defined conditions(799.89)     heart cath 2012    Skin lesion     Umbilical hernia without obstruction or gangrene     Unspecified sleep apnea     no diagnosed     Past Surgical History:   Procedure Laterality Date    HX HEART CATHETERIZATION  07/30/12    HX HEENT dental extract     Barriers to Learning/Limitations: None  Compensate with: N/A  Prior Level of Function/Home Situation: Patient reports being independent with mobility and I/ADL's PTA. He resides in 2 story home though reports he may be returning to his father's home that is 1 story with several MARIA G. Patient has RW to borrow from his father and reported needing to \"twist\" to get out of bed and to perform samir-care PTA. Patient educated on safe log roll and deferred to OT for samir-care needs post spinal surgery with precautions . Home Situation  Home Environment: Private residence  # Steps to Enter: 0  One/Two Story Residence: One story  # of Interior Steps: 0  Height of Each Step (in): 0 inches  Interior Rails: None  Lift Chair Available: No  Living Alone: No  Support Systems: Family member(s)  Patient Expects to be Discharged to[de-identified] Private residence  Current DME Used/Available at Home: None  Critical Behavior:   A&Ox4   Strength:    Strength: Within functional limits (BLE)   Tone & Sensation:   Tone: Normal (BLE)   Sensation: Intact (BLE)   Range Of Motion:  AROM: Within functional limits (BLE, within spinal precautions)   Functional Mobility:  Bed Mobility:  Rolling: Minimum assistance (with HoB flat; log roll)  Supine to Sit: Supervision  Sit to Supine: Supervision;Contact guard assistance  Scooting: Modified independent  Transfers:  Sit to Stand: Modified independent  Stand to Sit: Modified independent    Balance:   Sitting: Intact  Standing: Intact; With support  Ambulation/Gait Training:  Distance (ft): 200 Feet (ft)  Assistive Device: Walker, rolling  Ambulation - Level of Assistance: Modified independent    Speed/Kati: Slow  Interventions: Tactile cues; Verbal cues; Visual/Demos  Stairs:  Number of Stairs Trained: 4 (step to; ascending with RLE, descending with LLE)  Stairs - Level of Assistance: Modified independent    Pain:  Pt reports 8/10 pain or discomfort prior to treatment.  (pt reports pain meds administered apprx 2hrs prior)  Pt reports 8/10 pain or discomfort post treatment. Activity Tolerance:   Patient tolerated activity well, though expressed pain with increasing ambulation distance. Please refer to the flowsheet for vital signs taken during this treatment. After treatment:   [x]         Patient left in no apparent distress sitting up in chair  []         Patient left in no apparent distress in bed  [x]         Call bell left within reach  []         Nursing notified  []         Caregiver present  []         Bed alarm activated  [x]         SCDs applied to B LE    COMMUNICATION/EDUCATION:   [x]         Fall prevention education was provided and the patient/caregiver indicated understanding. [x]         Patient/family have participated as able in goal setting and plan of care. [x]         Patient/family agree to work toward stated goals and plan of care. []         Patient understands intent and goals of therapy, but is neutral about his/her participation. []         Patient is unable to participate in goal setting and plan of care.     Thank you for this referral.  Jolly Albert, PT   Time Calculation: 24 mins

## 2018-02-09 NOTE — ROUTINE PROCESS
Mobility Intervention:       [x] Pt dangled at edge of bed    [] Pt assisted OOB to bedside commode    [x] Pt assisted OOB to chair    [x] Pt ambulated to bathroom    [x] Patient was ambulated in room/hallway    Assistive Device Utilized:       [] Rolling walker   [] Crutches   [] Straight Cane   [] Knee immobilizer   [] IV pole    After Mobilization:     [x] Patient left in no apparent distress sitting up in chair  [x] Patient left in no apparent distress in bed  [x] Call bell left within reach  [x] SCDs on & machine turned on  [x] Ice applied  [] RN notified  [] Caregiver present  [] Bed alarm activated    Reason patient not mobilized:      [] Patient refused   [] Nausea/vomiting   [] Low blood pressure   [] Drowsy/lethargic    Pain Rating:     [] 0  [] 1  Assistive Device:        [] 2  [x] 3  [] 4  [] 5  [] 6  Assistive Device:        [] 7  [] 8  [] 9  [] 10    Comments:

## 2018-02-09 NOTE — PROGRESS NOTES
Care Management Interventions  PCP Verified by CM: Yes (Dr. Liset Torres)  Mode of Transport at Discharge: Other (see comment) (dad to transport)  Transition of Care Consult (CM Consult): [facility]  Discharge Durable Medical Equipment: No (will use RW provided by his dad)  Physical Therapy Consult: Yes  Occupational Therapy Consult: Yes  Current Support Network: Other (Pt will be staying at his dad's house due to a \"really mean dog\" at his residence)  Confirm Follow Up Transport: Self (drives at baseline, dad will assist until able)  Plan discussed with Pt/Family/Caregiver: Yes  Freedom of Choice Offered: Yes  Discharge Location  Discharge Placement: Home with home health    Patient is a 55 yo male admitted for a laminectomy. Patient on OBS status. Signed OBS letter, copy given to patient, original in chart. Patient signed [facility] for [facility]. Patient will be returning to his father's address at [place] [address] due to \"a really mean dog\" at his own home. Patient will have the use of this father's walker from a previous procedure therefore does not require any DME. Referral for [facility] placed in [address] [place] [address] and called into liaison.

## 2018-02-09 NOTE — TELEPHONE ENCOUNTER
Patient calling as he came home yesterday from L 4-5 L5 S1 Lami.  His pain is now more severe than before the surgery  He is concerned and needs to be called back today at [phone]

## 2018-02-09 NOTE — TELEPHONE ENCOUNTER
Spoke with the patient. Increased low back pain. No new neurology or weakness, patient was intact when discharged this morning. I advised him to take an additional percocet now and then every 6 hours there after. Continue Gabapentin, ice, fluids and rest.    MDP sent to pharmacy. Advised to closely monitor BS.

## 2018-02-09 NOTE — ROUTINE PROCESS
0200: Stable. MARIA R drain pulled out around 2100 as the pt was getting up to go to the bathroom. Pressure applied with 4 by 4 for 5 minutes and thereafter a dressing was applied. Pt voiding well and well rested. On pain management for back pain. No nausea or vomiting. Will continue to monitor the pt.

## 2018-02-09 NOTE — PROGRESS NOTES
Rounded on patient post spine surgery. Activity: Reinforced importance of getting OOB for all meals, going to bathroom to help prevent blood clots. VTE prophylaxis: Instructed patient to use their SCD's when not up and walking. To use while in bed and in the chair. Educated re: ankle pumps to assist with circulation when in hospital and at home. Medications: Reviewed pain medications patient is taking and the importance of keeping pain under control to help with getting OOB and therapy. Reminded the patient to always eat a snack with their pain medication to help to prevent nausea. Encouraged patient to monitor for constipation and to take a stool softner/laxative while recovering and on pain medication. Incentive Spirometry: Reinforced use of incentive spirometer with return demonstration by patient. Wound Care: Dressing to surgical site reinforced with ABD pads by staff due to drain being pulled out yesterday by patient. No drainage noted on dressing. . Patient instructed not to take dressing off at home. Patient given CHG wash to use in hospital and at home. Stressed importance of using a clean towel and washcloth daily. Reminded to put on clean clothes and night clothes daily. Ice Protocol: Ice gel packs in place per protocol. Patient Safety: Call light in reach. Patient  reminded to call for help toget OOB or when leaving bathroom for safety. Phone and other items also within reach per patient's request. Reviewed back safety:no bending, lifting, twisting and no lifting anything greater than 1/2 gallon of milk. Patient reminded to log roll to get OOB. Diet: Educated patient on the importance of eating three well balanced meals a day with protein to promote bone/muscle healing. Reminded patient to drink lots of fluids to protect kidneys from all the medications being taken currently with recovery. Abdomen distended and tight. Dr Denisse Rios aware that patient is having gas cramping.  Patient educated that he needs to get OOB and walk every hour to get gas trapped in abdomen passed. Patient assisted OOB using the walker. He walked in the coughlin to the nurses station and then was assisted into the recliner. Patient to drink Pruned juice and water to help bowels get moving. Encouraged not to use a straw so he won't take in air. Patient  verbalized understand of all information and education discussed. Patient  given the opportunity for asking questions.     Mobility Intervention:       [] Pt dangled at edge of bed    [] Pt assisted OOB to bedside commode    [x] Pt assisted OOB to chair    [] Pt ambulated to bathroom    [x] Patient was ambulated in room/hallway    Assistive Device Utilized:       [x] Rolling walker   [] Crutches   [] Straight Cane   [] Knee immobilizer   [] IV pole    After Mobilization:     [x] Patient left in no apparent distress sitting up in chair  [] Patient left in no apparent distress in bed  [x] Call bell left within reach  [x] SCDs on & machine turned on  [x] Ice applied  [] RN notified  [] Caregiver present  [] Bed alarm activated    Reason patient not mobilized:      [] Patient refused   [] Nausea/vomiting   [] Low blood pressure   [] Drowsy/lethargic    Pain Rating:     [] 0  [] 1  Assistive Device:        [] 2  [x] 3  [] 4  [] 5  [] 6  Assistive Device:        [] 7  [] 8  [] 9  [] 10    Comments:

## 2018-02-09 NOTE — DISCHARGE SUMMARY
Discharge  Summary     Patient: Jonelle Sellers MRN: [id number]  SSN: [id number]    YOB: 1970  Age: 56 y.o.   Sex: male       Admit Date: 2/8/2018    Discharge Date: 2/9/2018      Admission Diagnoses: EPIDURAL LIPOMATOSIS/CAUDA EQUINE D17.779, G83.4  Epidural lipomatosis  Cauda equina compression [facility])    Discharge Diagnoses:   Problem List as of 2/9/2018  Date Reviewed: 2/7/2018          Codes Class Noted - Resolved    Cauda equina compression ([address] ICD-10-CM: G83.4  ICD-9-CM: 344.60  2/8/2018 - Present        Lumbar radiculopathy ICD-10-CM: M54.16  ICD-9-CM: 724.4  2/2/2018 - Present        Epidural lipomatosis ICD-10-CM: D17.79  ICD-9-CM: 214.8  2/2/2018 - Present        Spinal stenosis, lumbar region with neurogenic claudication ICD-10-CM: M48.062  ICD-9-CM: 724.03  1/3/2018 - Present        Lumbar neuritis ICD-10-CM: M54.16  ICD-9-CM: 724.4  1/3/2018 - Present        DDD (degenerative disc disease), lumbar ICD-10-CM: M51.36  ICD-9-CM: 722.52  1/3/2018 - Present        LBBB (left bundle branch block) ICD-10-CM: I44.7  ICD-9-CM: 426.3  12/28/2016 - Present        Essential hypertension ICD-10-CM: I10  ICD-9-CM: 401.9  12/12/2016 - Present        Right lower quadrant abdominal pain ICD-10-CM: R10.31  ICD-9-CM: 789.03  7/18/2016 - Present        Gastroesophageal reflux disease without esophagitis ICD-10-CM: K21.9  ICD-9-CM: 530.81  2/7/2016 - Present        Palpitations ICD-10-CM: R00.2  ICD-9-CM: 785.1  2/7/2016 - Present        Reflux ICD-10-CM: K21.9  ICD-9-CM: 530.81  1/28/2016 - Present        Skin lesion ICD-10-CM: L98.9  ICD-9-CM: 709.9  Unknown - Present        Bursitis disorder ICD-10-CM: M71.9  ICD-9-CM: 727.3  Unknown - Present        Umbilical hernia without obstruction or gangrene ICD-10-CM: K42.9  ICD-9-CM: 553.1  Unknown - Present        Pulmonary hypertension ICD-10-CM: I27.20  ICD-9-CM: 416.8  6/21/2012 - Present        RESOLVED: Acute sinusitis ICD-10-CM: J01.90  ICD-9-CM: 461.9 Unknown - 12/7/2015        RESOLVED: Umbilical hernia GZL-28-ZB: K42.9  ICD-9-CM: 553.1  9/18/2012 - 9/18/2012        RESOLVED: BBB (bundle branch block) ICD-10-CM: I45.4  ICD-9-CM: 426.50  6/21/2012 - 2/12/2017               Discharge Condition: Good    Procedure: L4 laminectomy, L5 laminectomy. Hospital Course: Pt accidentally pulled MARIA R drain out night after surgery. Site was ok without hematoma or bleeding. Otherwise, Normal hospital course for this procedure. Tolerated surgical intervention well. VSS Throughout. Neuro intact . Incision dry and intact, tolerating PO intake, voiding adequately. Ambulatory . Disposition: home    Discharge Medications:      My Medications      TAKE these medications as instructed       Instructions Each Dose to Equal   Morning Noon Evening Bedtime    aspirin delayed-release 81 mg tablet       Your last dose was: Your next dose is: Take  by mouth daily. B COMPLEX 1 tablet   Generic drug:  b complex vitamins       Your last dose was: Your next dose is: Take 1 Tab by mouth daily. 1 Tab                        gabapentin 600 mg tablet   Commonly known as:  NEURONTIN       Your last dose was: Your next dose is: Take 1 Tab by mouth three (3) times daily. 600 mg                        IBUPROFEN PO       Your last dose was: Your next dose is: Take  by mouth as needed. labetalol 100 mg tablet   Commonly known as:  Miguel Senate       Your last dose was: Your next dose is:              TAKE ONE TABLET BY MOUTH TWICE DAILY                         metFORMIN 500 mg tablet   Commonly known as:  GLUCOPHAGE       Your last dose was: Your next dose is: Take 1 Tab by mouth two (2) times daily (with meals).     500 mg                        oxyCODONE-acetaminophen  mg per tablet   Commonly known as:  PERCOCET       Your last dose was: Your next dose is: Take 1 Tab by mouth every six (6) hours as needed for Pain. Max Daily Amount: 4 Tabs. 1 Tab                        * traMADol 50 mg tablet   Commonly known as:  ULTRAM       Your last dose was: Your next dose is: Take 1 Tab by mouth every six (6) hours as needed for Pain. Max Daily Amount: 200 mg.    50 mg                        * traMADol 50 mg tablet   Commonly known as:  ULTRAM       Your last dose was: Your next dose is: Take 1 Tab by mouth every six (6) hours as needed for Pain. Max Daily Amount: 200 mg.    50 mg                        valsartan-hydroCHLOROthiazide 80-12.5 mg per tablet   Commonly known as:  DIOVAN-HCT       Your last dose was: Your next dose is:              TAKE ONE TABLET BY MOUTH ONCE DAILY                         * Notice: This list has 2 medication(s) that are the same as other medications prescribed for you. Read the directions carefully, and ask your doctor or other care provider to review them with you. Where to Get Your Medications      Information on where to get these meds will be given to you by the nurse or doctor. ! Ask your nurse or doctor about these medications     oxyCODONE-acetaminophen  mg per tablet                   Follow-up Appointments   Procedures    FOLLOW UP VISIT Appointment in: Two Weeks     Standing Status:   Standing     Number of Occurrences:   1     Order Specific Question:   Appointment in     Answer:    Two Weeks       Signed By: Palmer Hashimoto, NP     February 9, 2018

## 2018-02-09 NOTE — ROUTINE PROCESS
Bedside and Verbal shift change report given to [facility] LPN (oncoming nurse) by Comfort Reeves RN (offgoing nurse). Report included the following information SBAR, Kardex, MAR and Recent Results. SITUATION:    Code Status: Prior   Reason for Admission: EPIDURAL LIPOMATOSIS/CAUDA EQUINE D17.779, G83.4   Epidural lipomatosis   Cauda equina compression [facility])    [facility] day: 0   Problem List:       Hospital Problems  Date Reviewed: 2/7/2018          Codes Class Noted POA    Cauda equina compression ([facility] [address] ICD-10-CM: G83.4  ICD-9-CM: 344.60  2/8/2018 Unknown        Epidural lipomatosis ICD-10-CM: D17.79  ICD-9-CM: 214.8  2/2/2018 Unknown              BACKGROUND:    Past Medical History:   Past Medical History:   Diagnosis Date    Acute sinusitis     BBB (bundle branch block)     Bursitis disorder     Cardiac cath 07/30/2012    R dom. Patent coronary arteries. LVEDP 15.  RA 10.  RV 32/10. PA 32/18. Mean PASP 22. W 14.  CO 5.7 (TD). High CO & high O2 sats on right raise question of peripheral arterial venous shunting.  Cardiac echocardiogram 06/27/2012    EF 50-55%. Dyssynergic septal motion c/w LBBB. RVSP/PASP 50-55. Mild LAE. Mild-mod TR. Mild IVCE. 30 x 5-mm echodensity in interventricular groove (poss fibrinous deposit). Sm pericardial effus at apex.  Hx of cardiac cath     Braeden's Daughters at about 12years old.     Hypertension     Obesity     Other ill-defined conditions(799.89)     heart cath 2012    Skin lesion     Umbilical hernia without obstruction or gangrene     Unspecified sleep apnea     no diagnosed         Patient taking anticoagulants no     ASSESSMENT:    Changes in Assessment Throughout Shift: No     Patient has Central Line: no Reasons if yes: No   Patient has Bates Cath: no Reasons if yes: No      Last Vitals:     Vitals:    02/08/18 1702 02/08/18 1710 02/08/18 1742 02/08/18 2234   BP:  138/87 134/85 119/75   Pulse: 86 82 86 95   Resp: 15 15 16 18   Temp: 98.6 °F (37 °C) 97.6 °F (36.4 °C)   SpO2: 98% 98% 95% 95%   Weight:       Height:            IV and DRAINS (will only show if present)   Peripheral IV 02/08/18 Left Wrist-Site Assessment: Clean, dry, & intact  Antonio-Obando Drain 02/08/18 Left; Lower Back-Site Assessment: Clean, dry, & intact     WOUND (if present)   Wound Type:  none   Dressing present Dressing Present : Yes   Wound Concerns/Notes:  none     PAIN    Pain Assessment    Pain Intensity 1: 3 (02/08/18 2234)    Pain Location 1: Back    Pain Intervention(s) 1: Medication (see MAR)    Patient Stated Pain Goal: 0  o Interventions for Pain:  none  o Intervention effective: no  o Time of last intervention: 0700   o Reassessment Completed: no      Last 3 Weights:  Last 3 Recorded Weights in this Encounter    02/06/18 0757 02/08/18 1159   Weight: 101.6 kg (224 lb) 101.1 kg (222 lb 12.8 oz)     Weight change:      INTAKE/OUPUT    Current Shift:      Last three shifts: 02/07 1901 - 02/09 0700  In: 1500 [I.V.:1500]  Out: 945 [Urine:800; Drains:45]     LAB RESULTS   No results for input(s): WBC, HGB, HCT, PLT, HGBEXT, HCTEXT, PLTEXT in the last 72 hours. No results for input(s): NA, K, GLU, BUN, CREA, CA, MG, INR in the last 72 hours. No lab exists for component: PT, PTT, INREXT    RECOMMENDATIONS AND DISCHARGE PLANNING     1. Pending tests/procedures/ Plan of Care or Other Needs: TBD     2. Discharge plan for patient and Needs/Barriers: TBD    3. Estimated Discharge Date: TBD Posted on Whiteboard in Patients Room: no      4. The patient's care plan was reviewed with the oncoming nurse. \"HEALS\" SAFETY CHECK      Fall Risk    Total Score: 1    Safety Measures:      A safety check occurred in the patient's room between off going nurse and oncoming nurse listed above.     The safety check included the below items  Area Items   H  High Alert Medications - Verify all high alert medication drips (heparin, PCA, etc.)   E  Equipment - Suction is set up for ALL patients (with rosemary)  - Red plugs utilized for all equipment (IV pumps, etc.)  - WOWs wiped down at end of shift.  - Room stocked with oxygen, suction, and other unit-specific supplies   A  Alarms - Bed alarm is set for fall risk patients  - Ensure chair alarm is in place and activated if patient is up in a chair   L  Lines - Check IV for any infiltration  - Bates bag is empty if patient has a Bates   - Tubing and IV bags are labeled   S  Safety   - Room is clean, patient is clean, and equipment is clean. - Hallways are clear from equipment besides carts. - Fall bracelet on for fall risk patients  - Ensure room is clear and free of clutter  - Suction is set up for ALL patients (with rosemary)  - Hallways are clear from equipment besides carts.    - Isolation precautions followed, supplies available outside room, sign posted     Keyana Horn RN

## 2018-02-09 NOTE — HOME CARE
[facility] received referral for PT - Benjamín Protocol*. Address & contact numbers verified - noted patient will be staying with his father, Tyson Montoya, post discharge. DME: per patient, he has borrowed a front wheeled walker from his father. Referral called to central intake for completion and visit scheduling - patient has been discharged - ROLANDA Arnold LPN

## 2018-02-09 NOTE — PROGRESS NOTES
Problem: Self Care Deficits Care Plan (Adult)  Goal: *Acute Goals and Plan of Care (Insert Text)  Outcome: Resolved/Met Date Met: 02/09/18  Occupational Therapy EVALUATION/discharge    Patient: Earle Gallardo (18 y.o. male)  Date: 2/9/2018  Primary Diagnosis: EPIDURAL LIPOMATOSIS/CAUDA EQUINE D17.779, G83.4  Epidural lipomatosis  Cauda equina compression (HCC)  Procedure(s) (LRB):  L4/5 L5/S1 LAMINECTOMY (N/A) 1 Day Post-Op   Precautions:   Fall, Spinal    ASSESSMENT AND RECOMMENDATIONS:  Based on the objective data described below, the patient needed supervision with functional mobility/simulated toilet transfer. Patient had decreased, but functional BUE AROM and  strength. Educated patient on adaptive dressing equipment during LE bathing/dressing tasks; patient needed supervision with donning/doffing socks/pants/shoes and simulated LE bathing with adaptive equipment. Educated patient on spinal precautions as it relates to function; patient needed min verbal cues to recall precautions and to implement them during functional tasks. Patient reported difficulty with toileting tasks; patient educated on adaptive equipment/toilet aide to assist with toileting tasks. Patient reported and demonstrated understanding with supervision. Patient issued a long handled sponge, long handled shoe horn, reacher, toilet aide, and sock aide. Patient deferred to PT for mobility. Skilled acute care occupational therapy is not indicated at this time. Discharge Recommendations: Home Health  Further Equipment Recommendations for Discharge: N/A      Barriers to Learning/Limitations: None    COMPLEXITY     Eval Complexity: History: LOW Complexity : Brief history review ; Examination: LOW Complexity : 1-3 performance deficits relating to physical, cognitive , or psychosocial skils that result in activity limitations and / or participation restrictions ;  Decision Making:LOW Complexity : No comorbidities that affect functional and no verbal or physical assistance needed to complete eval tasks  Assessment: Low Complexity        G-CODES:     Self Care  Current  CI= 1-19%   Goal  CI= 1-19%   D/C  CI= 1-19%. The severity rating is based on the Level of Assistance required for Functional Mobility and ADLs. SUBJECTIVE:   Patient stated Donley Yuliana you for all that you told me.     OBJECTIVE DATA SUMMARY:     Past Medical History:   Diagnosis Date    Acute sinusitis     BBB (bundle branch block)     Bursitis disorder     Cardiac cath 07/30/2012    R dom. Patent coronary arteries. LVEDP 15.  RA 10.  RV 32/10. PA 32/18. Mean PASP 22. W 14.  CO 5.7 (TD). High CO & high O2 sats on right raise question of peripheral arterial venous shunting.  Cardiac echocardiogram 06/27/2012    EF 50-55%. Dyssynergic septal motion c/w LBBB. RVSP/PASP 50-55. Mild LAE. Mild-mod TR. Mild IVCE. 30 x 5-mm echodensity in interventricular groove (poss fibrinous deposit). Sm pericardial effus at apex.  Hx of cardiac cath     Braeden's Daughters at about 12years old.  Hypertension     Obesity     Other ill-defined conditions(799.89)     heart cath 2012    Skin lesion     Umbilical hernia without obstruction or gangrene     Unspecified sleep apnea     no diagnosed     Past Surgical History:   Procedure Laterality Date    HX HEART CATHETERIZATION  07/30/12    HX HEENT      dental extract     Prior Level of Function/Home Situation: Patient reported he was independent in basic self care tasks and functional mobility PTA.   Home Situation  Home Environment: Private residence  # Steps to Enter: 3  One/Two Story Residence: One story  # of Interior Steps: 0  Height of Each Step (in): 0 inches  Interior Rails: None  Lift Chair Available: No  Living Alone: No  Support Systems: Family member(s)  Patient Expects to be Discharged to[de-identified] Private residence  Current DME Used/Available at Home: Walker, rolling, Grab bars  Tub or Shower Type: Tub/Shower combination  [x]     Right hand dominant   []     Left hand dominant  Cognitive/Behavioral Status:  Neurologic State: Alert  Orientation Level: Oriented X4  Cognition: Follows commands    Skin: No skin changes noted    Edema: No edema noted    Vision/Perceptual:    Acuity: Within Defined Limits      Coordination:  Coordination: Within functional limits (BUEs)       Balance:  Sitting: Intact  Standing: Intact; With support    Strength:  Strength: Generally decreased, functional ( strength)     Tone & Sensation:  Tone: Normal (BUEs)  Sensation: Intact (BUEs)     Range of Motion:  AROM: Generally decreased, functional (BUEs)     Functional Mobility and Transfers for ADLs:  Bed Mobility:  Rolling: Minimum assistance (with HoB flat; log roll)  Supine to Sit: Supervision  Sit to Supine: Supervision;Contact guard assistance  Scooting: Modified independent  Transfers:  Sit to Stand: Modified independent    Toilet Transfer : Supervision (simulated )      ADL Assessment:(clinical judgement)  Feeding: Independent    Oral Facial Hygiene/Grooming: Supervision    Bathing: Supervision; Adaptive equipment    Upper Body Dressing: Supervision (adaptive strategies)    Lower Body Dressing: Supervision; Adaptive equipment    Toileting: Supervision; Adaptive equipment      Pain:  Pt reports 8/10 pain or discomfort prior to treatment.    Pt reports 8/10 pain or discomfort post treatment. Activity Tolerance:   Good    Please refer to the flowsheet for vital signs taken during this treatment. After treatment:   [x]  Patient left in no apparent distress sitting up in chair  []  Patient left in no apparent distress in bed  [x]  Call bell left within reach  [x]  Nursing notified  []  Caregiver present  []  Bed alarm activated    COMMUNICATION/EDUCATION:   Communication/Collaboration:  []      Home safety education was provided and the patient/caregiver indicated understanding.   [x]      Patient/family have participated as able and agree with findings and recommendations. []      Patient is unable to participate in plan of care at this time.     Andrzej Batres, OTR/L  Time Calculation: 49 mins

## 2018-02-09 NOTE — DISCHARGE INSTRUCTIONS
DISCHARGE SUMMARY from Nurse    PATIENT INSTRUCTIONS:    After general anesthesia or intravenous sedation, for 24 hours or while taking prescription Narcotics:  · Limit your activities  · Do not drive and operate hazardous machinery  · Do not make important personal or business decisions  · Do  not drink alcoholic beverages  · If you have not urinated within 8 hours after discharge, please contact your surgeon on call. Report the following to your surgeon:  · Excessive pain, swelling, redness or odor of or around the surgical area  · Temperature over 100.5  · Nausea and vomiting lasting longer than 4 hours or if unable to take medications  · Any signs of decreased circulation or nerve impairment to extremity: change in color, persistent  numbness, tingling, coldness or increase pain  · Any questions    What to do at Home:  Recommended activity: PT/OT per Home Health,     If you experience any of the following symptoms , please follow up with . *  Please give a list of your current medications to your Primary Care Provider. *  Please update this list whenever your medications are discontinued, doses are      changed, or new medications (including over-the-counter products) are added. *  Please carry medication information at all times in case of emergency situations. These are general instructions for a healthy lifestyle:    No smoking/ No tobacco products/ Avoid exposure to second hand smoke  Surgeon General's Warning:  Quitting smoking now greatly reduces serious risk to your health.     Obesity, smoking, and sedentary lifestyle greatly increases your risk for illness    A healthy diet, regular physical exercise & weight monitoring are important for maintaining a healthy lifestyle    You may be retaining fluid if you have a history of heart failure or if you experience any of the following symptoms:  Weight gain of 3 pounds or more overnight or 5 pounds in a week, increased swelling in our hands or feet or shortness of breath while lying flat in bed. Please call your doctor as soon as you notice any of these symptoms; do not wait until your next office visit. Recognize signs and symptoms of STROKE:    F-face looks uneven    A-arms unable to move or move unevenly    S-speech slurred or non-existent    T-time-call 911 as soon as signs and symptoms begin-DO NOT go       Back to bed or wait to see if you get better-TIME IS BRAIN. Warning Signs of HEART ATTACK     Call 911 if you have these symptoms:   Chest discomfort. Most heart attacks involve discomfort in the center of the chest that lasts more than a few minutes, or that goes away and comes back. It can feel like uncomfortable pressure, squeezing, fullness, or pain.  Discomfort in other areas of the upper body. Symptoms can include pain or discomfort in one or both arms, the back, neck, jaw, or stomach.  Shortness of breath with or without chest discomfort.  Other signs may include breaking out in a cold sweat, nausea, or lightheadedness. Don't wait more than five minutes to call 911 - MINUTES MATTER! Fast action can save your life. Calling 911 is almost always the fastest way to get lifesaving treatment. Emergency Medical Services staff can begin treatment when they arrive -- up to an hour sooner than if someone gets to the hospital by car. The discharge information has been reviewed with the patient. The patient verbalized understanding. Discharge medications reviewed with the patient and appropriate educational materials and side effects teaching were provided.   ___________________________________________________________________________________________________________________________________

## 2018-02-09 NOTE — PROGRESS NOTES
PT eval order received, chart reviewed, eval performed. Patient has met all functional goals for safe home mobility with recommendation for Home Health therapy and [facility]. Full note to follow. Thank you for this referral. Prerna Rushing, PT, DPT.

## 2018-02-09 NOTE — ROUTINE PROCESS
NO STATUS CHANGE;UP IN CHAIR IN NAD;AMBULATED IN HALLWAY W/PT;ASSESSEMENT COMPLETED;MEDS PER ORDER;CALL BELL W/IN REACH

## 2018-02-09 NOTE — PROGRESS NOTES
OT orders received and chart reviewed. Pt not seen for skilled OT due to:  []  Nausea/vomiting  [x]  Eating breakfast  []  Pain  []  Pt lethargic  []  Off Unit  Will f/u later as patients' schedule allows. Thank you.   Donna Duncan OTR/L

## 2018-02-11 ENCOUNTER — DOCUMENTATION ONLY (OUTPATIENT)
Dept: ORTHOPEDIC SURGERY | Age: 48
End: 2018-02-11

## 2018-02-11 ENCOUNTER — HOME CARE VISIT (OUTPATIENT)
Dept: SCHEDULING | Facility: HOME HEALTH | Age: 48
End: 2018-02-11
Payer: COMMERCIAL

## 2018-02-11 VITALS
HEART RATE: 76 BPM | RESPIRATION RATE: 16 BRPM | TEMPERATURE: 98.7 F | DIASTOLIC BLOOD PRESSURE: 70 MMHG | SYSTOLIC BLOOD PRESSURE: 130 MMHG

## 2018-02-11 PROCEDURE — 400013 HH SOC

## 2018-02-11 PROCEDURE — G0151 HHCP-SERV OF PT,EA 15 MIN: HCPCS

## 2018-02-11 NOTE — PROGRESS NOTES
Called pt to check on him. He has started the MDP and taking 4-5 percocet per day for the back pain.   No other issues verbalized

## 2018-02-12 ENCOUNTER — HOME CARE VISIT (OUTPATIENT)
Dept: SCHEDULING | Facility: HOME HEALTH | Age: 48
End: 2018-02-12
Payer: COMMERCIAL

## 2018-02-12 ENCOUNTER — HOME CARE VISIT (OUTPATIENT)
Dept: HOME HEALTH SERVICES | Facility: HOME HEALTH | Age: 48
End: 2018-02-12
Payer: COMMERCIAL

## 2018-02-12 PROCEDURE — A6255 ABSORPT DRG >16<=48 IN W/BDR: HCPCS

## 2018-02-12 PROCEDURE — G0157 HHC PT ASSISTANT EA 15: HCPCS

## 2018-02-13 VITALS
TEMPERATURE: 98.4 F | SYSTOLIC BLOOD PRESSURE: 124 MMHG | OXYGEN SATURATION: 98 % | DIASTOLIC BLOOD PRESSURE: 70 MMHG | HEART RATE: 78 BPM

## 2018-02-14 ENCOUNTER — HOME CARE VISIT (OUTPATIENT)
Dept: SCHEDULING | Facility: HOME HEALTH | Age: 48
End: 2018-02-14
Payer: COMMERCIAL

## 2018-02-14 PROCEDURE — G0157 HHC PT ASSISTANT EA 15: HCPCS

## 2018-02-15 ENCOUNTER — HOME CARE VISIT (OUTPATIENT)
Dept: SCHEDULING | Facility: HOME HEALTH | Age: 48
End: 2018-02-15
Payer: COMMERCIAL

## 2018-02-15 PROCEDURE — G0157 HHC PT ASSISTANT EA 15: HCPCS

## 2018-02-16 ENCOUNTER — HOME CARE VISIT (OUTPATIENT)
Dept: SCHEDULING | Facility: HOME HEALTH | Age: 48
End: 2018-02-16
Payer: COMMERCIAL

## 2018-02-16 ENCOUNTER — HOME CARE VISIT (OUTPATIENT)
Dept: HOME HEALTH SERVICES | Facility: HOME HEALTH | Age: 48
End: 2018-02-16
Payer: COMMERCIAL

## 2018-02-16 PROCEDURE — G0157 HHC PT ASSISTANT EA 15: HCPCS

## 2018-02-16 NOTE — TELEPHONE ENCOUNTER
[address]. ESTEE SAID THAT THE POST OP PATIENT TOLD HER THAT HE HIS HAVING MORE PAIN AS OF YESTERDAY. THAT THE PAIN IS GOING DOWN TO THE LEFT SIDE OF HIS HIP. Jennifer Maynard. [phone].

## 2018-02-16 NOTE — TELEPHONE ENCOUNTER
Attempted to call Mara Thomas, no answer. Called and spoke with patient. He is having LLE radicular pain. He was having shooting pains before his surgery in this leg. It is now more of a constant ache. He denies any weakness and he reports that his bladder function has improved. He states that he has a superficial bruise without any swelling, no redness and no drainage to his incision area. It sounds like normal post-op bruising. His leg pain is consistent with neuropathic pain that is healing. He is otherwise doing well and will follow-up as scheduled.

## 2018-02-19 VITALS
TEMPERATURE: 97.3 F | HEART RATE: 73 BPM | SYSTOLIC BLOOD PRESSURE: 122 MMHG | OXYGEN SATURATION: 97 % | DIASTOLIC BLOOD PRESSURE: 76 MMHG

## 2018-02-20 VITALS
SYSTOLIC BLOOD PRESSURE: 124 MMHG | DIASTOLIC BLOOD PRESSURE: 82 MMHG | DIASTOLIC BLOOD PRESSURE: 84 MMHG | HEART RATE: 72 BPM | OXYGEN SATURATION: 98 % | HEART RATE: 76 BPM | TEMPERATURE: 98 F | SYSTOLIC BLOOD PRESSURE: 124 MMHG | OXYGEN SATURATION: 98 % | TEMPERATURE: 98.4 F

## 2018-02-22 ENCOUNTER — OFFICE VISIT (OUTPATIENT)
Dept: ORTHOPEDIC SURGERY | Age: 48
End: 2018-02-22

## 2018-02-22 VITALS
TEMPERATURE: 97.4 F | HEIGHT: 65 IN | SYSTOLIC BLOOD PRESSURE: 121 MMHG | BODY MASS INDEX: 35.65 KG/M2 | WEIGHT: 214 LBS | DIASTOLIC BLOOD PRESSURE: 64 MMHG | OXYGEN SATURATION: 97 % | HEART RATE: 78 BPM | RESPIRATION RATE: 18 BRPM

## 2018-02-22 DIAGNOSIS — Z98.890 S/P LUMBAR LAMINECTOMY: Primary | ICD-10-CM

## 2018-02-22 RX ORDER — IBUPROFEN 800 MG/1
800 TABLET ORAL
Qty: 45 TAB | Refills: 0 | Status: SHIPPED | OUTPATIENT
Start: 2018-02-22 | End: 2018-05-03 | Stop reason: SDUPTHER

## 2018-02-22 NOTE — PROGRESS NOTES
Chief complaint/History of Present Illness:  Chief Complaint   Patient presents with    Surgical Follow-up     HPI  Suzanne Evans is a  56 y.o.  male      HISTORY OF PRESENT ILLNESS:  The patient comes in today two weeks status post his L4-L5 laminectomy, February 8, 2018. He states his back pain is improved some, but he is having the bilateral lower extremity pain and numbness and has not resolved yet. His urinary incontinence has gotten better, but he still has a sense of urgency. He states the pain is a different type of pain now. He ran out of his Percocet. His insurance would only cover 24 of them, so he has not had any in a week. Apparently, he called Dr. Kip Case at some point and got a Medrol Dosepak. He is not sure if that really helped or not. He rates his pain today as a 4/10. He states he is not diabetic but has some high blood sugar. He is on Neurontin 600 mg three times a day. He is a nonsmoker. He works as an  at [employer]. PHYSICAL EXAM:  Mr. Babita Becker is a 45-year-old male. He is alert and oriented. He has a normal mood and affect. He has a full weightbearing, non-antalgic gait. He has 4/5 strength of the bilateral lower extremities and negative straight leg raise. He uses no assistive device. His posterior lumbar incision is healing nicely. The edge is well approximated. There is no erythema, warmth, drainage, or signs of infection. ASSESSENT/PLAN:  This is a patient two weeks out from his L4-L5 laminectomy. His pain is not totally resolved. I think it might be from inflammation. I am going to put him on 800 mg Ibuprofen three times a day for a few days and then PRN. He is going to follow up with his blood sugar when he sees his PCP. He does have a scheduled appointment. We went over wound care and activity level.   We are going to keep him out of work until after he sees Dr. 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 We will see him back in four weeks with Dr. Girish Menezes. Review of systems:    Past Medical History:   Diagnosis Date    Acute sinusitis     BBB (bundle branch block)     Bursitis disorder     Cardiac cath 07/30/2012    R dom. Patent coronary arteries. LVEDP 15.  RA 10.  RV 32/10. PA 32/18. Mean PASP 22. W 14.  CO 5.7 (TD). High CO & high O2 sats on right raise question of peripheral arterial venous shunting.  Cardiac echocardiogram 06/27/2012    EF 50-55%. Dyssynergic septal motion c/w LBBB. RVSP/PASP 50-55. Mild LAE. Mild-mod TR. Mild IVCE. 30 x 5-mm echodensity in interventricular groove (poss fibrinous deposit). Sm pericardial effus at apex.  Hx of cardiac cath     Braeden's Daughters at about 12years old.  Hypertension     Obesity     Other ill-defined conditions(799.89)     heart cath 2012    Skin lesion     Umbilical hernia without obstruction or gangrene     Unspecified sleep apnea     no diagnosed     Past Surgical History:   Procedure Laterality Date    HX HEART CATHETERIZATION  07/30/12    HX HEENT      dental extract    HX LUMBAR LAMINECTOMY  02/08/2018    L4 laminectomy, L5 laminectomy     Social History     Social History    Marital status:      Spouse name: N/A    Number of children: N/A    Years of education: N/A     Occupational History    Not on file.      Social History Main Topics    Smoking status: Never Smoker    Smokeless tobacco: Never Used    Alcohol use 0.0 oz/week     2 - 3 Standard drinks or equivalent per week      Comment: \"few drinks every evening\"    Drug use: No    Sexual activity: Yes     Partners: Female     Other Topics Concern    Not on file     Social History Narrative     Family History   Problem Relation Age of Onset    Diabetes Mother     Cancer Mother     Cancer Other      lung; grandfather-nos       Physical Exam:  Visit Vitals    /64    Pulse 78    Temp 97.4 °F (36.3 °C) (Oral)    Resp 18    Ht 5' 5\" (1.651 m)    Wt 214 lb (97.1 kg)    SpO2 97%    BMI 35.61 kg/m2     Pain Scale: 4/10          has been . reviewed and is appropriate          Diagnoses and all orders for this visit:    1. S/P lumbar laminectomy    Other orders  -     ibuprofen (MOTRIN) 800 mg tablet; Take 1 Tab by mouth every eight (8) hours as needed for Pain. Follow-up Disposition:  Return in about 4 weeks (around 3/22/2018) for with Dr. Idania Ferreira.         We have informed [facility] to notify us for immediate appointment if he has any worsening neurogical symptoms or if an emergency situation presents, then call [phone]

## 2018-02-27 ENCOUNTER — OFFICE VISIT (OUTPATIENT)
Dept: ORTHOPEDIC SURGERY | Age: 48
End: 2018-02-27

## 2018-02-27 ENCOUNTER — DOCUMENTATION ONLY (OUTPATIENT)
Dept: ORTHOPEDIC SURGERY | Age: 48
End: 2018-02-27

## 2018-02-27 VITALS
RESPIRATION RATE: 18 BRPM | WEIGHT: 220 LBS | SYSTOLIC BLOOD PRESSURE: 118 MMHG | DIASTOLIC BLOOD PRESSURE: 67 MMHG | OXYGEN SATURATION: 100 % | BODY MASS INDEX: 36.65 KG/M2 | HEIGHT: 65 IN | TEMPERATURE: 98.2 F | HEART RATE: 84 BPM

## 2018-02-27 DIAGNOSIS — Z98.890 S/P LUMBAR LAMINECTOMY: Primary | ICD-10-CM

## 2018-02-27 NOTE — PROGRESS NOTES
Chief complaint/History of Present Illness:  Chief Complaint   Patient presents with    Follow-up    Back Pain     lower back pain      HPI  Luisa Eddy is a  56 y.o.  male      HISTORY OF PRESENT ILLNESS:  Mr. Cindi Borden comes in today following an L4-5 laminectomy on February 8, 2018, from which he did well. His right leg pain is better. He just now has burning and tightness in his toes. He comes in today because he noticed a fluid-like area on the right side of his low back next to the incision. It is nontender to palpation. It does not hurt him, but he did notice it and wanted to make sure everything was okay with it. He is on Neurontin 600 mg three times a day. He is off the narcotics. He is taking Ibuprofen about 800 mg daily. He is still pleased with the improvement in his right leg pain. He works at the [employer]. He is an .  He has not returned back to work yet, as he is still in the recovery period. He is a nonsmoker. He denies fever and bowel and bladder dysfunction. PHYSICAL EXAM:  Mr. Cindi Borden is a 44-year-old male. He is alert and oriented. He has a normal mood and affect. He has a full weightbearing, non-antalgic gait. His inspection of his incision, he has well approximated edges. There is no erythema, warmth, drainage, or signs of infection. On the right mid-section of his incision, just to the right, is a little fluid collection. It is nontender to palpation. There is no erythema. I feel like it is a seroma. It should go on and resorb by the body. ASSESSENT/PLAN:  This is a patient who underwent an L4-L5 laminectomy on February 8, 2018. He is doing well. He is still in the postop recovery period that has what appears to be a small seroma to the right of his incision. He can use moist heat on it to see if that might encourage it to resolve sooner.   Otherwise, we will see him back at his next appointment, which is with Dr. Mckay Barba on March 23, 2018. His work status will be determined at that time. Review of systems:    Past Medical History:   Diagnosis Date    Acute sinusitis     BBB (bundle branch block)     Bursitis disorder     Cardiac cath 07/30/2012    R dom. Patent coronary arteries. LVEDP 15.  RA 10.  RV 32/10. PA 32/18. Mean PASP 22. W 14.  CO 5.7 (TD). High CO & high O2 sats on right raise question of peripheral arterial venous shunting.  Cardiac echocardiogram 06/27/2012    EF 50-55%. Dyssynergic septal motion c/w LBBB. RVSP/PASP 50-55. Mild LAE. Mild-mod TR. Mild IVCE. 30 x 5-mm echodensity in interventricular groove (poss fibrinous deposit). Sm pericardial effus at apex.  Hx of cardiac cath     Braeden's Daughters at about 12years old.  Hypertension     Obesity     Other ill-defined conditions(799.89)     heart cath 2012    Skin lesion     Umbilical hernia without obstruction or gangrene     Unspecified sleep apnea     no diagnosed     Past Surgical History:   Procedure Laterality Date    HX HEART CATHETERIZATION  07/30/12    HX HEENT      dental extract    HX LUMBAR LAMINECTOMY  02/08/2018    L4 laminectomy, L5 laminectomy     Social History     Social History    Marital status:      Spouse name: N/A    Number of children: N/A    Years of education: N/A     Occupational History    Not on file.      Social History Main Topics    Smoking status: Never Smoker    Smokeless tobacco: Never Used    Alcohol use 0.0 oz/week     2 - 3 Standard drinks or equivalent per week      Comment: \"few drinks every evening\"    Drug use: No    Sexual activity: Yes     Partners: Female     Other Topics Concern    Not on file     Social History Narrative     Family History   Problem Relation Age of Onset    Diabetes Mother     Cancer Mother     Cancer Other      lung; grandfather-nos       Physical Exam:  Visit Vitals    /67    Pulse 84    Temp 98.2 °F (36.8 °C) (Oral)    Resp 18    Ht 5' 5\" (1.651 m)    Wt 220 lb (99.8 kg)    SpO2 100%    BMI 36.61 kg/m2     Pain Scale: 3/10       has been . reviewed and is appropriate          Diagnoses and all orders for this visit:    1. S/P lumbar laminectomy            Follow-up Disposition:  Return for with Dr Naveen Austin.         We have informed [facility] to notify us for immediate appointment if he has any worsening neurogical symptoms or if an emergency situation presents, then call [phone]

## 2018-02-27 NOTE — PROGRESS NOTES
Pt dropped off his supplemental claimant statement to be completed. Pt informed [employer] business, paid fee, and would like for the form to be faxed and he will  his copy in the office.     FORMS SCANNED INTO CHART!!!!

## 2018-03-01 ENCOUNTER — DOCUMENTATION ONLY (OUTPATIENT)
Dept: ORTHOPEDIC SURGERY | Age: 48
End: 2018-03-01

## 2018-03-06 RX ORDER — LABETALOL 100 MG/1
TABLET, FILM COATED ORAL
Qty: 60 TAB | Refills: 2 | Status: SHIPPED | OUTPATIENT
Start: 2018-03-06 | End: 2018-06-20 | Stop reason: SDUPTHER

## 2018-03-08 DIAGNOSIS — E11.21 TYPE II DIABETES MELLITUS WITH NEPHROPATHY (HCC): ICD-10-CM

## 2018-03-09 RX ORDER — METFORMIN HYDROCHLORIDE 500 MG/1
TABLET ORAL
Qty: 60 TAB | Refills: 1 | Status: SHIPPED | OUTPATIENT
Start: 2018-03-09 | End: 2018-06-04 | Stop reason: SDUPTHER

## 2018-03-22 DIAGNOSIS — I10 ESSENTIAL HYPERTENSION: ICD-10-CM

## 2018-03-22 RX ORDER — VALSARTAN AND HYDROCHLOROTHIAZIDE 80; 12.5 MG/1; MG/1
TABLET, FILM COATED ORAL
Qty: 30 TAB | Refills: 1 | Status: SHIPPED | OUTPATIENT
Start: 2018-03-22 | End: 2018-05-21 | Stop reason: SDUPTHER

## 2018-03-23 ENCOUNTER — OFFICE VISIT (OUTPATIENT)
Dept: ORTHOPEDIC SURGERY | Age: 48
End: 2018-03-23

## 2018-03-23 VITALS
DIASTOLIC BLOOD PRESSURE: 76 MMHG | HEART RATE: 84 BPM | RESPIRATION RATE: 17 BRPM | WEIGHT: 220 LBS | HEIGHT: 65 IN | SYSTOLIC BLOOD PRESSURE: 127 MMHG | BODY MASS INDEX: 36.65 KG/M2 | TEMPERATURE: 98.1 F | OXYGEN SATURATION: 97 %

## 2018-03-23 DIAGNOSIS — Z98.890 S/P LUMBAR LAMINECTOMY: Primary | ICD-10-CM

## 2018-03-23 DIAGNOSIS — M51.36 DDD (DEGENERATIVE DISC DISEASE), LUMBAR: ICD-10-CM

## 2018-03-23 PROBLEM — E66.01 SEVERE OBESITY (BMI 35.0-39.9) WITH COMORBIDITY (HCC): Status: ACTIVE | Noted: 2018-03-23

## 2018-03-23 NOTE — LETTER
3/23/2018 10:47 AM 
 
Mr. Barry Jc [address] 
[address] [facility] To Whom It May Concern: Barry Jc is currently under the care of [facility]. He will return to work on 4/23/18. If there are questions or concerns please have the patient contact our office.  
 
 
 
Sincerely, 
 
 
 
 
Vernon Yousif MD

## 2018-03-23 NOTE — PATIENT INSTRUCTIONS
Back Stretches: Exercises  Your Care Instructions  Here are some examples of exercises for stretching your back. Start each exercise slowly. Ease off the exercise if you start to have pain. Your doctor or physical therapist will tell you when you can start these exercises and which ones will work best for you. How to do the exercises  Overhead stretch    1. Stand comfortably with your feet shoulder-width apart. 2. Looking straight ahead, raise both arms over your head and reach toward the ceiling. Do not allow your head to tilt back. 3. Hold for 15 to 30 seconds, then lower your arms to your sides. 4. Repeat 2 to 4 times. Side stretch    1. Stand comfortably with your feet shoulder-width apart. 2. Raise one arm over your head, and then lean to the other side. 3. Slide your hand down your leg as you let the weight of your arm gently stretch your side muscles. Hold for 15 to 30 seconds. 4. Repeat 2 to 4 times on each side. Press-up    1. Lie on your stomach, supporting your body with your forearms. 2. Press your elbows down into the floor to raise your upper back. As you do this, relax your stomach muscles and allow your back to arch without using your back muscles. As your press up, do not let your hips or pelvis come off the floor. 3. Hold for 15 to 30 seconds, then relax. 4. Repeat 2 to 4 times. Relax and rest    1. Lie on your back with a rolled towel under your neck and a pillow under your knees. Extend your arms comfortably to your sides. 2. Relax and breathe normally. 3. Remain in this position for about 10 minutes. 4. If you can, do this 2 or 3 times each day. Follow-up care is a key part of your treatment and safety. Be sure to make and go to all appointments, and call your doctor if you are having problems. It's also a good idea to know your test results and keep a list of the medicines you take. Where can you learn more? Go to http://marquise-nehemias.info/.   Enter F325 in the search box to learn more about \"Back Stretches: Exercises. \"  Current as of: March 21, 2017  Content Version: 11.4  © 4134-2997 [employer]. Care instructions adapted under license by [employer] (which disclaims liability or warranty for this information). If you have questions about a medical condition or this instruction, always ask your healthcare professional. [address] any warranty or liability for your use of this information. Back Care and Preventing Injuries: Care Instructions  Your Care Instructions    You can hurt your back doing many everyday activities: lifting a heavy box, bending down to garden, exercising at the gym, and even getting out of bed. But you can keep your back strong and healthy by doing some exercises. You also can follow a few tips for sitting, sleeping, and lifting to avoid hurting your back again. Talk to your doctor before you start an exercise program. Ask for help if you want to learn more about keeping your back healthy. Follow-up care is a key part of your treatment and safety. Be sure to make and go to all appointments, and call your doctor if you are having problems. It's also a good idea to know your test results and keep a list of the medicines you take. How can you care for yourself at home? · Stay at a healthy weight to avoid strain on your lower back. · Do not smoke. Smoking increases the risk of osteoporosis, which weakens the spine. If you need help quitting, talk to your doctor about stop-smoking programs and medicines. These can increase your chances of quitting for good. · Make sure you sleep in a position that maintains your back's normal curves and on a mattress that feels comfortable. Sleep on your side with a pillow between your knees, or sleep on your back with a pillow under your knees. These positions can reduce strain on your back.   · When you get out of bed, lie on your side and bend both knees. Drop your feet over the edge of the bed as you push up with both arms. Scoot to the edge of the bed. Make sure your feet are in line with your rear end (buttocks), and then stand up. · If you must stand for a long time, put one foot on a stool, ledge, or box. Exercise to strengthen your back and other muscles  · Get at least 30 minutes of exercise on most days of the week. Walking is a good choice. You also may want to do other activities, such as running, swimming, cycling, or playing tennis or team sports. · Stretch your back muscles. Here are few exercises to try:  Patsi Feil on your back with your knees bent and your feet flat on the floor. Gently pull one bent knee to your chest. Put that foot back on the floor, and then pull the other knee to your chest. Hold for 15 to 30 seconds. Repeat 2 to 4 times. ¨ Do pelvic tilts. Lie on your back with your knees bent. Tighten your stomach muscles. Pull your belly button (navel) in and up toward your ribs. You should feel like your back is pressing to the floor and your hips and pelvis are slightly lifting off the floor. Hold for 6 seconds while breathing smoothly. · Keep your core muscles strong. The muscles of your back, belly (abdomen), and buttocks support your spine. ¨ Pull in your belly, and imagine pulling your navel toward your spine. Hold this for 6 seconds, then relax. Remember to keep breathing normally as you tense your muscles. ¨ Do curl-ups. Always do them with your knees bent. Keep your low back on the floor, and curl your shoulders toward your knees using a smooth, slow motion. Keep your arms folded across your chest. If this bothers your neck, try putting your hands behind your neck (not your head), with your elbows spread apart. ¨ Lie on your back with your knees bent and your feet flat on the floor. Tighten your belly muscles, and then push with your feet and raise your buttocks up a few inches.  Hold this position 6 seconds as you continue to breathe normally, then lower yourself slowly to the floor. Repeat 8 to 12 times. ¨ If you like group exercise, try Pilates or yoga. These classes have poses that strengthen the core muscles. Protect your back when you sit  · Place a small pillow, a rolled-up towel, or a lumbar roll in the curve of your back if you need extra support. · Sit in a chair that is low enough to let you place both feet flat on the floor with both knees nearly level with your hips. If your chair or desk is too high, use a foot rest to raise your knees. · When driving, keep your knees nearly level with your hips. Sit straight, and drive with both hands on the steering wheel. Your arms should be in a slightly bent position. · Try a kneeling chair, which helps tilt your hips forward. This takes pressure off your lower back. · Try sitting on an exercise ball. It can rock from side to side, which helps keep your back loose. Lift properly  · Squat down, bending at the hips and knees only. If you need to, put one knee to the floor and extend your other knee in front of you, bent at a right angle (half kneeling). · Press your chest straight forward. This helps keep your upper back straight while keeping a slight arch in your low back. · Hold the load as close to your body as possible, at the level of your navel. · Use your feet to change direction, taking small steps. · Lead with your hips as you change direction. Keep your shoulders in line with your hips as you move. Do not twist your body. · Set down your load carefully, squatting with your knees and hips only. When should you call for help? Watch closely for changes in your health, and be sure to contact your doctor if you have any problems. Where can you learn more? Go to http://marquise-nehemias.info/. Enter S810 in the search box to learn more about \"Back Care and Preventing Injuries: Care Instructions. \"  Current as of: March 21, 2017  Content Version: 11.4  © 5294-5419 [employer]. Care instructions adapted under license by [employer] (which disclaims liability or warranty for this information). If you have questions about a medical condition or this instruction, always ask your healthcare professional. [place] [address] any warranty or liability for your use of this information.

## 2018-03-23 NOTE — PROGRESS NOTES
[address]  [address], [address]  [place], [address] [place] [address] Street  Phone: [phone]  Fax: [phone]  PROGRESS NOTE  Patient: Luisa Eddy                MRN: [id number]       SSN: [id number]  YOB: 1970        AGE: 56 y.o. SEX: male  Body mass index is 36.61 kg/(m^2). PCP: Joe Barron MD  03/23/18    Chief Complaint   Patient presents with    Surgical Follow-up     Back        HISTORY OF PRESENT ILLNESS, RADIOGRAPHS, and PLAN:     HISTORY:  Mr. Cindi Borden returns today. He is six weeks out from his lumbar decompression. He feels improvement in his leg pain but still has some mechanical back pain. He has been slowly exercising on his own after his home therapy ended. He is neurologically intact with a well-healed wound. ASSESSMENT/PLAN: I discussed with him progression towards a return to work. He would like to just do a home exercise program and not anything directed by a physical therapist at this point. We are going to have him progress on a home-directed physical therapy program with the goal for a return to work in another four weeks. 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 I will see him back in the office in six weeks for routine follow up. cc: Mandi Qiu M.D. Past Medical History:   Diagnosis Date    Acute sinusitis     BBB (bundle branch block)     Bursitis disorder     Cardiac cath 07/30/2012    R dom. Patent coronary arteries. LVEDP 15.  RA 10.  RV 32/10. PA 32/18. Mean PASP 22. W 14.  CO 5.7 (TD). High CO & high O2 sats on right raise question of peripheral arterial venous shunting.  Cardiac echocardiogram 06/27/2012    EF 50-55%. Dyssynergic septal motion c/w LBBB. RVSP/PASP 50-55. Mild LAE. Mild-mod TR. Mild IVCE. 30 x 5-mm echodensity in interventricular groove (poss fibrinous deposit). Sm pericardial effus at apex.     Hx of cardiac cath     Braeden's Daughters at about 6 years old.  Hypertension     Obesity     Other ill-defined conditions(799.89)     heart cath 2012    Skin lesion     Umbilical hernia without obstruction or gangrene     Unspecified sleep apnea     no diagnosed       Family History   Problem Relation Age of Onset    Diabetes Mother     Cancer Mother     Cancer Other      lung; grandfather-nos       Current Outpatient Prescriptions   Medication Sig Dispense Refill    valsartan-hydroCHLOROthiazide (DIOVAN-HCT) 80-12.5 mg per tablet TAKE ONE TABLET BY MOUTH ONCE DAILY 30 Tab 1    metFORMIN (GLUCOPHAGE) 500 mg tablet TAKE ONE TABLET BY MOUTH TWICE DAILY WITH MEALS 60 Tab 1    labetalol (NORMODYNE) 100 mg tablet TAKE ONE TABLET BY MOUTH TWICE DAILY 60 Tab 2    ibuprofen (MOTRIN) 800 mg tablet Take 1 Tab by mouth every eight (8) hours as needed for Pain. 45 Tab 0    polyethylene glycol (MIRALAX) 17 gram packet Take 17 g by mouth daily.  gabapentin (NEURONTIN) 600 mg tablet Take 1 Tab by mouth three (3) times daily. 90 Tab 1    aspirin delayed-release 81 mg tablet Take  by mouth daily.  b complex vitamins (B COMPLEX 1) tablet Take 1 Tab by mouth daily.  oxyCODONE-acetaminophen (PERCOCET)  mg per tablet Take 1 Tab by mouth every six (6) hours as needed for Pain. Max Daily Amount: 4 Tabs. 30 Tab 0    traMADol (ULTRAM) 50 mg tablet Take 1 Tab by mouth every six (6) hours as needed for Pain. Max Daily Amount: 200 mg. 12 Tab 0    IBUPROFEN PO Take  by mouth as needed. Allergies   Allergen Reactions    Iodine Unknown (comments)       Past Surgical History:   Procedure Laterality Date    HX HEART CATHETERIZATION  07/30/12    HX HEENT      dental extract    HX LUMBAR LAMINECTOMY  02/08/2018    L4 laminectomy, L5 laminectomy       Past Medical History:   Diagnosis Date    Acute sinusitis     BBB (bundle branch block)     Bursitis disorder     Cardiac cath 07/30/2012    R dom. Patent coronary arteries.   LVEDP 15.  RA 10.  RV 32/10. PA 32/18. Mean PASP 22. W 14.  CO 5.7 (TD). High CO & high O2 sats on right raise question of peripheral arterial venous shunting.  Cardiac echocardiogram 06/27/2012    EF 50-55%. Dyssynergic septal motion c/w LBBB. RVSP/PASP 50-55. Mild LAE. Mild-mod TR. Mild IVCE. 30 x 5-mm echodensity in interventricular groove (poss fibrinous deposit). Sm pericardial effus at apex.  Hx of cardiac cath     Braeden's Daughters at about 12years old.  Hypertension     Obesity     Other ill-defined conditions(799.89)     heart cath 2012    Skin lesion     Umbilical hernia without obstruction or gangrene     Unspecified sleep apnea     no diagnosed       Social History     Social History    Marital status:      Spouse name: N/A    Number of children: N/A    Years of education: N/A     Occupational History    Not on file. Social History Main Topics    Smoking status: Never Smoker    Smokeless tobacco: Never Used    Alcohol use 0.0 oz/week     2 - 3 Standard drinks or equivalent per week      Comment: \"few drinks every evening\"    Drug use: No    Sexual activity: Yes     Partners: Female     Other Topics Concern    Not on file     Social History Narrative         REVIEW OF SYSTEMS:   CONSTITUTIONAL SYMPTOMS:  Negative. EYES:  Negative. EARS, NOSE, THROAT AND MOUTH:  Negative. CARDIOVASCULAR:  Negative. RESPIRATORY:  Negative. GENITOURINARY: Per HPI. GASTROINTESTINAL:  Per HPI. INTEGUMENTARY (SKIN AND/OR BREAST):  Negative. MUSCULOSKELETAL: Per HPI.   ENDOCRINE/RHEUMATOLOGIC:  Negative. NEUROLOGICAL:  Per HPI. HEMATOLOGIC/LYMPHATIC:  Negative. ALLERGIC/IMMUNOLOGIC:  Negative. PSYCHIATRIC:  Negative.     PHYSICAL EXAMINATION:   Visit Vitals    /76    Pulse 84    Temp 98.1 °F (36.7 °C) (Oral)    Resp 17    Ht 5' 5\" (1.651 m)    Wt 220 lb (99.8 kg)    SpO2 97%    BMI 36.61 kg/m2    PAIN SCALE: 3/10    CONSTITUTIONAL: The patient is in no apparent distress and is alert and oriented x 3. HEENT: Normocephalic. Hearing grossly intact. NECK: Supple and symmetric. no tenderness, or masses were felt. RESPIRATORY: No labored breathing. CARDIOVASCULAR: The carotid pulses were normal. Peripheral pulses were 2+. CHEST: Normal AP diameter and normal contour without any kyphoscoliosis. LYMPHATIC: No lymphadenopathy was appreciated in the neck, axillae or groin. SKIN:  Incision healing well, no drainage, no erythema, no hernia, no seroma, no swelling, no dehiscence, incision well approximated. Negative for scars, rashes, lesions, or ulcers on the right upper, right lower, left upper, left lower and trunk. NEUROLOGICAL: Alert and oriented x 3. Ambulation without assistive device. FWB. EXTREMITIES: See musculoskeletal.  MUSCULOSKELETAL:   Head and Neck:  Negative for misalignment, asymmetry, crepitation, defects, tenderness masses or effusions.  Left Upper Extremity: Inspection, percussion and palpation preformed. Pierres sign is negative.  Right Upper Extremity: Inspection, percussion and palpation preformed. Pierres sign is negative.  Spine, Ribs and Pelvis: Inspection, percussion and palpation preformed. Negative for misalignment, asymmetry, crepitation, defects, tenderness masses or effusions.  Left Lower Extremity: Inspection, percussion and palpation preformed. Negative straight leg raise.  Right Lower Extremity: Inspection, percussion and palpation preformed. Negative straight leg raise. SPINE EXAM:     Lumbar spine: No rash, ecchymosis, or gross obliquity. No focal atrophy is noted. ASSESSMENT    ICD-10-CM ICD-9-CM    1. S/P lumbar laminectomy Z98.890 V45.89    2. DDD (degenerative disc disease), lumbar M51.36 722.52        Written by Ander Escalera, as dictated by Devon Irwin MD.    I, Dr. Devon Irwin MD, confirm that all documentation is accurate.

## 2018-03-23 NOTE — LETTER
3/23/2018 10:45 AM 
 
Mr. Johnie Tellez [address] To Whom It May Concern: Johnie Tellez is currently under the care of [facility]. He may return to work in 4 weeks. If there are questions or concerns please have the patient contact our office.  
 
 
 
Sincerely, 
 
 
 
Adam Sandhu MD

## 2018-04-23 ENCOUNTER — DOCUMENTATION ONLY (OUTPATIENT)
Dept: ORTHOPEDIC SURGERY | Age: 48
End: 2018-04-23

## 2018-04-23 RX ORDER — GABAPENTIN 600 MG/1
TABLET ORAL
Qty: 90 TAB | Refills: 1 | Status: SHIPPED | OUTPATIENT
Start: 2018-04-23 | End: 2018-08-21 | Stop reason: SDUPTHER

## 2018-04-23 NOTE — PROGRESS NOTES
PT CAME BY MO OFFICE TO DROP OFF RETURN TO WORK FORM TO BE COMPLETED BY DR Tania Pino AND A REQ FOR OFFICE NOTES/OP NOTES TO BE SENT TO [facility] PLEASE FAX [phone] AND PLEASE CALL PT WHEN DONE.

## 2018-04-24 ENCOUNTER — DOCUMENTATION ONLY (OUTPATIENT)
Dept: ORTHOPEDIC SURGERY | Age: 48
End: 2018-04-24

## 2018-04-26 ENCOUNTER — DOCUMENTATION ONLY (OUTPATIENT)
Dept: ORTHOPEDIC SURGERY | Age: 48
End: 2018-04-26

## 2018-04-26 NOTE — PROGRESS NOTES
[facility] just [facility] date he was cleared to go back to work. .. lauri pittman completed it I faxed it back to [facility]  and sent a copy to scan

## 2018-05-03 ENCOUNTER — OFFICE VISIT (OUTPATIENT)
Dept: ORTHOPEDIC SURGERY | Age: 48
End: 2018-05-03

## 2018-05-03 VITALS
RESPIRATION RATE: 14 BRPM | WEIGHT: 222 LBS | SYSTOLIC BLOOD PRESSURE: 131 MMHG | OXYGEN SATURATION: 98 % | HEART RATE: 85 BPM | BODY MASS INDEX: 36.99 KG/M2 | DIASTOLIC BLOOD PRESSURE: 75 MMHG | TEMPERATURE: 98.5 F | HEIGHT: 65 IN

## 2018-05-03 DIAGNOSIS — M51.36 DDD (DEGENERATIVE DISC DISEASE), LUMBAR: Primary | ICD-10-CM

## 2018-05-03 DIAGNOSIS — Z98.890 S/P LUMBAR LAMINECTOMY: ICD-10-CM

## 2018-05-03 RX ORDER — IBUPROFEN 800 MG/1
800 TABLET ORAL
Qty: 90 TAB | Refills: 0 | Status: SHIPPED | OUTPATIENT
Start: 2018-05-03 | End: 2018-08-21 | Stop reason: SDUPTHER

## 2018-05-03 NOTE — PROGRESS NOTES
Chief complaint/History of Present Illness:  Chief Complaint   Patient presents with    Back Pain     HPI  Andria Barrientos is a  56 y.o.  male      HISTORY OF PRESENT ILLNESS:  The patient comes in today three months status post his L4-L5 laminectomy. He is doing well. He states his right leg pain is much better since surgery. His back pain is also better, but he does get occasional back pain on the right where it locks up from time to time. He can walk better and for a longer distance. He is exercising trying to get back in shape. He takes Neurontin 600 mg three times a day. He will take Ibuprofen 800 mg as needed for pain. He is diabetic. He does not check his blood sugar. He was supposed to return back to work at [employer] on April 18, 2018. He is an .  However, there was some issue with the paperwork, which he does now have in hand to take back to them. So, he is going to try to go back today or tomorrow. He does have restrictions of limited bending, twisting, and walking, and a 20-pound weight limit. That will be in effect until he comes back in three months. He denies fever and bowel and bladder dysfunction. He is a nonsmoker. PHYSICAL EXAM:  Mr. Christine Ferguson is a 58-year-old male. He is alert and oriented. He has a normal mood and affect. He has a full weightbearing, non-antalgic gait. He has 4/5 strength of his bilateral lower extremities and negative straight leg raise. He does have pain with hyperextension of the lumbar spine. ASSESSENT/PLAN:  This is a patient three months out from his L4-L5 laminectomy. Overall, he is much better. I have refilled his Ibuprofen. He only takes this as needed. He will continue his Neurontin. We will return him back to work today with the above restrictions. We will see him back in three months or sooner if needed.         Review of systems:    Past Medical History:   Diagnosis Date    Acute sinusitis     BBB (bundle branch block)     Bursitis disorder     Cardiac cath 07/30/2012    R dom. Patent coronary arteries. LVEDP 15.  RA 10.  RV 32/10. PA 32/18. Mean PASP 22. W 14.  CO 5.7 (TD). High CO & high O2 sats on right raise question of peripheral arterial venous shunting.  Cardiac echocardiogram 06/27/2012    EF 50-55%. Dyssynergic septal motion c/w LBBB. RVSP/PASP 50-55. Mild LAE. Mild-mod TR. Mild IVCE. 30 x 5-mm echodensity in interventricular groove (poss fibrinous deposit). Sm pericardial effus at apex.  Hx of cardiac cath     Braeden's Daughters at about 12years old.  Hypertension     Obesity     Other ill-defined conditions(799.89)     heart cath 2012    Skin lesion     Umbilical hernia without obstruction or gangrene     Unspecified sleep apnea     no diagnosed     Past Surgical History:   Procedure Laterality Date    HX HEART CATHETERIZATION  07/30/12    HX HEENT      dental extract    HX LUMBAR LAMINECTOMY  02/08/2018    L4 laminectomy, L5 laminectomy     Social History     Social History    Marital status:      Spouse name: N/A    Number of children: N/A    Years of education: N/A     Occupational History    Not on file.      Social History Main Topics    Smoking status: Never Smoker    Smokeless tobacco: Never Used    Alcohol use 0.0 oz/week     2 - 3 Standard drinks or equivalent per week      Comment: \"few drinks every evening\"    Drug use: No    Sexual activity: Yes     Partners: Female     Other Topics Concern    Not on file     Social History Narrative     Family History   Problem Relation Age of Onset    Diabetes Mother     Cancer Mother     Cancer Other      lung; grandfather-nos       Physical Exam:  Visit Vitals    /75 (BP 1 Location: Left arm, BP Patient Position: Sitting)    Pulse 85    Temp 98.5 °F (36.9 °C) (Oral)    Resp 14    Ht 5' 5\" (1.651 m)    Wt 222 lb (100.7 kg)    SpO2 98%    BMI 36.94 kg/m2     Pain Scale: 4/10       has been .reviewed and is appropriate          Diagnoses and all orders for this visit:    1. DDD (degenerative disc disease), lumbar    2. S/P lumbar laminectomy    Other orders  -     ibuprofen (MOTRIN) 800 mg tablet; Take 1 Tab by mouth every eight (8) hours as needed for Pain. Follow-up Disposition:  Return in about 3 months (around 8/3/2018) for hilda Box.         We have informed Ashlyn Weiss to notify us for immediate appointment if he has any worsening neurogical symptoms or if an emergency situation presents, then call [phone]

## 2018-05-03 NOTE — MR AVS SNAPSHOT
Yara Webber. [facility] [address] [facility] [address] 
[phone] Patient: Renetta George MRN: [id number] :1970 Visit Information Date & Time Provider Department Dept. Phone Encounter #  
 5/3/2018  9:40 AM Abdoulaye Jc NP [facility] [phone] [id number] Follow-up Instructions Return in about 3 months (around 8/3/2018) for with Charu. Follow-up and Disposition History Your Appointments 8/3/2018 10:20 AM  
Follow Up with Mateusz Drake MD  
[facility] [address] (Jocy Ramarc) Appt Note: 6 month follow up [facility] [address] [address] [address] [phone] [address] Upcoming Health Maintenance Date Due  
 LIPID PANEL Q1 1970 FOOT EXAM Q1 1981 MICROALBUMIN Q1 1981 EYE EXAM RETINAL OR DILATED Q1 1981 Pneumococcal 19-64 Medium Risk (1 of 1 - PPSV23) 1990 DTaP/Tdap/Td series (1 - Tdap) 1992 Influenza Age 5 to Adult 2018 HEMOGLOBIN A1C Q6M 2018 COLONOSCOPY 2020 Allergies as of 5/3/2018  Review Complete On: 5/3/2018 By: Abdoulaye Jc NP Severity Noted Reaction Type Reactions Iodine  2012    Unknown (comments) Current Immunizations  Reviewed on 2017 No immunizations on file. Not reviewed this visit You Were Diagnosed With   
  
 Codes Comments DDD (degenerative disc disease), lumbar    -  Primary ICD-10-CM: M51.36 
ICD-9-CM: 722.52 S/P lumbar laminectomy     ICD-10-CM: G57.948 ICD-9-CM: V45.89 Vitals BP Pulse Temp Resp Height(growth percentile) Weight(growth percentile) 131/75 (BP 1 Location: Left arm, BP Patient Position: Sitting) 85 98.5 °F (36.9 °C) (Oral) 14 5' 5\" (1.651 m) 222 lb (100.7 kg) SpO2 BMI Smoking Status 98% 36.94 kg/m2 Never Smoker BMI and BSA Data Body Mass Index Body Surface Area  
 36.94 kg/m 2 2.15 m 2 Preferred Pharmacy Pharmacy Name Phone [address] [place] [address], [address] [place] [phone] Your Updated Medication List  
  
   
This list is accurate as of 5/3/18 10:09 AM.  Always use your most recent med list.  
  
  
  
  
 aspirin delayed-release 81 mg tablet Take  by mouth daily. B COMPLEX 1 tablet Generic drug:  b complex vitamins Take 1 Tab by mouth daily. gabapentin 600 mg tablet Commonly known as:  NEURONTIN  
TAKE ONE TABLET BY MOUTH THREE TIMES DAILY * ibuprofen 800 mg tablet Commonly known as:  MOTRIN Take 1 Tab by mouth every eight (8) hours as needed for Pain. * IBUPROFEN PO Take  by mouth as needed. labetalol 100 mg tablet Commonly known as:  NORMODYNE  
TAKE ONE TABLET BY MOUTH TWICE DAILY  
  
 metFORMIN 500 mg tablet Commonly known as:  GLUCOPHAGE  
TAKE ONE TABLET BY MOUTH TWICE DAILY WITH MEALS  
  
 oxyCODONE-acetaminophen  mg per tablet Commonly known as:  PERCOCET Take 1 Tab by mouth every six (6) hours as needed for Pain. Max Daily Amount: 4 Tabs. polyethylene glycol 17 gram packet Commonly known as:  Maurene Lamp Take 17 g by mouth daily. traMADol 50 mg tablet Commonly known as:  ULTRAM  
Take 1 Tab by mouth every six (6) hours as needed for Pain. Max Daily Amount: 200 mg.  
  
 valsartan-hydroCHLOROthiazide 80-12.5 mg per tablet Commonly known as:  DIOVAN-HCT  
TAKE ONE TABLET BY MOUTH ONCE DAILY * Notice: This list has 2 medication(s) that are the same as other medications prescribed for you. Read the directions carefully, and ask your doctor or other care provider to review them with you. Prescriptions Sent to Pharmacy  Refills  
 ibuprofen (MOTRIN) 800 mg tablet 0  
 Sig: Take 1 Tab by mouth every eight (8) hours as needed for Pain. Class: Normal  
 Pharmacy: [address] [facility], [address] [facility] #: [phone] Route: Oral  
  
Follow-up Instructions Return in about 3 months (around 8/3/2018) for with Charu. Introducing [facility] SERVICES! Shabbir Peguero introduces "[employer] patient portal. Now you can access parts of your medical record, email your doctor's office, and request medication refills online. 1. In your internet browser, go to [email or web address] 2. Click on the First Time User? Click Here link in the Sign In box. You will see the New Member Sign Up page. 3. Enter your "[employer] Access Code exactly as it appears below. You will not need to use this code after youve completed the sign-up process. If you do not sign up before the expiration date, you must request a new code. · "[employer] Access Code: [email or web address] Expires: 5/8/2018  1:37 PM 
 
4. Enter the last four digits of your Social Security Number (xxxx) and Date of Birth (mm/dd/yyyy) as indicated and click Submit. You will be taken to the next sign-up page. 5. Create a "[employer] ID. This will be your "[employer] login ID and cannot be changed, so think of one that is secure and easy to remember. 6. Create a "[employer] password. You can change your password at any time. 7. Enter your Password Reset Question and Answer. This can be used at a later time if you forget your password. 8. Enter your e-mail address. You will receive e-mail notification when new information is available in [address] 9. Click Sign Up. You can now view and download portions of your medical record. 10. Click the Download Summary menu link to download a portable copy of your medical information. If you have questions, please visit the Frequently Asked Questions section of the "[employer] website.  Remember, "[employer] is NOT to be used for urgent needs. For medical emergencies, dial 911. Now available from your iPhone and Android! Please provide this summary of care documentation to your next provider. Your primary care clinician is listed as Angelo Webber. If you have any questions after today's visit, please call [phone].

## 2018-05-03 NOTE — LETTER
5/3/2018 10:06 AM 
 
Mr. Montrell Hurley [address] [address] To Whom It May Concern: Montrell Hurley Is under the care of [address] [facility]. Mr. Josefa Longoria underwent a L4 laminectomy, L5 laminectomy on 2/8/2018. His prognosis is good. He will be able to return to work on 5/3/18 with the following restrictions: Light duty with limited bending, limited twisting, limited lifting (20 pounds) and limited walking (he should not walk extreme distances). These restrictions will be in place until his follow up appointment in 3 months. He will be reevaluated for full duty. If there are questions or concerns please have the patient contact our office. Sincerely, Elissa Dunbar NP

## 2018-05-15 ENCOUNTER — OFFICE VISIT (OUTPATIENT)
Dept: INTERNAL MEDICINE CLINIC | Age: 48
End: 2018-05-15

## 2018-05-15 VITALS
HEART RATE: 94 BPM | RESPIRATION RATE: 16 BRPM | DIASTOLIC BLOOD PRESSURE: 68 MMHG | WEIGHT: 218 LBS | SYSTOLIC BLOOD PRESSURE: 120 MMHG | BODY MASS INDEX: 36.32 KG/M2 | HEIGHT: 65 IN | OXYGEN SATURATION: 98 % | TEMPERATURE: 98.7 F

## 2018-05-15 DIAGNOSIS — E11.9 DIABETES MELLITUS WITHOUT COMPLICATION (HCC): ICD-10-CM

## 2018-05-15 DIAGNOSIS — R30.9 PAIN WITH URINATION: ICD-10-CM

## 2018-05-15 DIAGNOSIS — R10.30 LOWER ABDOMINAL PAIN: Primary | ICD-10-CM

## 2018-05-15 LAB
BILIRUB UR QL STRIP: NEGATIVE
GLUCOSE UR-MCNC: NORMAL MG/DL
HBA1C MFR BLD HPLC: 7.2 %
KETONES P FAST UR STRIP-MCNC: NEGATIVE MG/DL
PH UR STRIP: 5.5 [PH] (ref 4.6–8)
PROT UR QL STRIP: NEGATIVE
SP GR UR STRIP: 1.01 (ref 1–1.03)
UA UROBILINOGEN AMB POC: NORMAL (ref 0.2–1)
URINALYSIS CLARITY POC: CLEAR
URINALYSIS COLOR POC: YELLOW
URINE BLOOD POC: NORMAL
URINE LEUKOCYTES POC: NEGATIVE
URINE NITRITES POC: NEGATIVE

## 2018-05-15 NOTE — PROGRESS NOTES
Santiago Bates is a 56 y.o. male presenting today for Abdominal Pain  . HPI:  Santiago Bates presents to the office today for left quadrant abdominal pain. Patient reports on Friday he had a sudden onset of stabbing pain in his left lower quadrant. Patient reports he took Miralax which helped. He explained it felt like something was stuck in his intestines. He notes by Saturday morning he was feeling better and by Sunday his pain had completely resolved. Mr. Monique Gore was on his way to work this morning and had a sudden onset of sharp, stabbing pain in the LLQ. Devin Manzano He notes by the time he got back home and called in sick his pain had resolved. Associated factors include fever on Friday. His pain level today is 0/10. Review of Systems   Constitutional: Positive for fever (a febrile now). Respiratory: Negative for cough. Cardiovascular: Negative for chest pain and palpitations. Gastrointestinal: Positive for abdominal pain (LLQ pain). Negative for constipation and diarrhea. Allergies   Allergen Reactions    Iodine Unknown (comments)       Current Outpatient Prescriptions   Medication Sig Dispense Refill    ibuprofen (MOTRIN) 800 mg tablet Take 1 Tab by mouth every eight (8) hours as needed for Pain. 90 Tab 0    gabapentin (NEURONTIN) 600 mg tablet TAKE ONE TABLET BY MOUTH THREE TIMES DAILY 90 Tab 1    valsartan-hydroCHLOROthiazide (DIOVAN-HCT) 80-12.5 mg per tablet TAKE ONE TABLET BY MOUTH ONCE DAILY 30 Tab 1    metFORMIN (GLUCOPHAGE) 500 mg tablet TAKE ONE TABLET BY MOUTH TWICE DAILY WITH MEALS 60 Tab 1    labetalol (NORMODYNE) 100 mg tablet TAKE ONE TABLET BY MOUTH TWICE DAILY 60 Tab 2    polyethylene glycol (MIRALAX) 17 gram packet Take 17 g by mouth daily.  aspirin delayed-release 81 mg tablet Take  by mouth daily.  b complex vitamins (B COMPLEX 1) tablet Take 1 Tab by mouth daily.  IBUPROFEN PO Take  by mouth as needed.            Past Medical History:   Diagnosis Date  Acute sinusitis     BBB (bundle branch block)     Bursitis disorder     Cardiac cath 07/30/2012    R dom. Patent coronary arteries. LVEDP 15.  RA 10.  RV 32/10. PA 32/18. Mean PASP 22. W 14.  CO 5.7 (TD). High CO & high O2 sats on right raise question of peripheral arterial venous shunting.  Cardiac echocardiogram 06/27/2012    EF 50-55%. Dyssynergic septal motion c/w LBBB. RVSP/PASP 50-55. Mild LAE. Mild-mod TR. Mild IVCE. 30 x 5-mm echodensity in interventricular groove (poss fibrinous deposit). Sm pericardial effus at apex.  Hx of cardiac cath     Braeden's Daughters at about 12years old.  Hypertension     Obesity     Other ill-defined conditions(799.89)     heart cath 2012    Skin lesion     Umbilical hernia without obstruction or gangrene     Unspecified sleep apnea     no diagnosed       Past Surgical History:   Procedure Laterality Date    HX HEART CATHETERIZATION  07/30/12    HX HEENT      dental extract    HX LUMBAR LAMINECTOMY  02/08/2018    L4 laminectomy, L5 laminectomy       Social History     Social History    Marital status:      Spouse name: N/A    Number of children: N/A    Years of education: N/A     Occupational History    Not on file. Social History Main Topics    Smoking status: Never Smoker    Smokeless tobacco: Never Used    Alcohol use 0.0 oz/week     2 - 3 Standard drinks or equivalent per week      Comment: \"few drinks every evening\"    Drug use: No    Sexual activity: Yes     Partners: Female     Other Topics Concern    Not on file     Social History Narrative       Patient does not have an advanced directive on file    Vitals:    05/15/18 1412   BP: 120/68   Pulse: 94   Resp: 16   Temp: 98.7 °F (37.1 °C)   TempSrc: Tympanic   SpO2: 98%   Weight: 218 lb (98.9 kg)   Height: 5' 5\" (1.651 m)   PainSc:   0 - No pain       Physical Exam   Constitutional: No distress. Cardiovascular: Normal rate, regular rhythm and normal heart sounds. Pulmonary/Chest: Effort normal and breath sounds normal.   Abdominal: Soft. He exhibits no distension and no mass. There is no tenderness. There is no guarding. Nursing note reviewed. Office Visit on 05/15/2018   Component Date Value Ref Range Status    Hemoglobin A1c (POC) 05/15/2018 7.2  % Final       .  Results for orders placed or performed in visit on 05/15/18   AMB POC HEMOGLOBIN A1C   Result Value Ref Range    Hemoglobin A1c (POC) 7.2 %       Assessment / Plan:      ICD-10-CM ICD-9-CM    1. Lower abdominal pain R10.30 789.09    2. Diabetes mellitus without complication (HCC) C71.5 250.00 AMB POC HEMOGLOBIN A1C   3. Pain with urination R30.9 788.1 AMB POC URINALYSIS DIP STICK AUTO W/O MICRO     Lower abdominal pain - resolved (? Kidney stone)  POC urine negative for UTI  Diabetes- Hgb A1C 7.2- improved from previous labs    Follow-up Disposition:  Return if symptoms worsen or fail to improve. I asked the patient if he  had any questions and answered his  questions.   The patient stated that he understands the treatment plan and agrees with the treatment plan

## 2018-05-21 DIAGNOSIS — I10 ESSENTIAL HYPERTENSION: ICD-10-CM

## 2018-05-22 RX ORDER — VALSARTAN AND HYDROCHLOROTHIAZIDE 80; 12.5 MG/1; MG/1
TABLET, FILM COATED ORAL
Qty: 30 TAB | Refills: 1 | Status: SHIPPED | OUTPATIENT
Start: 2018-05-22 | End: 2018-07-23 | Stop reason: SDUPTHER

## 2018-06-04 DIAGNOSIS — E11.21 TYPE II DIABETES MELLITUS WITH NEPHROPATHY (HCC): ICD-10-CM

## 2018-06-05 RX ORDER — METFORMIN HYDROCHLORIDE 500 MG/1
TABLET ORAL
Qty: 60 TAB | Refills: 1 | Status: SHIPPED | OUTPATIENT
Start: 2018-06-05 | End: 2018-08-30 | Stop reason: SDUPTHER

## 2018-06-21 NOTE — TELEPHONE ENCOUNTER
Requested Prescriptions     Pending Prescriptions Disp Refills    labetalol (NORMODYNE) 100 mg tablet [Pharmacy Med Name: LABETALOL 100MG     TAB] 60 Tab 2     Sig: TAKE 1 TABLET BY MOUTH TWICE DAILY (GENERIC  FOR  NORMODYNE)         PATIENT RUNS OUT TOMORROW

## 2018-06-22 RX ORDER — LABETALOL 100 MG/1
TABLET, FILM COATED ORAL
Qty: 180 TAB | Refills: 1 | Status: SHIPPED | OUTPATIENT
Start: 2018-06-22 | End: 2019-01-09 | Stop reason: SDUPTHER

## 2018-07-23 DIAGNOSIS — I10 ESSENTIAL HYPERTENSION: ICD-10-CM

## 2018-07-24 RX ORDER — TELMISARTAN 40 MG/1
TABLET ORAL
Qty: 90 TAB | Refills: 3 | Status: SHIPPED | OUTPATIENT
Start: 2018-07-24 | End: 2019-07-23 | Stop reason: SDUPTHER

## 2018-07-24 RX ORDER — VALSARTAN AND HYDROCHLOROTHIAZIDE 80; 12.5 MG/1; MG/1
TABLET, FILM COATED ORAL
Qty: 30 TAB | Refills: 1 | Status: SHIPPED | OUTPATIENT
Start: 2018-07-24 | End: 2018-07-30

## 2018-07-30 ENCOUNTER — OFFICE VISIT (OUTPATIENT)
Dept: INTERNAL MEDICINE CLINIC | Age: 48
End: 2018-07-30

## 2018-07-30 VITALS
WEIGHT: 217 LBS | DIASTOLIC BLOOD PRESSURE: 86 MMHG | OXYGEN SATURATION: 98 % | BODY MASS INDEX: 36.15 KG/M2 | RESPIRATION RATE: 16 BRPM | HEART RATE: 81 BPM | SYSTOLIC BLOOD PRESSURE: 132 MMHG | HEIGHT: 65 IN | TEMPERATURE: 98.3 F

## 2018-07-30 DIAGNOSIS — Z13.5 GLAUCOMA SCREENING: ICD-10-CM

## 2018-07-30 DIAGNOSIS — I10 ESSENTIAL HYPERTENSION: Primary | ICD-10-CM

## 2018-07-30 NOTE — LETTER
NOTIFICATION RETURN TO WORK / SCHOOL 
 
7/30/2018 4:03 PM 
 
Mr. Noelle Collins [address] [place] [address] To Whom It May Concern: Noelle Collins is currently under the care of [address]. He will return to work/school on: 07/31/2018 If there are questions or concerns please have the patient contact our office.  
 
 
 
Sincerely, 
 
 
 
 
 
Michael Hernandez NP

## 2018-08-01 NOTE — PROGRESS NOTES
Michelle Li is a 56 y.o. male presenting today for Ankle swelling  . HPI:  Michelle Li presents to the office today for ankle edema that has completed resolved. Patient reports yesterday after working he noted ankle edema. The swelling has resolved and denies any swelling today after working. He denies eating increase salt. He is negative for dyspnea. Review of Systems   Respiratory: Negative for cough and shortness of breath. Cardiovascular: Positive for leg swelling. Negative for chest pain and palpitations. Allergies   Allergen Reactions    Iodine Unknown (comments)       Current Outpatient Prescriptions   Medication Sig Dispense Refill    telmisartan (MICARDIS) 40 mg tablet Take one tablet by mouth daily (stop valstartan) 90 Tab 3    labetalol (NORMODYNE) 100 mg tablet TAKE 1 TABLET BY MOUTH TWICE DAILY (GENERIC  FOR  NORMODYNE) 180 Tab 1    metFORMIN (GLUCOPHAGE) 500 mg tablet TAKE 1 TABLET BY MOUTH TWICE DAILY WITH MEALS 60 Tab 1    ibuprofen (MOTRIN) 800 mg tablet Take 1 Tab by mouth every eight (8) hours as needed for Pain. 90 Tab 0    gabapentin (NEURONTIN) 600 mg tablet TAKE ONE TABLET BY MOUTH THREE TIMES DAILY 90 Tab 1    polyethylene glycol (MIRALAX) 17 gram packet Take 17 g by mouth daily.  aspirin delayed-release 81 mg tablet Take  by mouth daily.  b complex vitamins (B COMPLEX 1) tablet Take 1 Tab by mouth daily.  IBUPROFEN PO Take  by mouth as needed. Past Medical History:   Diagnosis Date    Acute sinusitis     BBB (bundle branch block)     Bursitis disorder     Cardiac cath 07/30/2012    R dom. Patent coronary arteries. LVEDP 15.  RA 10.  RV 32/10. PA 32/18. Mean PASP 22. W 14.  CO 5.7 (TD). High CO & high O2 sats on right raise question of peripheral arterial venous shunting.  Cardiac echocardiogram 06/27/2012    EF 50-55%. Dyssynergic septal motion c/w LBBB. RVSP/PASP 50-55. Mild LAE. Mild-mod TR. Mild IVCE.   30 x 5-mm echodensity in interventricular groove (poss fibrinous deposit). Sm pericardial effus at apex.  Hx of cardiac cath     Braeden's Daughters at about 12years old.  Hypertension     Obesity     Other ill-defined conditions(799.89)     heart cath 2012    Skin lesion     Umbilical hernia without obstruction or gangrene     Unspecified sleep apnea     no diagnosed       Past Surgical History:   Procedure Laterality Date    HX HEART CATHETERIZATION  07/30/12    HX HEENT      dental extract    HX LUMBAR LAMINECTOMY  02/08/2018    L4 laminectomy, L5 laminectomy       Social History     Social History    Marital status:      Spouse name: N/A    Number of children: N/A    Years of education: N/A     Occupational History    Not on file. Social History Main Topics    Smoking status: Never Smoker    Smokeless tobacco: Never Used    Alcohol use 0.0 oz/week     2 - 3 Standard drinks or equivalent per week      Comment: \"few drinks every evening\"    Drug use: No    Sexual activity: Yes     Partners: Female     Other Topics Concern    Not on file     Social History Narrative       Patient does not have an advanced directive on file    Vitals:    07/30/18 1522   BP: 132/86   Pulse: 81   Resp: 16   Temp: 98.3 °F (36.8 °C)   TempSrc: Tympanic   SpO2: 98%   Weight: 217 lb (98.4 kg)   Height: 5' 5\" (1.651 m)   PainSc:   0 - No pain       Physical Exam   Cardiovascular: Normal rate, regular rhythm and normal heart sounds. Pulmonary/Chest: Effort normal and breath sounds normal.   Musculoskeletal: He exhibits no edema or tenderness. Nursing note and vitals reviewed.       Office Visit on 05/15/2018   Component Date Value Ref Range Status    Color (UA POC) 05/15/2018 Yellow   Final    Clarity (UA POC) 05/15/2018 Clear   Final    Glucose (UA POC) 05/15/2018 2+  Negative Final    Bilirubin (UA POC) 05/15/2018 Negative  Negative Final    Ketones (UA POC) 05/15/2018 Negative  Negative Final    Specific gravity (UA POC) 05/15/2018 1.015  1.001 - 1.035 Final    Blood (UA POC) 05/15/2018 Trace  Negative Final    pH (UA POC) 05/15/2018 5.5  4.6 - 8.0 Final    Protein (UA POC) 05/15/2018 Negative  Negative Final    Urobilinogen (UA POC) 05/15/2018 0.2 mg/dL  0.2 - 1 Final    Nitrites (UA POC) 05/15/2018 Negative  Negative Final    Leukocyte esterase (UA POC) 05/15/2018 Negative  Negative Final    Hemoglobin A1c (POC) 05/15/2018 7.2  % Final       .No results found for any visits on 07/30/18. Assessment / Plan:      ICD-10-CM ICD-9-CM    1. Essential hypertension I10 401.9    2. Glaucoma screening Z13.5 V80.1 REFERRAL TO OPHTHALMOLOGY     HTN- controlled  No mony edema    Follow-up Disposition:  Return if symptoms worsen or fail to improve. I asked the patient if he  had any questions and answered his  questions.   The patient stated that he understands the treatment plan and agrees with the treatment plan

## 2018-08-21 ENCOUNTER — OFFICE VISIT (OUTPATIENT)
Dept: ORTHOPEDIC SURGERY | Age: 48
End: 2018-08-21

## 2018-08-21 VITALS
RESPIRATION RATE: 18 BRPM | DIASTOLIC BLOOD PRESSURE: 81 MMHG | HEIGHT: 65 IN | BODY MASS INDEX: 36.15 KG/M2 | TEMPERATURE: 98.1 F | HEART RATE: 78 BPM | WEIGHT: 217 LBS | SYSTOLIC BLOOD PRESSURE: 129 MMHG

## 2018-08-21 DIAGNOSIS — G89.29 CHRONIC BILATERAL LOW BACK PAIN WITHOUT SCIATICA: ICD-10-CM

## 2018-08-21 DIAGNOSIS — M54.50 CHRONIC BILATERAL LOW BACK PAIN WITHOUT SCIATICA: ICD-10-CM

## 2018-08-21 DIAGNOSIS — M79.18 MYOFACIAL MUSCLE PAIN: Primary | ICD-10-CM

## 2018-08-21 RX ORDER — TIZANIDINE 2 MG/1
2 TABLET ORAL
Qty: 90 TAB | Refills: 0 | Status: SHIPPED | OUTPATIENT
Start: 2018-08-21 | End: 2018-11-25 | Stop reason: SDUPTHER

## 2018-08-21 RX ORDER — GABAPENTIN 600 MG/1
TABLET ORAL
Qty: 90 TAB | Refills: 2 | Status: SHIPPED | OUTPATIENT
Start: 2018-08-21 | End: 2019-10-08 | Stop reason: ALTCHOICE

## 2018-08-21 RX ORDER — IBUPROFEN 800 MG/1
800 TABLET ORAL
Qty: 90 TAB | Refills: 0 | Status: SHIPPED | OUTPATIENT
Start: 2018-08-21 | End: 2018-11-25 | Stop reason: SDUPTHER

## 2018-08-21 NOTE — LETTER
8/21/2018 Mr. Cruz Billings [address] [address] To Whom It May Concern: Cruz Billings Is under the care of [address] [facility]. Mr. Ron Llanos underwent a L4 laminectomy, L5 laminectomy on 2/8/2018. His prognosis is good. He will be able to return to work on 8/22/18 with the following restrictions: Light duty with limited bending, limited twisting, limited lifting (20 pounds) and limited walking (he should not walk extreme distances). These restrictions will be in place until his follow up appointment in 3 months. He will be reevaluated for full duty. If there are questions or concerns please have the patient contact our office. Sincerely, Daniel Thakkar NP

## 2018-08-21 NOTE — PROGRESS NOTES
Chief complaint/History of Present Illness:  Chief Complaint   Patient presents with    Follow-up     Sx 2/2018     HPI  Keyanna Roque is a  56 y.o.  male      HISTORY OF PRESENT ILLNESS:  The patient comes in today, 6 months status post his L4-L5 laminectomy. His leg pain that he had bilaterally is still resolved but he is complaining now of increased back pain especially cramping in his low back. It can spread to the side laterally. He states this started about a month ago. He also gets cramping in his toes. He takes ibuprofen as needed and Neurontin 600 mg t.i.d      He denies fever, bowel or bladder dysfunction. He is a nonsmoker. He works as , currently with restrictions that are documented under the letter tab. PHYSICAL EXAMINATION:  Mr. Annita Curran is a 69-year-old male. He is alert and oriented,  Normal mood and affect. He has a full weightbearing, nonantalgic gait. He has 4/5 strength in bilateral lower extremities. Negative straight leg raise. He is a little tight over his lower lumbar musculature. ASSESSMENT/PLAN:  This patient is 6 months out from his L4-L5 laminectomy. He feels like his symptoms are returning in his back but he does not have any leg pain. He was wanting to consider an MRI but I do not think that would be beneficial at this point. I think he may be having some more myofascial pain. We are going to give him some physical therapy. We will ask him to use modalities, evaluate for a TENS unit. I am also going to add Zanaflex 2 mg t.i.d as needed for his muscle spasms. I have refilled his Neurontin and ibuprofen and we will see him back in 2 months, sooner if needed. Review of systems:    Past Medical History:   Diagnosis Date    Acute sinusitis     BBB (bundle branch block)     Bursitis disorder     Cardiac cath 07/30/2012    R dom. Patent coronary arteries. LVEDP 15.  RA 10.  RV 32/10. PA 32/18. Mean PASP 22.   W 14.  CO 5.7 (TD).  High CO & high O2 sats on right raise question of peripheral arterial venous shunting.  Cardiac echocardiogram 06/27/2012    EF 50-55%. Dyssynergic septal motion c/w LBBB. RVSP/PASP 50-55. Mild LAE. Mild-mod TR. Mild IVCE. 30 x 5-mm echodensity in interventricular groove (poss fibrinous deposit). Sm pericardial effus at apex.  Hx of cardiac cath     Braeden's Daughters at about 12years old.  Hypertension     Obesity     Other ill-defined conditions(799.89)     heart cath 2012    Skin lesion     Umbilical hernia without obstruction or gangrene     Unspecified sleep apnea     no diagnosed     Past Surgical History:   Procedure Laterality Date    HX HEART CATHETERIZATION  07/30/12    HX HEENT      dental extract    HX LUMBAR LAMINECTOMY  02/08/2018    L4 laminectomy, L5 laminectomy     Social History     Social History    Marital status:      Spouse name: N/A    Number of children: N/A    Years of education: N/A     Occupational History    Not on file. Social History Main Topics    Smoking status: Never Smoker    Smokeless tobacco: Never Used    Alcohol use 0.0 oz/week     2 - 3 Standard drinks or equivalent per week      Comment: \"few drinks every evening\"    Drug use: No    Sexual activity: Yes     Partners: Female     Other Topics Concern    Not on file     Social History Narrative     Family History   Problem Relation Age of Onset    Diabetes Mother     Cancer Mother     Cancer Other      lung; grandfather-nos       Physical Exam:  Visit Vitals    /81    Pulse 78    Temp 98.1 °F (36.7 °C) (Oral)    Resp 18    Ht 5' 5\" (1.651 m)    Wt 217 lb (98.4 kg)    BMI 36.11 kg/m2     Pain Scale: 7/10            has been . reviewed and is appropriate          Diagnoses and all orders for this visit:    1. Myofacial muscle pain  -     REFERRAL TO PHYSICAL THERAPY    2.  Chronic bilateral low back pain without sciatica  -     REFERRAL TO PHYSICAL THERAPY    Other orders  -     ibuprofen (MOTRIN) 800 mg tablet; Take 1 Tab by mouth every eight (8) hours as needed for Pain.  -     gabapentin (NEURONTIN) 600 mg tablet; TAKE ONE TABLET BY MOUTH THREE TIMES DAILY  Indications: NEUROPATHIC PAIN  -     tiZANidine (ZANAFLEX) 2 mg tablet; Take 1 Tab by mouth three (3) times daily as needed. Follow-up Disposition:  Return in about 2 months (around 10/21/2018).         We have informed Linda Varghese to notify us for immediate appointment if he has any worsening neurogical symptoms or if an emergency situation presents, then call [phone]

## 2018-08-21 NOTE — MR AVS SNAPSHOT
[address] [place] [address] [address] [place] [address] 
[phone] Patient: Jojo Bangura MRN: [id number] :1970 Visit Information Date & Time Provider Department Dept. Phone Encounter #  
 2018 10:00 AM Brad Lam NP [facility] [phone] [id number] Follow-up Instructions Return in about 2 months (around 10/21/2018). Follow-up and Disposition History Your Appointments 2018  1:40 PM  
Follow Up with Sole Atwood MD  
[facility] (Steffanie Romano) Appt Note: 6 month follow up; lvm hm# need to r/s provider out of office. Cell# VM not set up. [place]; lvm hm# need to r/s provider out of office. Cell# VM not set up. Letter mailed [place]; Called 2x. Navya Hare Letter mailed need to r/s 8/3 provider out of office. [place]; 6 month follow up/r/s'd with the patient [place] [address] [address] [address] [phone] [address] [place] [address] [address] Upcoming Health Maintenance Date Due  
 LIPID PANEL Q1 1971 FOOT EXAM Q1 1981 MICROALBUMIN Q1 1981 EYE EXAM RETINAL OR DILATED Q1 1981 Pneumococcal 19-64 Medium Risk (1 of 1 - PPSV23) 1990 DTaP/Tdap/Td series (1 - Tdap) 1992 Influenza Age 5 to Adult 2018 HEMOGLOBIN A1C Q6M 11/15/2018 COLONOSCOPY 2020 Allergies as of 2018  Review Complete On: 2018 By: Jeffrey Large Severity Noted Reaction Type Reactions Iodine  2012    Unknown (comments) Current Immunizations  Reviewed on 2017 No immunizations on file. Not reviewed this visit You Were Diagnosed With   
  
 Codes Comments Myofacial muscle pain    -  Primary ICD-10-CM: M79.1 ICD-9-CM: 729.1  Chronic bilateral low back pain without sciatica     ICD-10-CM: M54.5, G89.29 
 ICD-9-CM: 724.2, 338.29 Vitals BP Pulse Temp Resp Height(growth percentile) Weight(growth percentile) 129/81 78 98.1 °F (36.7 °C) (Oral) 18 5' 5\" (1.651 m) 217 lb (98.4 kg) BMI Smoking Status 36.11 kg/m2 Never Smoker BMI and BSA Data Body Mass Index Body Surface Area  
 36.11 kg/m 2 2.12 m 2 Preferred Pharmacy Pharmacy Name Phone [facility] [address] [place] [address], [address] [place] [address] [phone] Your Updated Medication List  
  
   
This list is accurate as of 8/21/18 11:09 AM.  Always use your most recent med list.  
  
  
  
  
 aspirin delayed-release 81 mg tablet Take  by mouth daily. B COMPLEX 1 tablet Generic drug:  b complex vitamins Take 1 Tab by mouth daily. gabapentin 600 mg tablet Commonly known as:  NEURONTIN  
TAKE ONE TABLET BY MOUTH THREE TIMES DAILY  Indications: NEUROPATHIC PAIN  
  
 * ibuprofen 800 mg tablet Commonly known as:  MOTRIN Take 1 Tab by mouth every eight (8) hours as needed for Pain. * IBUPROFEN PO Take  by mouth as needed. labetalol 100 mg tablet Commonly known as:  NORMODYNE  
TAKE 1 TABLET BY MOUTH TWICE DAILY ([address]) metFORMIN 500 mg tablet Commonly known as:  GLUCOPHAGE  
TAKE 1 TABLET BY MOUTH TWICE DAILY WITH MEALS  
  
 polyethylene glycol 17 gram packet Commonly known as:  Milli Look Take 17 g by mouth daily. telmisartan 40 mg tablet Commonly known as:  Valaria Contes Take one tablet by mouth daily (stop valstartan) tiZANidine 2 mg tablet Commonly known as:  [place]ink Take 1 Tab by mouth three (3) times daily as needed. * Notice: This list has 2 medication(s) that are the same as other medications prescribed for you. Read the directions carefully, and ask your doctor or other care provider to review them with you. Prescriptions Sent to Pharmacy Refills ibuprofen (MOTRIN) 800 mg tablet 0 Sig: Take 1 Tab by mouth every eight (8) hours as needed for Pain. Class: Normal  
 Pharmacy: [facility] DR MYLES SCHULTZ [address] [place] [address], [facility] Ph #: [phone] Route: Oral  
 gabapentin (NEURONTIN) 600 mg tablet 2 Sig: TAKE ONE TABLET BY MOUTH THREE TIMES DAILY  Indications: NEUROPATHIC PAIN Class: Normal  
 Pharmacy: [facility] [address] MYLES SCHULTZ [address] [place] [address], [facility] Ph #: [phone]  
 tiZANidine (ZANAFLEX) 2 mg tablet 0 Sig: Take 1 Tab by mouth three (3) times daily as needed. Class: Normal  
 Pharmacy: [facility] [address] MYLES SCHULTZ [address] [place] [address], [facility] Ph #: [phone] Route: Oral  
  
We Performed the Following REFERRAL TO PHYSICAL THERAPY [DDO63 Custom] Comments:  
 evaluate and treat LBP, myofascial pain Modalities, evaluate for Tens unit Follow-up Instructions Return in about 2 months (around 10/21/2018). Referral Information Referral ID Referred By Referred To  
  
 [id number] Mickey BLANCO Not Available Visits Status Start Date End Date 1 New Request 8/21/18 8/21/19 If your referral has a status of pending review or denied, additional information will be sent to support the outcome of this decision. \[facility] [place] [facility]\"" & HEALTH SERVICES! [facility] introduces [employer] patient portal. Now you can access parts of your medical record, email your doctor's office, and request medication refills online. 1. In your internet browser, go to [email or web address] Talk [email or web address]. Doctor Fun/[employer] 2. Click on the First Time User? Click Here link in the Sign In box. You will see the New Member Sign Up page. 3. Enter your BluP[employer] Access Code exactly as it appears below. You will not need to use this code after youve completed the sign-up process. If you do not sign up before the expiration date, you must request a new code. · [employer] Access Code: [email or web address] Expires: 11/19/2018  9:49 AM 
 
4. Enter the last four digits of your Social Security Number (xxxx) and Date of Birth (mm/dd/yyyy) as indicated and click Submit. You will be taken to the next sign-up page. 5. Create a Devver ID. This will be your Devver login ID and cannot be changed, so think of one that is secure and easy to remember. 6. Create a Devver password. You can change your password at any time. 7. Enter your Password Reset Question and Answer. This can be used at a later time if you forget your password. 8. Enter your e-mail address. You will receive e-mail notification when new information is available in [address] 9. Click Sign Up. You can now view and download portions of your medical record. 10. Click the Download Summary menu link to download a portable copy of your medical information. If you have questions, please visit the Frequently Asked Questions section of the [employer] website. Remember, Devver is NOT to be used for urgent needs. For medical emergencies, dial 911. Now available from your iPhone and Android! Please provide this summary of care documentation to your next provider. Your primary care clinician is listed as Xander Lack. If you have any questions after today's visit, please call [phone].

## 2018-08-28 ENCOUNTER — HOSPITAL ENCOUNTER (OUTPATIENT)
Dept: PHYSICAL THERAPY | Age: 48
Discharge: HOME | End: 2018-08-28
Payer: COMMERCIAL

## 2018-08-28 PROCEDURE — 97530 THERAPEUTIC ACTIVITIES: CPT

## 2018-08-28 PROCEDURE — 97162 PT EVAL MOD COMPLEX 30 MIN: CPT

## 2018-08-28 NOTE — PROGRESS NOTES
PT DAILY TREATMENT NOTE/LUMBAR EVAL 3-16    Patient Name: Raine Ellsworth  Date:2018  : 1970  [x]  Patient  Verified  Payor: BLUE CROSS / Plan: [address] [place] [address] / Product Type: PPO /    In time:3:15  Out time:4:00  Total Treatment Time (min): 45  Total Timed Codes (min): 23  1:1 Treatment Time ( only): 45   Visit #: 1 of 8    Treatment Area: Other intervertebral disc degeneration, lumbar region [M51.36]  Low back pain [M54.5]  SUBJECTIVE  Pain Level (0-10 scale): 4/10, 10/10 at worst during cramping  []constant []intermittent []improving []worsening []no change since onset    Any medication changes, allergies to medications, adverse drug reactions, diagnosis change, or new procedure performed?: [x] No    [] Yes (see summary sheet for update)  Subjective functional status/changes:     PLOF: living alone, 1 story house, small step to enter, crawling, climbing, heavy lifting for job   Limitations to PLOF: chronic back pain  Mechanism of Injury: laminectomy 18  Current symptoms/Complaints: Mr. Rosa Coello is a 55 y/o M pt with CC of LBP pain with radiating pain into B LEs and numbness in B feet. Pt reported laminectomy 18; pt . Pt reported prolonged walking or standing sudden cramping and tightness of back causing pain. Symptoms also aggravated with washing dishes or activities involving trunk flex.   Previous Treatment/Compliance:   PMHx/Surgical Hx:   Work Hx:   Living Situation:   Pt Goals: \"anything that better than my current condition\"  Barriers: []pain []financial []time []transportation []other  Motivation:   Substance use: []Alcohol []Tobacco []other:   FABQ Score: []low []elevate  Cognition: A & O x     Other:    OBJECTIVE/EXAMINATION  Domestic Life:   Activity/Recreational Limitations:   Mobility:   Self Care:     22 min [x]Eval                  []Re-Eval       23 min Therapeutic Activity:  []  See flow sheet :Pt edu within scope of practice on prognosis, POC, l/s anatomy, modalities use, positioning, self massage/STM for paraspinal muscles   Rationale: increase ROM, increase strength, improve coordination, improve balance and increase proprioception  to improve the patients ability to perform ADLs with ease and safety. With   [] TE   [] TA   [] neuro   [] other: Patient Education: [x] Review HEP    [] Progressed/Changed HEP based on:   [] positioning   [] body mechanics   [] transfers   [] heat/ice application    [] other:      Other Objective/Functional Measures:     Physical Therapy Evaluation - Lumbar Spine ([facility])    SUBJECTIVE  Chief Complaint:    Mechanism of injury:    Symptoms:  Aggravated by:   [] Bending [] Sitting [x] Standing [x] Walking   [] Moving [] Cough [] Sneeze [] Valsalva   [] AM  [] PM  Lying:  [] sup   [] pro   [] sidelying   [] Other:     Eased by:    [] Bending [] Sitting [] Standing [] Walking   [] Moving [] AM  [] PM  Lying: [x] sup  [] pro  [] sidelying   [] Other:     General Health:  Red Flags Indicated? [] Yes    [] No  [] Yes [] No Recent weight change (If yes, due to dieting?  [] Yes  [] No)   [x] Yes [] No Weakness in legs during walking  [] Yes [] No Unremitting pain at night  [] Yes [] No Abdominal pain or problems  [] Yes [] No Rectal bleeding  [] Yes [] No Feet more cold or painful in cold weather  [] Yes [] No Menstrual irregularities  [] Yes [] No Blood or pain with urination  [] Yes [] No Dysfunction of bowel or bladder  [] Yes [] No Recent illness within past 3 weeks (i.e, cold, flu)  [] Yes [] No Numbness/tingling in buttock/genitalia region    Past History/Treatments:     Diagnostic Tests: [] Lab work [] X-rays    [] CT [] MRI     [] Other:  Results:    Functional Status  Prior level of function:  Present functional limitations:  What position do you sleep in?:    OBJECTIVE  Posture:  Lateral Shift: [] R    [] L     [] +  [] -  Kyphosis: [] Increased [x] Decreased   []  WNL  Lordosis:  [] Increased [x] Decreased   [] WNL  Pelvic symmetry: [] WNL    [] Other:    Gait:  [x] Normal     [] Abnormal:    Active Movements: [] N/A   [] Too acute   [] Other: WFL with trunk AROM  ROM % AROM % PROM Comments:pain, area   Forward flexion 40-60      Extension 20-30      SB right 20-30      SB left 20-30      Rotation right 5-10      Rotation left 5-10        Repeated Movements   Effects on present pain: produces (MO), abolishes (A), increases (incr), decreases (decr), centralizes (C), peripheral (PH), no effect (NE)   Pre-Test Sx Flexion Repeated Flexion Extension Repeated Extension Repeated SBL Repeated SBR   Sitting  No radicular symptoms        Standing          Lying      N/A N/A   Comments:  Side Glide:  Sustained passive positioning test:    Neuro Screen [] WNL  Myotome/Dermatome/Reflexes:  Comments:    Dural Mobility:  SLR Sitting: [] R    [] L    [] +    [] -  @ (degrees):           Supine: [] R    [] L    [] +    [] -  @ (degrees):   Slump Test: [] R    [] L    [] +    [] -  @ (degrees):   Prone Knee Bend: [] R    [] L    [] +    [] -     Palpation  [] Min  [] Mod  [] Severe    Location:  [] Min  [] Mod  [] Severe    Location:  [] Min  [] Mod  [] Severe    Location:    Strength   L(0-5) R (0-5) N/T   Hip Flexion (L1,2) 3+ 3+ []   Knee Extension (L3,4) 4- 4- []   Ankle Dorsiflexion (L4) 4 4 []   Great Toe Extension (L5)   []   Ankle Plantarflexion (S1) ~3+ ~3+ []   Knee Flexion (S1,2) ~3 ~3 []   Upper Abdominals   []   Lower Abdominals   []   Paraspinals   []   Back Rotators   []   Gluteus Alejo ~3 ~3 []   Other   []     Special Tests  Lumbar:  Lumb.  Compression: [] Pos  [] Neg               Lumbar Distraction:   [] Pos  [] Neg    Quadrant:  [] Pos  [] Neg   [] Flex  [] Ext    Sacroilliac:  Gaenslen's: [] R    [] L    [] +    [] -     Compression: [] +    [] -     Gapping:  [] +    [] -     Thigh Thrust: [] R    [] L    [] +    [] -     Leg Length: [] +    [] -   Position:    Crests:    ASIS:    PSIS:    Sacral Sulcus:    Mobility: Standing flex:     Sitting flex:     Supine to sit:     Prone knee bend:         Hip: Carlen Primes:  [] R    [] L    [] +    [] -     Scour:  [] R    [] L    [] +    [] -     Piriformis: [x] R    [x] L    [x] +    [] -          Deficits: Marty's: [] R    [] L    [] +    [] -     Sohail: [x] R    [x] L    [x] +    [] -     Hamstrings 90/90: max tightness    Gastrocsoleus (to neutral): Right: Left:       Global Muscular Weakness:  Abdominals:  Quadratus Lumborum:  Paraspinals: Other:    Other tests/comments:    BP: 127/87 mmHg; HR: 76 bpm   decreased sensation with ant Left thigh and medial Rigth calf       Pain Level (0-10 scale) post treatment: 5/10    ASSESSMENT/Changes in Function: see POC    Patient will continue to benefit from skilled PT services to modify and progress therapeutic interventions, address functional mobility deficits, address ROM deficits, address strength deficits, analyze and address soft tissue restrictions, analyze and cue movement patterns, analyze and modify body mechanics/ergonomics, assess and modify postural abnormalities, address imbalance/dizziness and instruct in home and community integration to attain remaining goals.      [x]  See Plan of Care  []  See progress note/recertification  []  See Discharge Summary         Progress towards goals / Updated goals:  See POC    PLAN  [x]  Upgrade activities as tolerated     [x]  Continue plan of care  []  Update interventions per flow sheet       []  Discharge due to:_  []  Other:Joceline Mann, PT 8/28/2018  2:59 PM

## 2018-08-28 NOTE — PROGRESS NOTES
[facility] [employer] [facility] [address]  [phone] fax    Plan of Care/ Statement of Necessity for Physical Therapy Services    Patient name: Mei Lauren Start of Care: 2018   Referral source: Farhan Nagel NP : 1970    Medical Diagnosis: Other intervertebral disc degeneration, lumbar region [M51.36]  Low back pain [M54.5]   Onset Date: about 1 month ago    Treatment Diagnosis: Mid and LBP with muscle spasm   Prior Hospitalization: see medical history Provider#: [id number]   Medications: Verified on Patient summary List    Comorbidities: heart disease, diabetes, alcohol, HTN, visual impaired, l/s laminectomy (2018)   Prior Level of Function: less pain and mod Ind right after laminectomy, living alone, 1 story house, small step to enter, crawling, climbing, heavy lifting for job      The [address] [place] [address] and following information is based on the information from the initial evaluation. Assessment/ robledo information:  Mr. Jaye Blanc is a 57 y/o M pt with CC of LBP pain with radiating pain into B LEs and numbness in B feet. Pt reported laminectomy 18; significant improvement with overall pain right after the surgery. However, about several months ago, pt started to experience same pain and numbness of B feet. Pt reports during prolonged walking or standing, he experiences sudden cramping and tightness of back causing pain latter on during the day. Symptoms also aggravated during washing dishes or activities involving maintaining trunk flex. Pt also c/o persistent tenderness/hypersensitivity at wound vac site. Pt presented with [facility] trunk AROM but with min pain, severe tightness of B hips, paraspinal muscles and scar tissue at incision site, poor core strength, mod decreased hips strength. Min decreased sensation with ant Left thigh and medial Rigth calf.  Pt would benefit from skilled PT to address these deficits above for pain free functional mobility. Besides, pt reports heart disease which deemed to be rhythmic problem, will follow up with pt and cardiologist considering TENs unit use (given script by referring MD). Evaluation Complexity History HIGH Complexity :3+ comorbidities / personal factors will impact the outcome/ POC ; Examination MEDIUM Complexity : 3 Standardized tests and measures addressing body structure, function, activity limitation and / or participation in recreation  ;Presentation MEDIUM Complexity : Evolving with changing characteristics  ; Clinical Decision Making MEDIUM Complexity : FOTO score of 26-74  Overall Complexity Rating: MEDIUM  Problem List: pain affecting function, decrease ROM, decrease strength, edema affecting function, impaired gait/ balance, decrease ADL/ functional abilitiies, decrease activity tolerance, decrease flexibility/ joint mobility and decrease transfer abilities   Treatment Plan may include any combination of the following: Therapeutic exercise, Therapeutic activities, Neuromuscular re-education, Physical agent/modality, Gait/balance training, Manual therapy, Patient education, Self Care training, Functional mobility training, Home safety training and Stair training  Patient / Family readiness to learn indicated by: asking questions, trying to perform skills and interest  Persons(s) to be included in education: patient (P)  Barriers to Learning/Limitations: None  Patient Goal (s): anything that better than my current condition  Patient Self Reported Health Status: poor  Rehabilitation Potential: fair    Short term goals: To be accomplished within 1 week  1. Pt will be independent with HEP to maintain progression. Eval status: given and reviewed HEP    Long term goals: To be accomplished within 4 weeks  1. Pt will improve FOTO score by 12 points to 59/100 to show improvement with functional mobility performance. Eval status: 47     2.  Pt will report no more than 5/10 to improve QOL.  Eval status: 10/10 at worst     3. Pt will report at least 50% improvement with spasm/cramping of mid and lower back muscle so he can perform job duties and ADLs comfortably. Eval status: 0%, severe spasm starting at l/s radiating up to t/s     Frequency / Duration: Patient to be seen 1-2 times per week for 4 weeks. Patient/ CarPatient/ Caregiver education and instruction: Diagnosis, prognosis, self care, activity modification, brace/ splint application and exercises   [x]  Plan of care has been reviewed with PREET Lindsay, PT 8/28/2018 3:01 PM    ________________________________________________________________________    I certify that the above Therapy Services are being furnished while the patient is under my care. I agree with the treatment plan and certify that this therapy is necessary.     Physician's Signature:____________Date:_________TIME:________    ** Signature, Date and Time must be completed for valid certification **    Please sign and return to [facility] OF [facility] Forrest IRAIDA  [address] [place] [address]  [phone] fax

## 2018-08-30 DIAGNOSIS — E11.21 TYPE II DIABETES MELLITUS WITH NEPHROPATHY (HCC): ICD-10-CM

## 2018-09-04 RX ORDER — METFORMIN HYDROCHLORIDE 500 MG/1
TABLET ORAL
Qty: 60 TAB | Refills: 1 | Status: SHIPPED | OUTPATIENT
Start: 2018-09-04 | End: 2018-11-25 | Stop reason: SDUPTHER

## 2018-09-05 ENCOUNTER — HOSPITAL ENCOUNTER (OUTPATIENT)
Dept: PHYSICAL THERAPY | Age: 48
End: 2018-09-05
Payer: COMMERCIAL

## 2018-09-11 ENCOUNTER — HOSPITAL ENCOUNTER (OUTPATIENT)
Dept: PHYSICAL THERAPY | Age: 48
Discharge: HOME | End: 2018-09-11
Payer: COMMERCIAL

## 2018-09-11 PROCEDURE — 97110 THERAPEUTIC EXERCISES: CPT

## 2018-09-11 NOTE — PROGRESS NOTES
PT DAILY TREATMENT NOTE 12    Patient Name: Keyanna Roque  Date:2018  : 1970  [x]  Patient  Verified  Payor: Katie Snyder / Plan: [address] [place] [address] / Product Type: PPO /    In time: 9:27  Out time:10:03  Total Treatment Time (min): 36  Total Timed Codes (min): 36  1:1 Treatment Time (MC only): 30    Visit #: 2 of 8    Treatment Area: Low back pain [M54.5]  Mid back pain [M54.9]    SUBJECTIVE  Pain Level (0-10 scale): 3/10  Any medication changes, allergies to medications, adverse drug reactions, diagnosis change, or new procedure performed?: [x] No    [] Yes (see summary sheet for update)  Subjective functional status/changes:   [] No changes reported  Pt reported doing ok; he would like to keep 1x/week schedule with PT partially due to financial issue and partially work schedule. OBJECTIVE    36 min Therapeutic Exercise:  [x] See flow sheet :   Rationale: increase ROM, increase strength, improve coordination and increase proprioception to improve the patients ability to perform ADLs with ease    With   [] TE   [] TA   [] neuro   [] other: Patient Education: [x] Review HEP    [] Progressed/Changed HEP based on:   [] positioning   [] body mechanics   [] transfers   [] heat/ice application    [] other:      Other Objective/Functional Measures:    Min increased pain with therex     Pain Level (0-10 scale) post treatment: 3-4/10    ASSESSMENT/Changes in Function: Initiated treatment as POC, pt demonstrated good motivation and fair tolerance. Pt will call back to update PT with cardiologist's phone number; will contact cardiologist for clearance to perform ESTIM.     Patient will continue to benefit from skilled PT services to modify and progress therapeutic interventions, address functional mobility deficits, address ROM deficits, address strength deficits, analyze and address soft tissue restrictions, analyze and cue movement patterns, analyze and modify body mechanics/ergonomics, assess and modify postural abnormalities, address imbalance/dizziness and instruct in home and community integration to attain remaining goals. [x]  See Plan of Care  []  See progress note/recertification  []  See Discharge Summary         Progress towards goals / Updated goals:  Short term goals: To be accomplished within 1 week  1. Pt will be independent with HEP to maintain progression. Eval status: given and reviewed HEP  Current: met 9-11-18    Long term goals: To be accomplished within 4 weeks  1. Pt will improve FOTO score by 12 points to 59/100 to show improvement with functional mobility performance. Eval status: 47      2. Pt will report no more than 5/10 to improve QOL. Eval status: 10/10 at worst      3. Pt will report at least 50% improvement with spasm/cramping of mid and lower back muscle so he can perform job duties and ADLs comfortably.   Eval status: 0%, severe spasm starting at l/s radiating up to t/s     PLAN  [x]  Upgrade activities as tolerated     [x]  Continue plan of care  [x]  Update interventions per flow sheet       []  Discharge due to:_  []  Other:_      Le Mann, PT 9/11/2018  7:47 AM    Future Appointments  Date Time Provider Bib Laura   9/11/2018 9:30 AM Thishlomo Iglesias [facility]   9/13/2018 1:40 PM Mimi Tenorio MD [address] [place] [address]   9/14/2018 5:30 PM Thienimer Iglesias [facility]   9/21/2018 5:30 PM Thienimer Iglesias AFLTDGC [facility]   9/28/2018 5:30 PM Thienimer Iglesias [facility]   10/22/2018 11:30 AM Tani Giron  [address]

## 2018-09-14 ENCOUNTER — APPOINTMENT (OUTPATIENT)
Dept: PHYSICAL THERAPY | Age: 48
End: 2018-09-14
Payer: COMMERCIAL

## 2018-09-19 ENCOUNTER — OFFICE VISIT (OUTPATIENT)
Dept: CARDIOLOGY CLINIC | Age: 48
End: 2018-09-19

## 2018-09-19 VITALS
HEART RATE: 77 BPM | DIASTOLIC BLOOD PRESSURE: 82 MMHG | OXYGEN SATURATION: 98 % | SYSTOLIC BLOOD PRESSURE: 128 MMHG | HEIGHT: 65 IN | WEIGHT: 219 LBS | BODY MASS INDEX: 36.49 KG/M2

## 2018-09-19 DIAGNOSIS — I10 ESSENTIAL HYPERTENSION: ICD-10-CM

## 2018-09-19 DIAGNOSIS — E11.9 CONTROLLED TYPE 2 DIABETES MELLITUS WITHOUT COMPLICATION, WITHOUT LONG-TERM CURRENT USE OF INSULIN (HCC): ICD-10-CM

## 2018-09-19 DIAGNOSIS — I44.7 LBBB (LEFT BUNDLE BRANCH BLOCK): Primary | ICD-10-CM

## 2018-09-19 DIAGNOSIS — R00.2 PALPITATIONS: ICD-10-CM

## 2018-09-19 DIAGNOSIS — E66.9 OBESITY (BMI 35.0-39.9 WITHOUT COMORBIDITY): ICD-10-CM

## 2018-09-19 NOTE — MR AVS SNAPSHOT
[address] [address] [address] [address] [phone] Patient: Amadeo Mcmahon MRN: [id number] :1970 Visit Information Date & Time Provider Department Dept. Phone Encounter #  
 2018  1:40 PM Ava [facility] [address] [id number] Your Appointments 10/22/2018 11:30 AM  
Follow Up with Claudeen Dawley, NP  
[facility] ([address] [place] [address]) Appt Note: 2 MO F/U WITH NP  
 [address] [facility] [address]  
[phone]  
  
   
 [address] [facility] [address] Upcoming Health Maintenance Date Due  
 LIPID PANEL Q1 1970 FOOT EXAM Q1 1981 MICROALBUMIN Q1 1981 EYE EXAM RETINAL OR DILATED Q1 1981 Pneumococcal 19-64 Medium Risk (1 of 1 - PPSV23) 1990 DTaP/Tdap/Td series (1 - Tdap) 1992 Influenza Age 5 to Adult 2018 HEMOGLOBIN A1C Q6M 11/15/2018 COLONOSCOPY 2020 Allergies as of 2018  Review Complete On: 2018 By: Court Head, CORINA Severity Noted Reaction Type Reactions Iodine  2012    Unknown (comments) Current Immunizations  Reviewed on 2017 No immunizations on file. Not reviewed this visit You Were Diagnosed With   
  
 Codes Comments Palpitations    -  Primary ICD-10-CM: R00.2 ICD-9-CM: 785.1 LBBB (left bundle branch block)     ICD-10-CM: I44.7 ICD-9-CM: 426. 3 Vitals BP Pulse Height(growth percentile) Weight(growth percentile) SpO2 BMI  
 128/82 77 5' 5\" (1.651 m) 219 lb (99.3 kg) 98% 36.44 kg/m2 Smoking Status Never Smoker Vitals History BMI and BSA Data Body Mass Index Body Surface Area  
 36.44 kg/m 2 2.13 m 2 Preferred Pharmacy Pharmacy Name Phone [address] [facility], [address] [facility] [phone] Your Updated Medication List  
  
   
This list is accurate as of 9/19/18  2:26 PM.  Always use your most recent med list.  
  
  
  
  
 aspirin delayed-release 81 mg tablet Take  by mouth daily. gabapentin 600 mg tablet Commonly known as:  NEURONTIN  
TAKE ONE TABLET BY MOUTH THREE TIMES DAILY  Indications: NEUROPATHIC PAIN  
  
 ibuprofen 800 mg tablet Commonly known as:  MOTRIN Take 1 Tab by mouth every eight (8) hours as needed for Pain.  
  
 labetalol 100 mg tablet Commonly known as:  NORMODYNE  
TAKE 1 TABLET BY MOUTH TWICE DAILY ([address]) metFORMIN 500 mg tablet Commonly known as:  GLUCOPHAGE  
TAKE 1 TABLET BY MOUTH TWICE DAILY WITH MEALS  
  
 telmisartan 40 mg tablet Commonly known as:  Arvella Tiwari Take one tablet by mouth daily (stop valstartan) tiZANidine 2 mg tablet Commonly known as:  Lia Grew Take 1 Tab by mouth three (3) times daily as needed. We Performed the Following AMB POC EKG ROUTINE W/ 12 LEADS, INTER & REP [45156 CPT(R)] To-Do List   
 09/21/2018 5:30 PM  
  Appointment with Tomasa Loges at [address] ([phone])  
  
 09/28/2018 5:30 PM  
  Appointment with Tomasa Logjelly at [address] [place] [address] ([phone]) Introducing [facility]! Mian Rodriguez introduces [employer] patient portal. Now you can access parts of your medical record, email your doctor's office, and request medication refills online. 1. In your internet browser, go to [email or web address] 2. Click on the First Time User? Click Here link in the Sign In box. You will see the New Member Sign Up page. 3. Enter your [employer] Access Code exactly as it appears below. You will not need to use this code after youve completed the sign-up process.  If you do not sign up before the expiration date, you must request a new code. 
 
· [employer] Access Code: [email or web address] Expires: 11/19/2018  9:49 AM 
 
4. Enter the last four digits of your Social Security Number (xxxx) and Date of Birth (mm/dd/yyyy) as indicated and click Submit. You will be taken to the next sign-up page. 5. Create a [employer] ID. This will be your [employer] login ID and cannot be changed, so think of one that is secure and easy to remember. 6. Create a [employer] password. You can change your password at any time. 7. Enter your Password Reset Question and Answer. This can be used at a later time if you forget your password. 8. Enter your e-mail address. You will receive e-mail notification when new information is available in [address] 9. Click Sign Up. You can now view and download portions of your medical record. 10. Click the Download Summary menu link to download a portable copy of your medical information. If you have questions, please visit the Frequently Asked Questions section of the [employer] website. Remember, [employer] is NOT to be used for urgent needs. For medical emergencies, dial 911. Now available from your iPhone and Android! Please provide this summary of care documentation to your next provider. Your primary care clinician is listed as Madhu November. If you have any questions after today's visit, please call [phone].

## 2018-09-19 NOTE — PROGRESS NOTES
HISTORY OF PRESENT ILLNESS  Yara Ness is a 56 y.o. male. Follow-up   Pertinent negatives include no chest pain and no shortness of breath. Patient presents for a follow-up office visit. He has a past medical history significant for a known chronic left bundle branch block. He also has a history of questionable pulmonary hypertension based on echocardiogram in the past, so he underwent a cardiac catheterization back in 2012 which showed normal coronary anatomy, with upper normal PA pressures and no evidence of intracardiac shunting. Patient underwent follow-up noninvasive cardiac testing in January 2017. He underwent an echocardiogram which showed a low normal EF of 50-55% with mild concentric LVH and moderate pulmonary hypertension. His pharmacologic nuclear stress test was negative for ischemia. His echocardiogram was essentially unchanged compared to his previous study from 2012. Patient was last seen in the office 7 months ago. At that time he is about to undergo back surgery which he did without any complication. Since last visit he did have a single episode of heart palpitations which only lasted for a minute or 2 before subsiding. This occurred at rest several weeks ago and has not reoccurred. He denies any major change in his activity level. No new chest pain or shortness of breath, no leg swelling, no orthopnea, no PND. No dizziness syncope or near syncope. Past Medical History:   Diagnosis Date    Acute sinusitis     BBB (bundle branch block)     Bursitis disorder     Cardiac cath 07/30/2012    R dom. Patent coronary arteries. LVEDP 15.  RA 10.  RV 32/10. PA 32/18. Mean PASP 22. W 14.  CO 5.7 (TD). High CO & high O2 sats on right raise question of peripheral arterial venous shunting.  Cardiac echocardiogram 06/27/2012    EF 50-55%. Dyssynergic septal motion c/w LBBB. RVSP/PASP 50-55. Mild LAE. Mild-mod TR. Mild IVCE.   30 x 5-mm echodensity in interventricular groove (poss fibrinous deposit). Sm pericardial effus at apex.  Hx of cardiac cath     Braeden's Daughters at about 12years old.  Hypertension     Obesity     Other ill-defined conditions(799.89)     heart cath 2012    Skin lesion     Umbilical hernia without obstruction or gangrene     Unspecified sleep apnea     no diagnosed     Current Outpatient Prescriptions   Medication Sig Dispense Refill    metFORMIN (GLUCOPHAGE) 500 mg tablet TAKE 1 TABLET BY MOUTH TWICE DAILY WITH MEALS 60 Tab 1    ibuprofen (MOTRIN) 800 mg tablet Take 1 Tab by mouth every eight (8) hours as needed for Pain. 90 Tab 0    gabapentin (NEURONTIN) 600 mg tablet TAKE ONE TABLET BY MOUTH THREE TIMES DAILY  Indications: NEUROPATHIC PAIN 90 Tab 2    tiZANidine (ZANAFLEX) 2 mg tablet Take 1 Tab by mouth three (3) times daily as needed. 90 Tab 0    telmisartan (MICARDIS) 40 mg tablet Take one tablet by mouth daily (stop valstartan) 90 Tab 3    labetalol (NORMODYNE) 100 mg tablet TAKE 1 TABLET BY MOUTH TWICE DAILY (GENERIC  FOR  NORMODYNE) 180 Tab 1    aspirin delayed-release 81 mg tablet Take  by mouth daily. Allergies   Allergen Reactions    Iodine Unknown (comments)      Social History   Substance Use Topics    Smoking status: Never Smoker    Smokeless tobacco: Never Used    Alcohol use 0.0 oz/week     2 - 3 Standard drinks or equivalent per week      Comment: \"few drinks every evening\"     Family History   Problem Relation Age of Onset    Diabetes Mother     Cancer Mother     Cancer Other      lung; grandfather-nos         Review of Systems   Constitutional: Negative for chills, fever, malaise/fatigue and weight loss. HENT: Negative for nosebleeds. Eyes: Negative for blurred vision and double vision. Respiratory: Negative for cough, shortness of breath and wheezing. Cardiovascular: Positive for palpitations. Negative for chest pain, orthopnea, claudication, leg swelling and PND. Gastrointestinal: Negative for heartburn, nausea and vomiting. Genitourinary: Negative for dysuria and hematuria. Musculoskeletal: Negative for back pain, falls, joint pain and myalgias. Skin: Negative for rash. Neurological: Negative for focal weakness. Endo/Heme/Allergies: Does not bruise/bleed easily. Psychiatric/Behavioral: Negative for substance abuse. Visit Vitals    /82    Pulse 77    Ht 5' 5\" (1.651 m)    Wt 99.3 kg (219 lb)    SpO2 98%    BMI 36.44 kg/m2       Physical Exam   Constitutional: He is oriented to person, place, and time. He appears well-developed and well-nourished. HENT:   Head: Normocephalic and atraumatic. Eyes: Conjunctivae are normal.   Neck: Neck supple. No JVD present. Carotid bruit is not present. Cardiovascular: Normal rate, regular rhythm, S1 normal, S2 normal and normal pulses. Exam reveals no gallop and no S3. No murmur heard. Pulmonary/Chest: Breath sounds normal. He has no wheezes. He has no rales. Abdominal: Soft. Bowel sounds are normal. There is no tenderness. Musculoskeletal: He exhibits no edema, tenderness or deformity. Neurological: He is alert and oriented to person, place, and time. Skin: Skin is warm and dry. Psychiatric: He has a normal mood and affect. His behavior is normal. Thought content normal.     EKG:  Normal sinus rhythm, left axis deviation, atypical left bundle branch block, no change compared to his previous EKG. ASSESSMENT and PLAN    Heart palpitations. Single episode only lasting for 1 or 2 minutes at most in duration. My suspicion is that the patient was either experiencing atrial or ventricular ectopy or possibly a short run of SVT. He is already taking a beta-blocker which I would continue. Essential hypertension. Patient blood pressure appears to be well-controlled on his current medical regimen. Chronic left bundle branch block. Unchanged on EKG.  patient underwent a cardiac catheterization in 2012 which did not show any significant coronary disease. He then underwent a pharmacologic nuclear stress test in January 2017 which did not show any ischemia. Diabetes mellitus, type II. This is been managed with oral agents. This followed closely by his PCP. Follow-up annually, sooner if needed.

## 2018-09-19 NOTE — PROGRESS NOTES
Vincent Mohan presents today for   Chief Complaint   Patient presents with    Follow-up       Vincent Mohan preferred language for health care discussion is english/other. Is someone accompanying this pt? No     Is the patient using any DME equipment during OV? No     Depression Screening:  PHQ over the last two weeks 9/19/2018   Little interest or pleasure in doing things Not at all   Feeling down, depressed, irritable, or hopeless Not at all   Total Score PHQ 2 0       Learning Assessment:  Learning Assessment 2/5/2018   PRIMARY LEARNER Patient   HIGHEST LEVEL OF EDUCATION - PRIMARY LEARNER  -   BARRIERS PRIMARY LEARNER -   [address] [place] [address]    NEED -   LEARNER PREFERENCE PRIMARY DEMONSTRATION   ANSWERED BY Patient   RELATIONSHIP SELF       Abuse Screening:  Abuse Screening Questionnaire 7/18/2016   Do you ever feel afraid of your partner? N   Are you in a relationship with someone who physically or mentally threatens you? N   Is it safe for you to go home? Y       Fall Risk  Fall Risk Assessment, last 12 mths 7/18/2016   Able to walk? Yes   Fall in past 12 months? No       Pt currently taking Anticoagulant therapy? no    Coordination of Care:  1. Have you been to the ER, urgent care clinic since your last visit? Hospitalized since your last visit? No     2. Have you seen or consulted any other health care providers outside of the [address] [place] [address] since your last visit?  N/A    Patient has had an episode within the last week of racing heart/aplpiations

## 2018-09-21 ENCOUNTER — APPOINTMENT (OUTPATIENT)
Dept: PHYSICAL THERAPY | Age: 48
End: 2018-09-21
Payer: COMMERCIAL

## 2018-09-28 ENCOUNTER — HOSPITAL ENCOUNTER (OUTPATIENT)
Dept: PHYSICAL THERAPY | Age: 48
Discharge: HOME | End: 2018-09-28
Payer: COMMERCIAL

## 2018-09-28 PROCEDURE — 97110 THERAPEUTIC EXERCISES: CPT

## 2018-09-28 NOTE — PROGRESS NOTES
[facility] - Teena Santiago  [address] [facility]  [phone] fax    Physical Therapy Progress Note    Patient name: Crispin Fitzgerald Start of Care: 2018   Referral source: Brittany Crockett NP : 1970                          Medical Diagnosis: Other intervertebral disc degeneration, lumbar region [M51.36]  Low back pain [M54.5] Onset Date: about 1 month ago                          Treatment Diagnosis: Mid and LBP with muscle spasm   Prior Hospitalization: see medical history Provider#: [id number]   Medications: Verified on Patient summary List    Comorbidities: heart disease, diabetes, alcohol, HTN, visual impaired, l/s laminectomy (2018)   Prior Level of Function: less pain and mod Ind right after laminectomy, living alone, 1 story house, small step to enter, crawling, climbing, heavy lifting for job       Visits from Start of Care: 4    Missed Visits: 1    Established Goals:         Excellent           Good         Limited           None  [] Increased ROM   []  []  []  []  [] Increased Strength  []  []  []  []  [x] Increased Mobility  []  []  [x]  []   [x] Decreased Pain   []  []  [x]  []  [] Decreased Swelling  []  []  []  []    Key Functional Changes: temporary pain relief with ESTIM    Updated Goals: to be achieved in 5 weeks:  1. Pt will improve FOTO score by 12 points to 59/100 to show improvement with functional mobility performance. 2. Pt will report no more than 5/10 to improve QOL.     3. Pt will report at least 50% improvement with spasm/cramping of mid and lower back muscle so he can perform job duties and ADLs comfortably. 4. Pt will be independent with HEP to promote self management and comfortable ADLs/amb. ASSESSMENT/RECOMMENDATIONS: Pt reported temporary relief with ESTIM; he doesn't want to order one as he has his mother's old one. Pt is to bring his TENs unit to review TENs unit use next visit.  Overall, pt making slow progress due to few visits, 3 treatment sessions (pt's schedule and financial issue). Pt reported some improvement. He cont to require max VCs for core and hips therex, demonstrate poor flexibility and strength of core and hips. Pt would cont to benefit from skilled PT to address these limitations for comfortable ADLs. [x]Continue therapy per initial plan/protocol at a frequency of  1-2 x per week for 5 weeks  []Continue therapy with the following recommended changes:_____________________      _____________________________________________________________________  []Discontinue therapy progressing towards or have reached established goals  []Discontinue therapy due to lack of appreciable progress towards goals  []Discontinue therapy due to lack of attendance or compliance  []Await Physician's recommendations/decisions regarding therapy  []Other:________________________________________________________________    Thank you for this referral.   Eloisa Bull, PT 9/28/2018 6:08 PM    NOTE TO PHYSICIAN:  PLEASE COMPLETE THE ORDERS BELOW AND   FAX TO [facility]: [phone]  If you are unable to process this request in 24 hours please contact our office: [phone] I have read the above report and request that my patient continue as recommended. ? I have read the above report and request that my patient continue therapy with the following changes/special instructions:____________________________________  ? I have read the above report and request that my patient be discharged from therapy.     Physicians signature: ______________________________Date: ______Time:______

## 2018-09-28 NOTE — PROGRESS NOTES
PT DAILY TREATMENT NOTE - [facility]     Patient Name: Sophie Huizar  Date:2018  : 1970  [x]  Patient  Verified  Payor: [employer] [place] / Plan: [address] [place] [address] / Product Type: PPO /    In time: 5:28  Out time: 6:04  Total Treatment Time (min): 36  Total Timed Codes (min): 36  1:1 Treatment Time ( only): 30   Visit #: 4 of 8    Treatment Area: Other intervertebral disc degeneration, lumbar region [M51.36]  Low back pain [M54.5]    SUBJECTIVE  Pain Level (0-10 scale): 2/10  Any medication changes, allergies to medications, adverse drug reactions, diagnosis change, or new procedure performed?: [x] No    [] Yes (see summary sheet for update)  Subjective functional status/changes:   [] No changes reported  Pt reported doing good today. He still has his mother's TENs unit at home. He felt ok with ESTIM last time. OBJECTIVE    36 min Therapeutic Exercise:  [x] See flow sheet :   Rationale: increase ROM and increase strength to improve the patients ability to increase ease of ADLs              With   [] TE   [] TA   [] neuro   [] other: Patient Education: [x] Review HEP    [] Progressed/Changed HEP based on:   [] positioning   [] body mechanics   [] transfers   [] heat/ice application    [] other:      Other Objective/Functional Measures: Mod fatigued but fairly good form with therex     Pain Level (0-10 scale) post treatment: 0/10    ASSESSMENT/Changes in Function: please see progress note. Patient will continue to benefit from skilled PT services to modify and progress therapeutic interventions, address functional mobility deficits, address ROM deficits, address strength deficits, analyze and address soft tissue restrictions, analyze and cue movement patterns, analyze and modify body mechanics/ergonomics and assess and modify postural abnormalities to attain remaining goals.      Progress towards goals / Updated goals:  Short term goals: To be accomplished within 1 week  1.  Pt will be independent with HEP to maintain progression. Eval status: given and reviewed HEP  Current: met 9-11-18      Long term goals: To be accomplished within 4 weeks  1. Pt will improve FOTO score by 12 points to 59/100 to show improvement with functional mobility performance. Eval status: 47  Current: will be assessed at 5th visit 9-28-28      2. Pt will report no more than 5/10 to improve QOL. Eval status: 10/10 at worst  Not met: pt. Continues to have increased pain (9/21/18)      3. Pt will report at least 50% improvement with spasm/cramping of mid and lower back muscle so he can perform job duties and ADLs comfortably.   Eval status: 0%, severe spasm starting at l/s radiating up to t/s   Current: fluctuating symptoms and pain 9-28-18        PLAN  [x]  Upgrade activities as tolerated     [x]  Continue plan of care  [x]  Update interventions per flow sheet       []  Discharge due to:_  []  Other:_      Kim Kruger, PT 9/28/2018  8:53 AM    Future Appointments  Date Time Provider Bib Sanchez   9/28/2018 5:30 PM Lindsey Cannon [id number]   10/22/2018 11:30 AM Brigette Mackenzie  [address] 9/23/2019 10:00 AM Darlene Mendoza MD [address] [place] [address]

## 2018-10-04 ENCOUNTER — HOSPITAL ENCOUNTER (OUTPATIENT)
Dept: LAB | Age: 48
Discharge: HOME | End: 2018-10-04
Payer: COMMERCIAL

## 2018-10-04 ENCOUNTER — OFFICE VISIT (OUTPATIENT)
Dept: INTERNAL MEDICINE CLINIC | Age: 48
End: 2018-10-04

## 2018-10-04 VITALS
HEART RATE: 76 BPM | TEMPERATURE: 98 F | RESPIRATION RATE: 16 BRPM | WEIGHT: 218 LBS | SYSTOLIC BLOOD PRESSURE: 130 MMHG | DIASTOLIC BLOOD PRESSURE: 72 MMHG | BODY MASS INDEX: 36.32 KG/M2 | OXYGEN SATURATION: 98 % | HEIGHT: 65 IN

## 2018-10-04 DIAGNOSIS — L03.032 PARONYCHIA OF GREAT TOE, LEFT: Primary | ICD-10-CM

## 2018-10-04 DIAGNOSIS — I10 ESSENTIAL HYPERTENSION: ICD-10-CM

## 2018-10-04 DIAGNOSIS — E11.9 CONTROLLED TYPE 2 DIABETES MELLITUS WITHOUT COMPLICATION, WITHOUT LONG-TERM CURRENT USE OF INSULIN (HCC): ICD-10-CM

## 2018-10-04 DIAGNOSIS — L03.032 PARONYCHIA OF GREAT TOE, LEFT: ICD-10-CM

## 2018-10-04 LAB — HBA1C MFR BLD HPLC: 6.9 %

## 2018-10-04 PROCEDURE — 87070 CULTURE OTHR SPECIMN AEROBIC: CPT | Performed by: NURSE PRACTITIONER

## 2018-10-04 PROCEDURE — 36415 COLL VENOUS BLD VENIPUNCTURE: CPT | Performed by: NURSE PRACTITIONER

## 2018-10-04 RX ORDER — CEPHALEXIN 250 MG/1
250 CAPSULE ORAL 4 TIMES DAILY
Qty: 40 CAP | Refills: 0 | Status: SHIPPED | OUTPATIENT
Start: 2018-10-04 | End: 2018-10-14

## 2018-10-04 NOTE — PATIENT INSTRUCTIONS
Body Mass Index: Care Instructions  Your Care Instructions    Body mass index (BMI) can help you see if your weight is raising your risk for health problems. It uses a formula to compare how much you weigh with how tall you are. · A BMI lower than 18.5 is considered underweight. · A BMI between 18.5 and 24.9 is considered healthy. · A BMI between 25 and 29.9 is considered overweight. A BMI of 30 or higher is considered obese. If your BMI is in the normal range, it means that you have a lower risk for weight-related health problems. If your BMI is in the overweight or obese range, you may be at increased risk for weight-related health problems, such as high blood pressure, heart disease, stroke, arthritis or joint pain, and diabetes. If your BMI is in the underweight range, you may be at increased risk for health problems such as fatigue, lower protection (immunity) against illness, muscle loss, bone loss, hair loss, and hormone problems. BMI is just one measure of your risk for weight-related health problems. You may be at higher risk for health problems if you are not active, you eat an unhealthy diet, or you drink too much alcohol or use tobacco products. Follow-up care is a key part of your treatment and safety. Be sure to make and go to all appointments, and call your doctor if you are having problems. It's also a good idea to know your test results and keep a list of the medicines you take. How can you care for yourself at home? · Practice healthy eating habits. This includes eating plenty of fruits, vegetables, whole grains, lean protein, and low-fat dairy. · If your doctor recommends it, get more exercise. Walking is a good choice. Bit by bit, increase the amount you walk every day. Try for at least 30 minutes on most days of the week. · Do not smoke. Smoking can increase your risk for health problems. If you need help quitting, talk to your doctor about stop-smoking programs and medicines. These can increase your chances of quitting for good. · Limit alcohol to 2 drinks a day for men and 1 drink a day for women. Too much alcohol can cause health problems. If you have a BMI higher than 25  · Your doctor may do other tests to check your risk for weight-related health problems. This may include measuring the distance around your waist. A waist measurement of more than 40 inches in men or 35 inches in women can increase the risk of weight-related health problems. · Talk with your doctor about steps you can take to stay healthy or improve your health. You may need to make lifestyle changes to lose weight and stay healthy, such as changing your diet and getting regular exercise. If you have a BMI lower than 18.5  · Your doctor may do other tests to check your risk for health problems. · Talk with your doctor about steps you can take to stay healthy or improve your health. You may need to make lifestyle changes to gain or maintain weight and stay healthy, such as getting more healthy foods in your diet and doing exercises to build muscle. Where can you learn more? Go to http://marquise-nehemias.info/. Enter S176 in the search box to learn more about \"Body Mass Index: Care Instructions. \"  Current as of: October 13, 2016  Content Version: 11.4  © 1335-1071 [employer]. Care instructions adapted under license by [employer] (which disclaims liability or warranty for this information). If you have questions about a medical condition or this instruction, always ask your healthcare professional. [address] any warranty or liability for your use of this information.

## 2018-10-04 NOTE — LETTER
NOTIFICATION RETURN TO WORK / SCHOOL 
 
10/4/2018 11:17 AM 
 
Mr. Pardeep Guerra [address] [place] [id number] To Whom It May Concern: Pardeep Guerra is currently under the care of [address] [facility]. He will return to work/school on:  October 5, 2018 If there are questions or concerns please have the patient contact our office.  
 
 
 
Sincerely, 
 
 
 
Joann Magaña NP

## 2018-10-04 NOTE — PROGRESS NOTES
Chief Complaint   Patient presents with    Toe Swelling         Is someone accompanying this pt? no    Is the patient using any DME equipment during OV? no    Depression Screening:  PHQ over the last two weeks 10/4/2018 9/19/2018 5/15/2018 12/11/2017 12/12/2016 12/9/2015   Little interest or pleasure in doing things Not at all Not at all Not at all Not at all Not at all Not at all   Feeling down, depressed, irritable, or hopeless Not at all Not at all Not at all Not at all Not at all Not at all   Total Score PHQ 2 0 0 0 0 0 0       Learning Assessment:  Learning Assessment 2/5/2018 12/9/2015   PRIMARY LEARNER Patient Patient   HIGHEST LEVEL OF EDUCATION - PRIMARY LEARNER  - SOME COLLEGE   BARRIERS PRIMARY LEARNER - [address] CAREGIVER - No   PRIMARY [address]    NEED - No   LEARNER PREFERENCE PRIMARY DEMONSTRATION DEMONSTRATION   ANSWERED BY Patient patient   RELATIONSHIP SELF SELF       Abuse Screening:  Abuse Screening Questionnaire 7/18/2016   Do you ever feel afraid of your partner? N   Are you in a relationship with someone who physically or mentally threatens you? N   Is it safe for you to go home? Y       Fall Risk  Fall Risk Assessment, last 12 mths 7/18/2016   Able to walk? Yes   Fall in past 12 months? No         Health Maintenance reviewed and discussed per provider. Pt is due for   Health Maintenance Due   Topic    LIPID PANEL Q1     FOOT EXAM Q1     MICROALBUMIN Q1     EYE EXAM RETINAL OR DILATED Q1     Pneumococcal 19-64 Medium Risk (1 of 1 - PPSV23)    DTaP/Tdap/Td series (1 - Tdap)    Influenza Age 5 to Adult     Please order/place referral if appropriate. Pt currently taking Antiplatelet therapy? no      Coordination of Care:  1. Have you been to the ER, urgent care clinic since your last visit? Hospitalized since your last visit? no    2.  Have you seen or consulted any other health care providers outside of the [address] [place] [address] since your last visit? Include any pap smears or colon screening. no    Please see Red banners under Allergies, Med rec, Immunizations to remove outside inquires. All correct information has been verified with patient and added to chart.

## 2018-10-04 NOTE — PROGRESS NOTES
Elio Chiu is a 56 y.o. male presenting today for Toe Swelling  . HPI:  Elio Chiu presents to the office today for left great toe swelling. Patient reports that he noticed on Tuesday afternoon that he had some left great toe pain. He attempted to use tweezers to clean the area out when he noticed a yellow discharge from the left great toe. He reports that great toe has been swelling and erythematous. He is negative for fever. Review of Systems   Respiratory: Negative for cough and hemoptysis. Cardiovascular: Negative for chest pain and palpitations. Allergies   Allergen Reactions    Iodine Unknown (comments)       Current Outpatient Prescriptions   Medication Sig Dispense Refill    cephALEXin (KEFLEX) 250 mg capsule Take 1 Cap by mouth four (4) times daily for 10 days. 40 Cap 0    metFORMIN (GLUCOPHAGE) 500 mg tablet TAKE 1 TABLET BY MOUTH TWICE DAILY WITH MEALS 60 Tab 1    ibuprofen (MOTRIN) 800 mg tablet Take 1 Tab by mouth every eight (8) hours as needed for Pain. 90 Tab 0    gabapentin (NEURONTIN) 600 mg tablet TAKE ONE TABLET BY MOUTH THREE TIMES DAILY  Indications: NEUROPATHIC PAIN 90 Tab 2    tiZANidine (ZANAFLEX) 2 mg tablet Take 1 Tab by mouth three (3) times daily as needed. 90 Tab 0    telmisartan (MICARDIS) 40 mg tablet Take one tablet by mouth daily (stop valstartan) 90 Tab 3    labetalol (NORMODYNE) 100 mg tablet TAKE 1 TABLET BY MOUTH TWICE DAILY (GENERIC  FOR  NORMODYNE) 180 Tab 1    aspirin delayed-release 81 mg tablet Take  by mouth daily. Past Medical History:   Diagnosis Date    Acute sinusitis     BBB (bundle branch block)     Bursitis disorder     Cardiac cath 07/30/2012    R dom. Patent coronary arteries. LVEDP 15.  RA 10.  RV 32/10. PA 32/18. Mean PASP 22. W 14.  CO 5.7 (TD). High CO & high O2 sats on right raise question of peripheral arterial venous shunting.  Cardiac echocardiogram 06/27/2012    EF 50-55%.   Dyssynergic septal motion c/w LBBB. RVSP/PASP 50-55. Mild LAE. Mild-mod TR. Mild IVCE. 30 x 5-mm echodensity in interventricular groove (poss fibrinous deposit). Sm pericardial effus at apex.  Hx of cardiac cath     Braeden's Daughters at about 12years old.  Hypertension     Obesity     Other ill-defined conditions(799.89)     heart cath 2012    Skin lesion     Umbilical hernia without obstruction or gangrene     Unspecified sleep apnea     no diagnosed       Past Surgical History:   Procedure Laterality Date    HX HEART CATHETERIZATION  07/30/12    HX HEENT      dental extract    HX LUMBAR LAMINECTOMY  02/08/2018    L4 laminectomy, L5 laminectomy       Social History     Social History    Marital status:      Spouse name: N/A    Number of children: N/A    Years of education: N/A     Occupational History    Not on file. Social History Main Topics    Smoking status: Never Smoker    Smokeless tobacco: Never Used    Alcohol use 0.0 oz/week     2 - 3 Standard drinks or equivalent per week      Comment: \"few drinks every evening\"    Drug use: No    Sexual activity: Yes     Partners: Female     Other Topics Concern    Not on file     Social History Narrative       Patient does not have an advanced directive on file    Vitals:    10/04/18 1010   BP: 130/72   Pulse: 76   Resp: 16   Temp: 98 °F (36.7 °C)   TempSrc: Tympanic   SpO2: 98%   Weight: 218 lb (98.9 kg)   Height: 5' 5\" (1.651 m)   PainSc:   3   PainLoc: Toe       Physical Exam   Cardiovascular: Normal rate, regular rhythm and normal heart sounds. Pulmonary/Chest: Effort normal.   Musculoskeletal:        Feet:    Nursing note and vitals reviewed. No visits with results within 3 Month(s) from this visit.   Latest known visit with results is:    Office Visit on 05/15/2018   Component Date Value Ref Range Status    Color (UA POC) 05/15/2018 Yellow   Final    Clarity (UA POC) 05/15/2018 Clear   Final    Glucose (UA POC) 05/15/2018 2+ Negative Final    Bilirubin (UA POC) 05/15/2018 Negative  Negative Final    Ketones (UA POC) 05/15/2018 Negative  Negative Final    Specific gravity (UA POC) 05/15/2018 1.015  1.001 - 1.035 Final    Blood (UA POC) 05/15/2018 Trace  Negative Final    pH (UA POC) 05/15/2018 5.5  4.6 - 8.0 Final    Protein (UA POC) 05/15/2018 Negative  Negative Final    Urobilinogen (UA POC) 05/15/2018 0.2 mg/dL  0.2 - 1 Final    Nitrites (UA POC) 05/15/2018 Negative  Negative Final    Leukocyte esterase (UA POC) 05/15/2018 Negative  Negative Final    Hemoglobin A1c (POC) 05/15/2018 7.2  % Final       .No results found for any visits on 10/04/18. Assessment / Plan:      ICD-10-CM ICD-9-CM    1. Paronychia of great toe, left L03.032 681.11 cephALEXin (KEFLEX) 250 mg capsule   2. Controlled type 2 diabetes mellitus without complication, without long-term current use of insulin (HCC) E11.9 250.00 AMB POC HEMOGLOBIN A1C   3. Essential hypertension I10 401.9      Paronychia -Keflex 250 mg 4 times daily times 10 days  Hypertension-controlled blood pressure today 130/72. Patient instructed he needs to return for regular office visit and fasting labs. Diabetes-hemoglobin A1c ordered today. Hemoglobin A1c is 6.9 today. No medication changes continue normal regimen. Follow-up Disposition:  Return if symptoms worsen or fail to improve. I asked the patient if he  had any questions and answered his  questions. The patient stated that he understands the treatment plan and agrees with the treatment plan    Discussed the patient's BMI with him. The BMI follow up plan is as follows:     dietary management education, guidance, and counseling  encourage exercise  monitor weight  prescribed dietary intake    An After Visit Summary was printed and given to the patient.

## 2018-10-06 LAB
BACTERIA SPEC CULT: ABNORMAL
GRAM STN SPEC: ABNORMAL
GRAM STN SPEC: ABNORMAL
SERVICE CMNT-IMP: ABNORMAL

## 2018-10-09 ENCOUNTER — TELEPHONE (OUTPATIENT)
Dept: INTERNAL MEDICINE CLINIC | Age: 48
End: 2018-10-09

## 2018-10-09 DIAGNOSIS — L03.032 PARONYCHIA OF GREAT TOE, LEFT: Primary | ICD-10-CM

## 2018-10-09 NOTE — TELEPHONE ENCOUNTER
Patient called and stated that his toe is not getting any better. It is actually getting infected on the other side of toe. Please advise at [phone].

## 2018-10-12 ENCOUNTER — HOSPITAL ENCOUNTER (OUTPATIENT)
Dept: PHYSICAL THERAPY | Age: 48
Discharge: HOME | End: 2018-10-12
Payer: COMMERCIAL

## 2018-10-12 PROCEDURE — 97110 THERAPEUTIC EXERCISES: CPT

## 2018-10-12 NOTE — PROGRESS NOTES
PT DAILY TREATMENT NOTE 10-18    Patient Name: Elena Robison  Date:10/12/2018  : 1970  [x]  Patient  Verified  Payor: BLUE CROSS / Plan: [address] [place] [address] / Product Type: PPO /    In time: 5:01  Out time: 5:30  Total Treatment Time (min):  29  Visit #: 5 of 8    Medicare/BCBS Only   Total Timed Codes (min):  29 1:1 Treatment Time:  29       Treatment Area: Other intervertebral disc degeneration, lumbar region [M51.36]  Low back pain [M54.5]    SUBJECTIVE  Pain Level (0-10 scale):  2/10  Any medication changes, allergies to medications, adverse drug reactions, diagnosis change, or new procedure performed?: [x] No    [] Yes (see summary sheet for update)  Subjective functional status/changes:   [] No changes reported  Pt. Reports he is doing pretty good today. He reports he is having less pain    OBJECTIVE    29 min Therapeutic Exercise:  [x] See flow sheet :   Rationale: increase ROM and increase strength to improve the patients ability to increase ease of ambulation           With   [x] TE   [] TA   [] neuro   [] other: Patient Education: [x] Review HEP    [] Progressed/Changed HEP based on:   [] positioning   [] body mechanics   [] transfers   [] heat/ice application    [] other:      Other Objective/Functional Measures:   Pt. Has pain with LTR  He was challenged with 10# resistance during Pau push outs  FOTO: 47 points      Pain Level (0-10 scale) post treatment:  2/10    ASSESSMENT/Changes in Function:  Pt. Is making slow progress towards goals. He is having less pain at work with modified activities. He has no significant change in FOTO score. Patient will continue to benefit from skilled PT services to modify and progress therapeutic interventions, address functional mobility deficits, address ROM deficits, address strength deficits, analyze and address soft tissue restrictions and analyze and cue movement patterns to attain remaining goals.      Progress towards goals / Updated goals: 1. Pt will improve FOTO score by 12 points to 59/100 to show improvement with functional mobility performance. 2. Pt will report no more than 5/10 to improve QOL.     3. Pt will report at least 50% improvement with spasm/cramping of mid and lower back muscle so he can perform job duties and ADLs comfortably. 4. Pt will be independent with HEP to promote self management and comfortable ADLs/amb.     PLAN  []  Upgrade activities as tolerated     [x]  Continue plan of care  []  Update interventions per flow sheet       []  Discharge due to:_  []  Other:_      Phi Roque PT 10/12/2018  5:05 PM    Future Appointments  Date Time Provider Bib Laura   10/22/2018 11:30 AM Oscar Vitale  [address] 9/23/2019 10:00 AM Sonya Sinclair MD [address] [place] [address]

## 2018-10-15 NOTE — TELEPHONE ENCOUNTER
Per Darcie Saenz NP verbal order received fro referral to podiatry for paronychial infection of left great toe, order read back and confirmed. I have attempted to contact this patient by phone with the following results: left message to return my call on answering machine.

## 2018-10-15 NOTE — TELEPHONE ENCOUNTER
Patient stated his foot is so much better he would like to hold off on the podiatry appointment. He will call if he needs it.

## 2018-10-17 ENCOUNTER — TELEPHONE (OUTPATIENT)
Dept: INTERNAL MEDICINE CLINIC | Age: 48
End: 2018-10-17

## 2018-10-17 NOTE — TELEPHONE ENCOUNTER
Patient states he has finished his antibiotic and his foot is starting to flare up again. His appointment with Podiatry is 10/30. He is requesting refill for antibiotic. Please advise.

## 2018-10-19 RX ORDER — DOXYCYCLINE 100 MG/1
100 CAPSULE ORAL 2 TIMES DAILY
Qty: 20 CAP | Refills: 0 | Status: SHIPPED | OUTPATIENT
Start: 2018-10-19 | End: 2018-10-29

## 2018-10-19 NOTE — TELEPHONE ENCOUNTER
Per Ebony Badillo NP verbal order received for Doxycycline 100 mg bid for 10 days, order read ack and confirmed and sent to the pharmacy, Mr Farrah Moser received information about the Prescription

## 2018-10-22 ENCOUNTER — OFFICE VISIT (OUTPATIENT)
Dept: ORTHOPEDIC SURGERY | Age: 48
End: 2018-10-22

## 2018-10-22 VITALS
OXYGEN SATURATION: 98 % | WEIGHT: 220 LBS | TEMPERATURE: 98.4 F | SYSTOLIC BLOOD PRESSURE: 119 MMHG | DIASTOLIC BLOOD PRESSURE: 64 MMHG | RESPIRATION RATE: 18 BRPM | HEIGHT: 65 IN | HEART RATE: 80 BPM | BODY MASS INDEX: 36.65 KG/M2

## 2018-10-22 DIAGNOSIS — G89.29 CHRONIC BILATERAL LOW BACK PAIN WITHOUT SCIATICA: Primary | ICD-10-CM

## 2018-10-22 DIAGNOSIS — Z98.890 S/P LUMBAR LAMINECTOMY: ICD-10-CM

## 2018-10-22 DIAGNOSIS — M54.16 LUMBAR NEURITIS: ICD-10-CM

## 2018-10-22 DIAGNOSIS — M54.50 CHRONIC BILATERAL LOW BACK PAIN WITHOUT SCIATICA: Primary | ICD-10-CM

## 2018-10-22 NOTE — PROGRESS NOTES
Chief complaint/History of Present Illness:  Chief Complaint   Patient presents with    Back Pain     f/u physical therapy     HPI  Gabby Rosenthal is a  56 y.o.  male      HISTORY OF PRESENT ILLNESS:  The patient comes in today now 8 months out from his L4-L5 laminectomy. Last visit he was having severe cramping. We put him in physical therapy. He states that did help. They also got him a TENS unit. They gave him Zanaflex 2 mg. However, he is only taking it about 1 every 1-2 weeks . He states it was giving him sleepiness but he is also not having the severe cramping like he was having, so the therapy did help. He takes Neurontin 600 mg t.i.d. There are days he can only take it twice, as he forgets to take that middle dose. He takes ibuprofen 800 mg about every 2-3 days. He states he only occasionally gets the right leg pain to his midcalf now. He gets numbness in his feet. He is diabetic. His last A1C was 6.9. He is on metformin. He works as an .  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 He feels like the 20 pound lifting restriction can be lifted now, but still does not feel like he can do a lot of bending and twisting or excessive walking. He is a nonsmoker. Denies fever, bowel or bladder dysfunction. PHYSICAL EXAMINATION:  Mr. Deanna Thakur is a 60-year-old male. He is alert and oriented. Normal mood and affect. He has a  full weightbearing, nonantalgic gait. No assistive device. He has 4/5 strength in bilateral lower extremities. He has some pain with hyperextension of the lumbar spine. Negative straight leg raise. ASSESSMENT/PLAN:  This is a patient who is about 8 months out from his L4, L5 laminectomy. He still has some back pain. The right leg pain has gotten better. The muscle cramping has also improved with physical therapy. He does have a TENS unit.       He is going to continue his home exercise program.  We will lift the 20 pound lifting restriction and continue his other restrictions. We will see him back in 4 months. By that time he should be able to return back to work full duty. Review of systems:    Past Medical History:   Diagnosis Date    Acute sinusitis     BBB (bundle branch block)     Bursitis disorder     Cardiac cath 07/30/2012    R dom. Patent coronary arteries. LVEDP 15.  RA 10.  RV 32/10. PA 32/18. Mean PASP 22. W 14.  CO 5.7 (TD). High CO & high O2 sats on right raise question of peripheral arterial venous shunting.  Cardiac echocardiogram 06/27/2012    EF 50-55%. Dyssynergic septal motion c/w LBBB. RVSP/PASP 50-55. Mild LAE. Mild-mod TR. Mild IVCE. 30 x 5-mm echodensity in interventricular groove (poss fibrinous deposit). Sm pericardial effus at apex.  Hx of cardiac cath     Braeden's Daughters at about 12years old.  Hypertension     Obesity     Other ill-defined conditions(799.89)     heart cath 2012    Skin lesion     Umbilical hernia without obstruction or gangrene     Unspecified sleep apnea     no diagnosed     Past Surgical History:   Procedure Laterality Date    HX HEART CATHETERIZATION  07/30/12    HX HEENT      dental extract    HX LUMBAR LAMINECTOMY  02/08/2018    L4 laminectomy, L5 laminectomy     Social History     Socioeconomic History    Marital status:      Spouse name: Not on file    Number of children: Not on file    Years of education: Not on file    Highest education level: Not on file   Social Needs    Financial resource strain: Not on file    Food insecurity - worry: Not on file    Food insecurity - inability: Not on file   [employer] needs - medical: Not on file   [employer] needs - non-medical: Not on file   Occupational History    Not on file   Tobacco Use    Smoking status: Never Smoker    Smokeless tobacco: Never Used   Substance and Sexual Activity    Alcohol use:  Yes     Alcohol/week: 0.0 oz     Types: 2 - 3 Standard drinks or equivalent per week     Comment: \"few drinks every evening\"    Drug use: No    Sexual activity: Yes     Partners: Female   Other Topics Concern    Not on file   Social History Narrative    Not on file     Family History   Problem Relation Age of Onset    Diabetes Mother     Cancer Mother     Cancer Other         lung; grandfather-nos       Physical Exam:  Visit Vitals  /64   Pulse 80   Temp 98.4 °F (36.9 °C)   Resp 18   Ht 5' 5\" (1.651 m)   Wt 220 lb (99.8 kg)   SpO2 98%   BMI 36.61 kg/m²     Pain Scale: 4/10       has been . reviewed and is appropriate        Diagnoses and all orders for this visit:    1. Chronic bilateral low back pain without sciatica    2. S/P lumbar laminectomy    3. Lumbar neuritis            Follow-up Disposition:  Return in about 4 months (around 2/22/2019) for with NP.         We have informed Sophie Huizar to notify us for immediate appointment if he has any worsening neurogical symptoms or if an emergency situation presents, then call 911

## 2018-10-22 NOTE — LETTER
NOTIFICATION OF RETURN TO WORK / SCHOOL 
 
10/22/2018 12:23 PM 
 
Mr. Mala Hoffman [address] [facility] [address] Located within [facility] [address] Antonia Gee To Whom It May Concern: Mala Hoffman Is under the care of [address] [facility]. Mr. Abe Garcia underwent a L4 laminectomy, L5 laminectomy on 2/8/2018. His prognosis is good. He will be able to return to work on 10/23/18 with the following restrictions: Light duty with limited bending, limited twisting, and limited walking (he should not walk extreme distances). These restrictions will be in place until his follow up appointment in 3 months. He will be reevaluated for full duty. If there are questions or concerns please have the patient contact our office. Sincerely, Phoenix Roa NP

## 2018-11-13 ENCOUNTER — OFFICE VISIT (OUTPATIENT)
Dept: ORTHOPEDIC SURGERY | Age: 48
End: 2018-11-13

## 2018-11-13 VITALS
BODY MASS INDEX: 36.82 KG/M2 | HEIGHT: 65 IN | DIASTOLIC BLOOD PRESSURE: 71 MMHG | HEART RATE: 80 BPM | SYSTOLIC BLOOD PRESSURE: 125 MMHG | WEIGHT: 221 LBS | TEMPERATURE: 98.4 F

## 2018-11-13 DIAGNOSIS — M54.50 LUMBAR PAIN: Primary | ICD-10-CM

## 2018-11-13 DIAGNOSIS — M54.50 ACUTE RIGHT-SIDED LOW BACK PAIN WITHOUT SCIATICA: ICD-10-CM

## 2018-11-13 RX ORDER — METHYLPREDNISOLONE 4 MG/1
TABLET ORAL
Qty: 1 DOSE PACK | Refills: 0 | Status: SHIPPED | OUTPATIENT
Start: 2018-11-13 | End: 2018-12-26

## 2018-11-13 NOTE — PROGRESS NOTES
[facility] [place] [address]  [phone] fax    Physical Therapy Discharge Summary    Patient name: Wolfgang Farah Start of Care: 2018   Referral source: Meseret Guzman NP : 1970                          Medical Diagnosis: Other intervertebral disc degeneration, lumbar region [M51.36]  Low back pain [M54.5] Onset Date: about 1 month ago                          Treatment Diagnosis: Mid and LBP with muscle spasm   Prior Hospitalization: see medical history Provider#: [id number]   Medications: Verified on Patient summary List    Comorbidities: heart disease, diabetes, alcohol, HTN, visual impaired, l/s laminectomy (2018)   Prior Level of Function: less pain and mod Ind right after laminectomy, living alone, 1 story house, small step to enter, crawling, climbing, heavy lifting for job      Visits from [place] of Care: 5    Missed Visits: 1    Reporting Period : 2018 to 10-    Summary of Care:  Goal: Pt will improve FOTO score by 12 points to 59/100 to show improvement with functional mobility performance. Status at last note/certification: not met  Status at discharge: not met    Goal: Pt will report no more than 5/10 to improve QOL. Status at last note/certification: not met  Status at discharge: not met    Goal: Pt will report at least 50% improvement with spasm/cramping of mid and lower back muscle so he can perform job duties and ADLs comfortably. Status at last note/certification: not met  Status at discharge: not met    Goal: Pt will be independent with HEP to promote self management and comfortable ADLs/amb. Status at last note/certification: not met  Status at discharge: not met      ASSESSMENT/RECOMMENDATIONS: Pt making limited progress, demonstrates poor motivation and noncompliance.      [x]Discontinue therapy: []Patient has reached or is progressing toward set goals      [x]Patient is non-compliant or has abdicated      []Due to lack of appreciable progress towards set goals    Sanket Mercado, PT 11/13/2018 1:52 PM

## 2018-11-13 NOTE — PROGRESS NOTES
Chief complaint/History of Present Illness:  Chief Complaint   Patient presents with    Back Pain     lower back pain, stiffness      HPI  Elio Chiu is a  53 y.o.  male      HISTORY OF PRESENT ILLNESS:  The patient comes in today stating that 3 days ago, on Saturday, he had some sinus drainage in the morning, coughed really hard, felt instant low back pain on the right side of his low back. He used heat and then was able to go to work, but then on Sunday, it happened again, and he could not go to work. He had to go back and lay down and go to bed. He states that it felt like his back was swollen. It is now a dull pain, and he still feels like it is swollen. Unable to go to work yesterday. He states he still gets numbness in his feet, and he feels like it is sharp, stabbing, pressure pain in his foot. He does see a podiatrist, and he states the podiatrist is sending him for some kind of scan to see what the nerves in his feet look like on 11/21/2018. The patient is diabetic. He is status post L4-5 laminectomy 02/08/2018. 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 PHYSICAL EXAMINATION:  The patient is a 46-year-old male. He is alert and oriented. Normal mood and affect. He has a full weightbearing nonantalgic gait. No assistive device; 4/5 strength bilateral lower extremities. Negative straight leg raise. He has pain with hyperextension of the lumbar spine. He has a tender spot near his SI joint on the right. ASSESSMENT/PLAN:  This is a patient who has had a flare of pain. I did vladislav the point of his pain with a paper clip. We did a lumbar AP and lateral x-ray. It is really not near his SI joint. It is more almost just to the right of the midline, probably more a facet. We are going to give him a Medrol Dosepak to see if we can calm that down. He needs to take it with food. Not to take ibuprofen while he is on it.   Also asked him to try to remember to take his Neurontin t.i.d. If he cannot do that, take 1 in the morning and 2 at night. That will help with nerve pain. He has an upcoming appointment in 02/2019, so we will see him back then. Hopefully, the steroids will calm everything down. We will see him back sooner if needed. Review of systems:    Past Medical History:   Diagnosis Date    Acute sinusitis     BBB (bundle branch block)     Bursitis disorder     Cardiac cath 07/30/2012    R dom. Patent coronary arteries. LVEDP 15.  RA 10.  RV 32/10. PA 32/18. Mean PASP 22. W 14.  CO 5.7 (TD). High CO & high O2 sats on right raise question of peripheral arterial venous shunting.  Cardiac echocardiogram 06/27/2012    EF 50-55%. Dyssynergic septal motion c/w LBBB. RVSP/PASP 50-55. Mild LAE. Mild-mod TR. Mild IVCE. 30 x 5-mm echodensity in interventricular groove (poss fibrinous deposit). Sm pericardial effus at apex.  Hx of cardiac cath     Braeden's Daughters at about 12years old.     Hypertension     Obesity     Other ill-defined conditions(799.89)     heart cath 2012    Skin lesion     Umbilical hernia without obstruction or gangrene     Unspecified sleep apnea     no diagnosed     Past Surgical History:   Procedure Laterality Date    HX HEART CATHETERIZATION  07/30/12    HX HEENT      dental extract    HX LUMBAR LAMINECTOMY  02/08/2018    L4 laminectomy, L5 laminectomy     Social History     Socioeconomic History    Marital status:      Spouse name: Not on file    Number of children: Not on file    Years of education: Not on file    Highest education level: Not on file   Social Needs    Financial resource strain: Not on file    Food insecurity - worry: Not on file    Food insecurity - inability: Not on file   [employer] needs - medical: Not on file   [employer] needs - non-medical: Not on file   Occupational History    Not on file   Tobacco Use    Smoking status: Never Smoker    Smokeless tobacco: Never Used   Substance and Sexual Activity    Alcohol use: Yes     Alcohol/week: 0.0 oz     Types: 2 - 3 Standard drinks or equivalent per week     Comment: \"few drinks every evening\"    Drug use: No    Sexual activity: Yes     Partners: Female   Other Topics Concern    Not on file   Social History Narrative    Not on file     Family History   Problem Relation Age of Onset    Diabetes Mother     Cancer Mother     Cancer Other         lung; grandfather-nos       Physical Exam:  Visit Vitals  /71   Pulse 80   Temp 98.4 °F (36.9 °C) (Oral)   Ht 5' 5\" (1.651 m)   Wt 221 lb (100.2 kg)   BMI 36.78 kg/m²     Pain Scale: 5/10       has been . reviewed and is appropriate          Diagnoses and all orders for this visit:    1. Lumbar pain  -     AMB POC XRAY, SPINE, LUMBOSACRAL; 2 O    2. Acute right-sided low back pain without sciatica  -     methylPREDNISolone (MEDROL, RUDY,) 4 mg tablet; Per dose pack instructions            Follow-up Disposition:  Return for has f/u in Feb   sooner if needed.         We have informed Keysha Miller to notify us for immediate appointment if he has any worsening neurogical symptoms or if an emergency situation presents, then call [phone]

## 2018-11-13 NOTE — LETTER
NOTIFICATION OF RETURN TO WORK / SCHOOL 
 
11/13/2018 11:11 AM 
 
Mr. Miguel Minaya [address] [address] [facility] [place] To Whom It May Concern: Miguel Minaya is under the care of [facility]. He was seen in our office today for continuing care. If there are questions or concerns please have the patient contact our office. Sincerely, Jaren Izquierdo NP

## 2018-11-21 ENCOUNTER — OFFICE VISIT (OUTPATIENT)
Dept: NEUROLOGY | Age: 48
End: 2018-11-21

## 2018-11-21 DIAGNOSIS — M54.16 LUMBAR RADICULOPATHY: Primary | ICD-10-CM

## 2018-11-21 DIAGNOSIS — D17.79 EPIDURAL LIPOMATOSIS: ICD-10-CM

## 2018-11-21 DIAGNOSIS — E11.42 DIABETIC POLYNEUROPATHY ASSOCIATED WITH TYPE 2 DIABETES MELLITUS (HCC): ICD-10-CM

## 2018-11-21 DIAGNOSIS — M54.16 LUMBAR NEURITIS: ICD-10-CM

## 2018-11-21 NOTE — PROGRESS NOTES
Ally aCnela is a 53 y.o., right handed male, with an established history of diabetes, lumbar spine surgery back in February of this year, who comes in with complaints of a burning and stinging sensation on the bottom of his feet. He says that he feels pressure on the bottom of his feet when he walks. He also has numbness on the bottom of his feet from the big toe up to about the midcalf. Current Outpatient Medications   Medication Sig Dispense Refill    methylPREDNISolone (MEDROL, RUDY,) 4 mg tablet Per dose pack instructions 1 Dose Pack 0    metFORMIN (GLUCOPHAGE) 500 mg tablet TAKE 1 TABLET BY MOUTH TWICE DAILY WITH MEALS 60 Tab 1    ibuprofen (MOTRIN) 800 mg tablet Take 1 Tab by mouth every eight (8) hours as needed for Pain. 90 Tab 0    gabapentin (NEURONTIN) 600 mg tablet TAKE ONE TABLET BY MOUTH THREE TIMES DAILY  Indications: NEUROPATHIC PAIN 90 Tab 2    tiZANidine (ZANAFLEX) 2 mg tablet Take 1 Tab by mouth three (3) times daily as needed. 90 Tab 0    telmisartan (MICARDIS) 40 mg tablet Take one tablet by mouth daily (stop valstartan) 90 Tab 3    labetalol (NORMODYNE) 100 mg tablet TAKE 1 TABLET BY MOUTH TWICE DAILY (GENERIC  FOR  NORMODYNE) 180 Tab 1    aspirin delayed-release 81 mg tablet Take  by mouth daily. Past Medical History:   Diagnosis Date    Acute sinusitis     BBB (bundle branch block)     Bursitis disorder     Cardiac cath 07/30/2012    R dom. Patent coronary arteries. LVEDP 15.  RA 10.  RV 32/10. PA 32/18. Mean PASP 22. W 14.  CO 5.7 (TD). High CO & high O2 sats on right raise question of peripheral arterial venous shunting.  Cardiac echocardiogram 06/27/2012    EF 50-55%. Dyssynergic septal motion c/w LBBB. RVSP/PASP 50-55. Mild LAE. Mild-mod TR. Mild IVCE. 30 x 5-mm echodensity in interventricular groove (poss fibrinous deposit). Sm pericardial effus at apex.  Hx of cardiac cath     Braeden's Daughters at about 12years old.     Hypertension     Obesity     Other ill-defined conditions(799.89)     heart cath 2012    Skin lesion     Umbilical hernia without obstruction or gangrene     Unspecified sleep apnea     no diagnosed       Past Surgical History:   Procedure Laterality Date    HX HEART CATHETERIZATION  07/30/12    HX HEENT      dental extract    HX LUMBAR LAMINECTOMY  02/08/2018    L4 laminectomy, L5 laminectomy       Allergies   Allergen Reactions    Iodine Unknown (comments)       Patient Active Problem List   Diagnosis Code    Pulmonary hypertension ([facility] [address] I27.20    Skin lesion L98.9    Bursitis disorder X45.0    Umbilical hernia without obstruction or gangrene K42.9    Reflux K21.9    Gastroesophageal reflux disease without esophagitis K21.9    Palpitations R00.2    Right lower quadrant abdominal pain R10.31    Essential hypertension I10    LBBB (left bundle branch block) I44.7    Spinal stenosis, lumbar region with neurogenic claudication M48.062    Lumbar neuritis M54.16    DDD (degenerative disc disease), lumbar M51.36    Lumbar radiculopathy M54.16    Epidural lipomatosis D17.79    Cauda equina compression (HCC) G83.4    S/P lumbar laminectomy Z98.890    Severe obesity (BMI 35.0-39. 9) with comorbidity (HCC) E66.01    Myofacial muscle pain M79.18    Chronic bilateral low back pain without sciatica M54.5, G89.29    Controlled type 2 diabetes mellitus without complication, without long-term current use of insulin (HCC) E11.9    Obesity (BMI 35.0-39.9 without comorbidity) E66.9         Review of Systems: He does complain of chronic low back pain. [facility].   As above otherwise 11 point review of systems negative including;   Constitutional no fever or chills  Skin denies rash or itching  HENT  Denies tinnitus, hearing lose  Eyes denies diplopia vision lose  Respiratory denies shortness of breath  Cardiovascular denies chest pain, dyspnea on exertion  Gastrointestinal denies nausea, vomiting, diarrhea, constipation  Genitourinary denies incontinence  Musculoskeletal denies joint pain or swelling  Endocrine denies weight change  Hematology denies easy bruising or bleeding   Neurological as above in HPI      PHYSICAL EXAMINATION:      VITAL SIGNS:  There were no vitals taken for this visit. GENERAL: The patient is well developed, well nourished, and in no apparent distress. EXTREMITIES: No clubbing, cyanosis, or edema is identified. Pulses 2+ and symmetrical.  Muscle tone is normal.  HEAD:   Ear, nose, and throat appear to be without trauma. The patient is normocephalic. NEUROLOGIC EXAMINATION    MENTAL STATUS: The patient is awake, alert, and oriented x 4. Fund of knowledge is adequate. Speech is fluent and memory appears to be intact, both long and short term. CRANIAL NERVES: II - Visual fields are full to confrontation. Funduscopic examination reveals flat disks bilaterally. Pupils are both 4 mm and briskly reactive to light and accommodation. III, IV, VI - Extraocular movements are intact and there is no nystagmus. V - Facial sensation is intact to pinprick and light touch. VII - Face is symmetrical.   VIII - Hearing is present. IX, X, XII- Palate rises symmetrically. Gag is present. Tongue is in the midline. XI - Shoulder shrugging and head turning intact  MOTOR:  The patient is 5/5 in all four limbs without any drift. Fine finger movements are symmetrical.  Isolated motor group testing reveals no focal abnormalities. Tone is normal.  Sensory examination is depressed to pinprick and light touch sensation to about the midcalf on the right side and just above the ankle on the left side. Reflexes are 2+ and symmetrical. Plantars are down going. Cerebellar examination reveals no gross ataxia or dysmetria. Gait is normal and the patient can tandem walk without any difficulty.         CBC:   Lab Results   Component Value Date/Time    WBC 6.9 02/03/2018 11:45 AM RBC 5.01 02/03/2018 11:45 AM    HGB 15.2 02/03/2018 11:45 AM    HCT 42.8 02/03/2018 11:45 AM    PLATELET 675 91/43/2890 11:45 AM     BMP:   Lab Results   Component Value Date/Time    Glucose 178 (H) 02/03/2018 11:45 AM    Sodium 136 02/03/2018 11:45 AM    Potassium 4.2 02/03/2018 11:45 AM    Chloride 100 02/03/2018 11:45 AM    CO2 31 02/03/2018 11:45 AM    BUN 12 02/03/2018 11:45 AM    Creatinine 0.86 02/03/2018 11:45 AM    Calcium 9.4 02/03/2018 11:45 AM     CMP:   Lab Results   Component Value Date/Time    Glucose 178 (H) 02/03/2018 11:45 AM    Sodium 136 02/03/2018 11:45 AM    Potassium 4.2 02/03/2018 11:45 AM    Chloride 100 02/03/2018 11:45 AM    CO2 31 02/03/2018 11:45 AM    BUN 12 02/03/2018 11:45 AM    Creatinine 0.86 02/03/2018 11:45 AM    Calcium 9.4 02/03/2018 11:45 AM    Anion gap 5 02/03/2018 11:45 AM    BUN/Creatinine ratio 14 02/03/2018 11:45 AM    Alk.  phosphatase 75 02/03/2018 11:45 AM    Protein, total 7.2 02/03/2018 11:45 AM    Albumin 4.3 02/03/2018 11:45 AM    Globulin 2.9 02/03/2018 11:45 AM    A-G Ratio 1.5 02/03/2018 11:45 AM     Coagulation:   Lab Results   Component Value Date/Time    Prothrombin time 12.1 07/23/2012 12:29 PM    INR 0.9 07/23/2012 12:29 PM     Cardiac markers:   Lab Results   Component Value Date/Time    CK 83 11/27/2014 04:37 PM    CK-MB Index 1.2 11/27/2014 04:37 PM            [facility]  OFFICE PROCEDURE PROGRESS NOTE        Chart reviewed for the following:   Azalea Salas MD, have reviewed the History, Physical and updated the Allergic reactions for Princeton Dakin     TIME OUT performed immediately prior to start of procedure:   Azalea Salas MD, have performed the following reviews on Princeton Dakin prior to the start of the procedure:            * Patient was identified by name and date of birth   * Agreement on procedure being performed was verified  * Risks and Benefits explained to the patient  * Procedure site verified and marked as necessary  * Patient was positioned for comfort  * Consent was signed and verified     Time: 11:16 AM    Date of procedure: 11/21/2018    Procedure performed by:  Urvashi Mao MD    Provider assisted by: Vy Huang MA    Patient assisted by: self    How tolerated by patient: tolerated the procedure well with no complications    Post Procedural Pain Scale: 0 - No Hurt    Comments: none          [facility]  [facility] [address], [address]  [phone]      [facility] [address]    Neurophysiology Report      Patient: Becca Lacy     ID: [id number] Physician: Kaye Thakkar MD   Gender: Male Ref Phys: Princess Aylin MD   Handedness: Vy Huang     Study Date: November 21, 2018       Nerve Conduction Studies  Anti Sensory Summary Table     Stim Site NR Peak (ms) Norm Peak (ms) O-P Amp (µV) Norm O-P Amp Dist (cm) Villa (m/s)   Left Sural Anti Sensory (Ankle)   Calf    4.4 <4.4 4.4 >6.0 14.0 42.4   Site 2    4.3  1.9      Right Sural Anti Sensory (Ankle)   Calf    3.8 <4.4 3.7 >6.0 14.0 48.3   Site 2    3.9  3.8        Motor Summary Table     Stim Site NR Onset (ms) Norm Onset (ms) O-P Amp (mV) Norm O-P Amp Dist (cm) Villa (m/s) Norm Villa (m/s)   Left Peroneal Motor (EDB)   Ankle    4.8 <6.5 5.1 >2.0 35.0 54.7 >44   Fibula (Head)    11.2  6.0  10.0 83.3    Pop Fossa    12.4  3.3       Right Peroneal Motor (EDB)   Ankle    4.5 <6.5 3.8 >2.0 35.0 50.7 >44   Fibula (Head)    11.4  5.8  10.0 100.0    Pop Fossa    12.4  12.0       Left Tibial Motor (Abd Padilla Brev)   Ankle    5.7 <5.8 10.9 >4.0 44.0 55.7 >41   Knee    13.6  9.3       Right Tibial Motor (Abd Padilla Brev)   Ankle    4.1 <5.8 11.9 >4.0 45.0 45.9 >41   Knee    13.9  6.5           EMG     Side Muscle Nerve Root Ins Act Fibs Psw Fasc Amp Dur Poly Recrt Int [employer] Comment   Right AntTibialis Dp Br Fibular L4-5 Nml Nml Nml None Nml Nml 0 Nml Nml    Right MedGastroc   Nml Nml Nml None Nml Nml 0 Nml Nml    Right ExtHallLong Dp Br Fibular L5, S1 Nml Nml Nml None Nml Nml 0 Nml Nml    Right VastusMed Femoral L2-4 Nml Nml Nml None Nml Nml 0 Nml Nml    Right BicepsFemS Sciatic L5-S1 Nml Nml Nml None Nml Nml 0 Nml Nml    Right Lumbo Parasp Up Rami L1-2 Nml Nml Nml          Right Lumbo Parasp Mid Rami L3-4 Nml 3+ 3+          Right Lumbo Parasp Low Rami L5-S1 Nml Nml Nml            NCS/EMG FINDINGS:     Evaluation of the Left peroneal motor, the Right peroneal motor, the Left tibial motor, the Right tibial motor, the Left sural sensory, and the Right sural sensory nerves were unremarkable. INTERPRETATION: This was an abnormal nerve conduction and EMG study showing;     (1) signs of a mild sensory neuropathy in both lower extremities. This can be consistent with diabetic neuropathy. (2) denervation in the mid lumbar paraspinals on the left side consistent with a radiculopathy in that region. ___________________________  Marialuisa Rangel MD          Waveforms:                  Impression: But seems to be an early sensory neuropathy in this man who has risk factors including diabetes. 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 Also seen with some denervation in the mid lumbar paraspinals which probably as a consequence of his lumbar spine disease. Plan: Symptomatic treatment of his neuropathy. Also needs to have tight control of his sugars. PLEASE NOTE:   This document has been produced using voice recognition software. Unrecognized errors in transcription may be present.

## 2018-11-25 DIAGNOSIS — E11.21 TYPE II DIABETES MELLITUS WITH NEPHROPATHY (HCC): ICD-10-CM

## 2018-11-26 RX ORDER — IBUPROFEN 800 MG/1
TABLET ORAL
Qty: 90 TAB | Refills: 0 | Status: SHIPPED | OUTPATIENT
Start: 2018-11-26 | End: 2019-04-04 | Stop reason: SDUPTHER

## 2018-11-26 RX ORDER — TIZANIDINE 2 MG/1
TABLET ORAL
Qty: 90 TAB | Refills: 0 | Status: SHIPPED | OUTPATIENT
Start: 2018-11-26 | End: 2020-03-05 | Stop reason: SDUPTHER

## 2018-11-26 RX ORDER — METFORMIN HYDROCHLORIDE 500 MG/1
TABLET ORAL
Qty: 60 TAB | Refills: 1 | Status: SHIPPED | OUTPATIENT
Start: 2018-11-26 | End: 2019-01-30 | Stop reason: SDUPTHER

## 2018-11-26 NOTE — TELEPHONE ENCOUNTER
Requested Prescriptions     Pending Prescriptions Disp Refills    metFORMIN (GLUCOPHAGE) 500 mg tablet [Pharmacy Med Name: METFORMIN 500MG TAB] 60 Tab 1     Sig: TAKE 1 TABLET BY MOUTH TWICE DAILY WITH MEALS

## 2018-12-26 ENCOUNTER — OFFICE VISIT (OUTPATIENT)
Dept: INTERNAL MEDICINE CLINIC | Age: 48
End: 2018-12-26

## 2018-12-26 ENCOUNTER — HOSPITAL ENCOUNTER (OUTPATIENT)
Dept: GENERAL RADIOLOGY | Age: 48
Discharge: HOME | End: 2018-12-26
Payer: COMMERCIAL

## 2018-12-26 VITALS
HEIGHT: 65 IN | SYSTOLIC BLOOD PRESSURE: 120 MMHG | DIASTOLIC BLOOD PRESSURE: 80 MMHG | WEIGHT: 226.4 LBS | RESPIRATION RATE: 18 BRPM | BODY MASS INDEX: 37.72 KG/M2 | OXYGEN SATURATION: 97 % | HEART RATE: 78 BPM | TEMPERATURE: 98 F

## 2018-12-26 DIAGNOSIS — I10 ESSENTIAL HYPERTENSION: ICD-10-CM

## 2018-12-26 DIAGNOSIS — R06.00 DYSPNEA, UNSPECIFIED TYPE: ICD-10-CM

## 2018-12-26 DIAGNOSIS — J22 LOWER RESPIRATORY INFECTION: Primary | ICD-10-CM

## 2018-12-26 DIAGNOSIS — J22 LOWER RESPIRATORY INFECTION: ICD-10-CM

## 2018-12-26 DIAGNOSIS — R07.81 RIB PAIN: ICD-10-CM

## 2018-12-26 PROCEDURE — 71046 X-RAY EXAM CHEST 2 VIEWS: CPT

## 2018-12-26 RX ORDER — TRAMADOL HYDROCHLORIDE 50 MG/1
50 TABLET ORAL
Qty: 24 TAB | Refills: 1 | Status: SHIPPED | OUTPATIENT
Start: 2018-12-26 | End: 2020-10-13

## 2018-12-26 RX ORDER — PREDNISONE 10 MG/1
TABLET ORAL
Qty: 20 TAB | Refills: 0 | Status: SHIPPED | OUTPATIENT
Start: 2018-12-26 | End: 2019-07-23 | Stop reason: ALTCHOICE

## 2018-12-26 NOTE — PATIENT INSTRUCTIONS
Body Mass Index: Care Instructions  Your Care Instructions    Body mass index (BMI) can help you see if your weight is raising your risk for health problems. It uses a formula to compare how much you weigh with how tall you are. · A BMI lower than 18.5 is considered underweight. · A BMI between 18.5 and 24.9 is considered healthy. · A BMI between 25 and 29.9 is considered overweight. A BMI of 30 or higher is considered obese. If your BMI is in the normal range, it means that you have a lower risk for weight-related health problems. If your BMI is in the overweight or obese range, you may be at increased risk for weight-related health problems, such as high blood pressure, heart disease, stroke, arthritis or joint pain, and diabetes. If your BMI is in the underweight range, you may be at increased risk for health problems such as fatigue, lower protection (immunity) against illness, muscle loss, bone loss, hair loss, and hormone problems. BMI is just one measure of your risk for weight-related health problems. You may be at higher risk for health problems if you are not active, you eat an unhealthy diet, or you drink too much alcohol or use tobacco products. Follow-up care is a key part of your treatment and safety. Be sure to make and go to all appointments, and call your doctor if you are having problems. It's also a good idea to know your test results and keep a list of the medicines you take. How can you care for yourself at home? · Practice healthy eating habits. This includes eating plenty of fruits, vegetables, whole grains, lean protein, and low-fat dairy. · If your doctor recommends it, get more exercise. Walking is a good choice. Bit by bit, increase the amount you walk every day. Try for at least 30 minutes on most days of the week. · Do not smoke. Smoking can increase your risk for health problems. If you need help quitting, talk to your doctor about stop-smoking programs and medicines. These can increase your chances of quitting for good. · Limit alcohol to 2 drinks a day for men and 1 drink a day for women. Too much alcohol can cause health problems. If you have a BMI higher than 25  · Your doctor may do other tests to check your risk for weight-related health problems. This may include measuring the distance around your waist. A waist measurement of more than 40 inches in men or 35 inches in women can increase the risk of weight-related health problems. · Talk with your doctor about steps you can take to stay healthy or improve your health. You may need to make lifestyle changes to lose weight and stay healthy, such as changing your diet and getting regular exercise. If you have a BMI lower than 18.5  · Your doctor may do other tests to check your risk for health problems. · Talk with your doctor about steps you can take to stay healthy or improve your health. You may need to make lifestyle changes to gain or maintain weight and stay healthy, such as getting more healthy foods in your diet and doing exercises to build muscle. Where can you learn more? Go to http://marquise-nehemias.info/. Enter S176 in the search box to learn more about \"Body Mass Index: Care Instructions. \"  Current as of: October 13, 2016  Content Version: 11.4  © 7244-5002 [employer]. Care instructions adapted under license by [employer] (which disclaims liability or warranty for this information). If you have questions about a medical condition or this instruction, always ask your healthcare professional. [address] any warranty or liability for your use of this information.

## 2018-12-26 NOTE — PROGRESS NOTES
Chief Complaint   Patient presents with    Sinus Pain    Rib Pain    Cough         Is someone accompanying this pt? No      Is the patient using any DME equipment during OV? no    Depression Screening:  PHQ over the last two weeks 11/13/2018 10/4/2018 9/19/2018 5/15/2018 12/11/2017 12/12/2016 12/9/2015   Little interest or pleasure in doing things Not at all Not at all Not at all Not at all Not at all Not at all Not at all   Feeling down, depressed, irritable, or hopeless Not at all Not at all Not at all Not at all Not at all Not at all Not at all   Total Score PHQ 2 0 0 0 0 0 0 0       Learning Assessment:  Learning Assessment 2/5/2018 12/9/2015   PRIMARY LEARNER Patient Patient   HIGHEST LEVEL OF EDUCATION - PRIMARY LEARNER  - SOME COLLEGE   BARRIERS PRIMARY LEARNER - [address] CAREGIVER - No   PRIMARY [address]    NEED - No   LEARNER PREFERENCE PRIMARY DEMONSTRATION DEMONSTRATION   ANSWERED BY Patient patient   RELATIONSHIP SELF SELF       Abuse Screening:  Abuse Screening Questionnaire 7/18/2016   Do you ever feel afraid of your partner? N   Are you in a relationship with someone who physically or mentally threatens you? N   Is it safe for you to go home? Y       Fall Risk  Fall Risk Assessment, last 12 mths 7/18/2016   Able to walk? Yes   Fall in past 12 months? No         Health Maintenance reviewed and discussed per provider. Pt is due for   Health Maintenance Due   Topic    LIPID PANEL Q1     FOOT EXAM Q1     MICROALBUMIN Q1     EYE EXAM RETINAL OR DILATED     Pneumococcal 19-64 Medium Risk (1 of 1 - PPSV23)    DTaP/Tdap/Td series (1 - Tdap)    Influenza Age 5 to Adult     Please order/place referral if appropriate. Pt currently taking Antiplatelet therapy? no      Coordination of Care:  1. Have you been to the ER, urgent care clinic since your last visit? Hospitalized since your last visit? no    2.  Have you seen or consulted any other health care providers outside of the [address] [place] [address] [place] since your last visit? Include any pap smears or colon screening. no    Please see Red banners under Allergies, Med rec, Immunizations to remove outside inquires. All correct information has been verified with patient and added to chart.

## 2018-12-26 NOTE — PROGRESS NOTES
Pardeep Guerra is a 53 y.o. male presenting today for Sinus Pain; Rib Pain; and Cough  . HPI:  Pardeep Guerra presents to the office today for a productive cough and sinus pain greater than 1 week. Patient reports about 3-4 days he was coughing and heard a pop and is complaining of left anterior rib pain. Patient reports that the pain is worsening due to his increased coughing. Patient denies any fever but is complaining of wheezing and mild dyspnea when lying flat. He is negative for sore throat or headache. He is complaining of sinus pain. Patient reports that he is currently takiing Cephalexin from the podiatrist, for left great toe infection. Review of Systems   Respiratory: Positive for cough, sputum production, shortness of breath and wheezing. Cardiovascular: Negative for chest pain and palpitations. Musculoskeletal: Positive for joint pain ( Left great toe). Allergies   Allergen Reactions    Iodine Unknown (comments)       Current Outpatient Medications   Medication Sig Dispense Refill    traMADol (ULTRAM) 50 mg tablet Take 1 Tab by mouth every six (6) hours as needed for Pain.  Max Daily Amount: 200 mg. 24 Tab 1    predniSONE (DELTASONE) 10 mg tablet 3 tabs daily x 3 days, 2 tabs daily x 3 days, 1 tab daily x 3 days, 1/2 tab daily x 3 days 20 Tab 0    metFORMIN (GLUCOPHAGE) 500 mg tablet TAKE 1 TABLET BY MOUTH TWICE DAILY WITH MEALS 60 Tab 1    ibuprofen (MOTRIN) 800 mg tablet TAKE 1 TABLET BY MOUTH EVERY 8 HOURS AS NEEDED FOR PAIN 90 Tab 0    tiZANidine (ZANAFLEX) 2 mg tablet TAKE 1 TABLET BY MOUTH THREE TIMES DAILY AS NEEDED 90 Tab 0    gabapentin (NEURONTIN) 600 mg tablet TAKE ONE TABLET BY MOUTH THREE TIMES DAILY  Indications: NEUROPATHIC PAIN 90 Tab 2    telmisartan (MICARDIS) 40 mg tablet Take one tablet by mouth daily (stop valstartan) 90 Tab 3    labetalol (NORMODYNE) 100 mg tablet TAKE 1 TABLET BY MOUTH TWICE DAILY (GENERIC  FOR  NORMODYNE) 180 Tab 1    aspirin delayed-release 81 mg tablet Take  by mouth daily. Past Medical History:   Diagnosis Date    Acute sinusitis     BBB (bundle branch block)     Bursitis disorder     Cardiac cath 07/30/2012    R dom. Patent coronary arteries. LVEDP 15.  RA 10.  RV 32/10. PA 32/18. Mean PASP 22. W 14.  CO 5.7 (TD). High CO & high O2 sats on right raise question of peripheral arterial venous shunting.  Cardiac echocardiogram 06/27/2012    EF 50-55%. Dyssynergic septal motion c/w LBBB. RVSP/PASP 50-55. Mild LAE. Mild-mod TR. Mild IVCE. 30 x 5-mm echodensity in interventricular groove (poss fibrinous deposit). Sm pericardial effus at apex.  Hx of cardiac cath     Braeden's Daughters at about 12years old.  Hypertension     Obesity     Other ill-defined conditions(799.89)     heart cath 2012    Skin lesion     Umbilical hernia without obstruction or gangrene     Unspecified sleep apnea     no diagnosed       Past Surgical History:   Procedure Laterality Date    HX HEART CATHETERIZATION  07/30/12    HX HEENT      dental extract    HX LUMBAR LAMINECTOMY  02/08/2018    L4 laminectomy, L5 laminectomy       Social History     Socioeconomic History    Marital status:      Spouse name: Not on file    Number of children: Not on file    Years of education: Not on file    Highest education level: Not on file   Social Needs    Financial resource strain: Not on file    Food insecurity - worry: Not on file    Food insecurity - inability: Not on file   [employer] needs - medical: Not on file   [employer] needs - non-medical: Not on file   Occupational History    Not on file   Tobacco Use    Smoking status: Never Smoker    Smokeless tobacco: Never Used   Substance and Sexual Activity    Alcohol use:  Yes     Alcohol/week: 0.0 oz     Types: 2 - 3 Standard drinks or equivalent per week     Comment: \"few drinks every evening\"    Drug use: No    Sexual activity: Yes     Partners: Female Other Topics Concern    Not on file   Social History Narrative    Not on file       Patient does not have an advanced directive on file    Vitals:    12/26/18 1013   BP: 120/80   Pulse: 78   Resp: 18   Temp: 98 °F (36.7 °C)   TempSrc: Tympanic   SpO2: 97%   Weight: 226 lb 6.4 oz (102.7 kg)   Height: 5' 5\" (1.651 m)   PainSc:   5   PainLoc: Rib Cage       Physical Exam   Cardiovascular: Normal rate, regular rhythm and normal heart sounds. Pulmonary/Chest: No respiratory distress. He has wheezes. He has rhonchi in the right upper field, the right middle field, the right lower field, the left upper field, the left middle field and the left lower field. Nursing note and vitals reviewed. Hospital Outpatient Visit on 10/04/2018   Component Date Value Ref Range Status    Special Requests: 10/04/2018 NO SPECIAL REQUESTS    Final    GRAM STAIN 10/04/2018 NO ORGANISMS SEEN    Final    GRAM STAIN 10/04/2018 NO WBC'S SEEN    Final    Culture result: 10/04/2018 FEW STAPHYLOCOCCUS SPECIES, COAGULASE NEGATIVE (TWO MORPHOTYPES)*   Final   Office Visit on 10/04/2018   Component Date Value Ref Range Status    Hemoglobin A1c (POC) 10/04/2018 6.9  % Final       .No results found for any visits on 12/26/18. Assessment / Plan:      ICD-10-CM ICD-9-CM    1. Lower respiratory infection J22 519.8 XR CHEST PA LAT      predniSONE (DELTASONE) 10 mg tablet   2. Essential hypertension I10 401.9 predniSONE (DELTASONE) 10 mg tablet   3. Rib pain R07.81 786.50 traMADol (ULTRAM) 50 mg tablet   4. Dyspnea, unspecified type R06.00 786.09      Checks x-ray today for cough and rib pain  Tramadol times 6 days for rib pain  Prednisone taper dose given  F/u in 1 week  F/u prn      Follow-up Disposition:  Return if symptoms worsen or fail to improve. I asked the patient if he  had any questions and answered his  questions.   The patient stated that he understands the treatment plan and agrees with the treatment plan                Discussed the patient's BMI with him. The BMI follow up plan is as follows:     dietary management education, guidance, and counseling  encourage exercise  monitor weight  prescribed dietary intake    An After Visit Summary was printed and given to the patient.

## 2018-12-27 NOTE — PROGRESS NOTES
Please notify patient that lab results were reviewed and the results are normal.  If rib pain remains after the dose of prednisone will do rib x-rays.

## 2018-12-28 ENCOUNTER — TELEPHONE (OUTPATIENT)
Dept: INTERNAL MEDICINE CLINIC | Age: 48
End: 2018-12-28

## 2019-01-04 NOTE — TELEPHONE ENCOUNTER
Mr Lesa Butt contacted he stated he is still having some pain but it has improved and he would like to Portuguese prednisone before having another x-ray. He will contact office next week if he is still in pain.

## 2019-01-10 RX ORDER — LABETALOL 100 MG/1
TABLET, FILM COATED ORAL
Qty: 180 TAB | Refills: 1 | Status: SHIPPED | OUTPATIENT
Start: 2019-01-10 | End: 2019-08-11 | Stop reason: SDUPTHER

## 2019-01-15 ENCOUNTER — OFFICE VISIT (OUTPATIENT)
Dept: INTERNAL MEDICINE CLINIC | Age: 49
End: 2019-01-15

## 2019-01-15 VITALS
WEIGHT: 222 LBS | RESPIRATION RATE: 16 BRPM | OXYGEN SATURATION: 96 % | BODY MASS INDEX: 36.99 KG/M2 | DIASTOLIC BLOOD PRESSURE: 80 MMHG | TEMPERATURE: 97.8 F | HEIGHT: 65 IN | HEART RATE: 83 BPM | SYSTOLIC BLOOD PRESSURE: 130 MMHG

## 2019-01-15 DIAGNOSIS — M54.16 LUMBAR RADICULOPATHY: Primary | ICD-10-CM

## 2019-01-15 DIAGNOSIS — I10 ESSENTIAL HYPERTENSION: ICD-10-CM

## 2019-01-15 NOTE — PROGRESS NOTES
Ashlyn Weiss is a 53 y.o. male presenting today for Back Pain  . HPI:  Ashlyn Weiss presents to the office today for lumbar pain. Patient reports over the last 2 days his back has been locking up. He reports his pain at a 4 out of 10. Patient reports that his lumbar pain had improved post surgery but feels like his symptoms are returning. He denies any radiculopathy. Patient is also complaining of a sensation of something pulling to the left of the lumbar incision. Patient reports the pain is worse in the a.m. when he tries to get about the bed. He notes the pain has caused him to miss several days at work. He denies any saddle anesthesia. Review of Systems   Respiratory: Negative for cough. Cardiovascular: Negative for chest pain and palpitations. Musculoskeletal: Positive for back pain. Allergies   Allergen Reactions    Iodine Unknown (comments)       Current Outpatient Medications   Medication Sig Dispense Refill    labetalol (NORMODYNE) 100 mg tablet TAKE 1 TABLET BY MOUTH TWICE DAILY ([address]). 180 Tab 1    traMADol (ULTRAM) 50 mg tablet Take 1 Tab by mouth every six (6) hours as needed for Pain. Max Daily Amount: 200 mg. 24 Tab 1    metFORMIN (GLUCOPHAGE) 500 mg tablet TAKE 1 TABLET BY MOUTH TWICE DAILY WITH MEALS 60 Tab 1    ibuprofen (MOTRIN) 800 mg tablet TAKE 1 TABLET BY MOUTH EVERY 8 HOURS AS NEEDED FOR PAIN 90 Tab 0    tiZANidine (ZANAFLEX) 2 mg tablet TAKE 1 TABLET BY MOUTH THREE TIMES DAILY AS NEEDED 90 Tab 0    gabapentin (NEURONTIN) 600 mg tablet TAKE ONE TABLET BY MOUTH THREE TIMES DAILY  Indications: NEUROPATHIC PAIN 90 Tab 2    telmisartan (MICARDIS) 40 mg tablet Take one tablet by mouth daily (stop valstartan) 90 Tab 3    aspirin delayed-release 81 mg tablet Take  by mouth daily.       predniSONE (DELTASONE) 10 mg tablet 3 tabs daily x 3 days, 2 tabs daily x 3 days, 1 tab daily x 3 days, 1/2 tab daily x 3 days 20 Tab 0       Past Medical History:   Diagnosis Date    Acute sinusitis     BBB (bundle branch block)     Bursitis disorder     Cardiac cath 07/30/2012    R dom. Patent coronary arteries. LVEDP 15.  RA 10.  RV 32/10. PA 32/18. Mean PASP 22. W 14.  CO 5.7 (TD). High CO & high O2 sats on right raise question of peripheral arterial venous shunting.  Cardiac echocardiogram 06/27/2012    EF 50-55%. Dyssynergic septal motion c/w LBBB. RVSP/PASP 50-55. Mild LAE. Mild-mod TR. Mild IVCE. 30 x 5-mm echodensity in interventricular groove (poss fibrinous deposit). Sm pericardial effus at apex.  Hx of cardiac cath     Braeden's Daughters at about 12years old.  Hypertension     Obesity     Other ill-defined conditions(799.89)     heart cath 2012    Skin lesion     Umbilical hernia without obstruction or gangrene     Unspecified sleep apnea     no diagnosed       Past Surgical History:   Procedure Laterality Date    HX HEART CATHETERIZATION  07/30/12    HX HEENT      dental extract    HX LUMBAR LAMINECTOMY  02/08/2018    L4 laminectomy, L5 laminectomy       Social History     Socioeconomic History    Marital status:      Spouse name: Not on file    Number of children: Not on file    Years of education: Not on file    Highest education level: Not on file   Social Needs    Financial resource strain: Not on file    Food insecurity - worry: Not on file    Food insecurity - inability: Not on file   [employer] needs - medical: Not on file   [employer] needs - non-medical: Not on file   Occupational History    Not on file   Tobacco Use    Smoking status: Never Smoker    Smokeless tobacco: Never Used   Substance and Sexual Activity    Alcohol use:  Yes     Alcohol/week: 0.0 oz     Types: 2 - 3 Standard drinks or equivalent per week     Comment: \"few drinks every evening\"    Drug use: No    Sexual activity: Yes     Partners: Female   Other Topics Concern    Not on file   Social History Narrative    Not on file       Patient does not have an advanced directive on file    Vitals:    01/15/19 1400   BP: 130/80   Pulse: 83   Resp: 16   Temp: 97.8 °F (36.6 °C)   TempSrc: Tympanic   SpO2: 96%   Weight: 222 lb (100.7 kg)   Height: 5' 5\" (1.651 m)   PainSc:   4   PainLoc: Back       Physical Exam   Cardiovascular: Normal rate, regular rhythm and normal heart sounds. Pulmonary/Chest: Effort normal and breath sounds normal.   Musculoskeletal:        Lumbar back: He exhibits decreased range of motion, tenderness, pain and spasm. He exhibits no swelling. Neurological: He is alert. Skin: Skin is warm. Nursing note and vitals reviewed. No visits with results within 3 Month(s) from this visit. Latest known visit with results is:   Hospital Outpatient Visit on 10/04/2018   Component Date Value Ref Range Status    Special Requests: 10/04/2018 NO SPECIAL REQUESTS    Final    GRAM STAIN 10/04/2018 NO ORGANISMS SEEN    Final    GRAM STAIN 10/04/2018 NO WBC'S SEEN    Final    Culture result: 10/04/2018 FEW STAPHYLOCOCCUS SPECIES, COAGULASE NEGATIVE (TWO MORPHOTYPES)*   Final       .No results found for any visits on 01/15/19. Assessment / Plan:      ICD-10-CM ICD-9-CM    1. Lumbar radiculopathy M54.16 724.4    2. Essential hypertension I10 401.9      Lumbar pain-patient symptoms controlled with muscle relaxers  Work note given for yesterday and today. FMLA paper completed    Follow-up Disposition:  Return if symptoms worsen or fail to improve. I asked the patient if he  had any questions and answered his  questions.   The patient stated that he understands the treatment plan and agrees with the treatment plan

## 2019-01-15 NOTE — LETTER
NOTIFICATION RETURN TO WORK / SCHOOL 
 
1/15/2019 2:41 PM 
 
Mr. Celia Betancourt [address] [phone] To Whom It May Concern: Celia Betancourt is currently under the care of [facility] for 01/14/2019-01/15/2019. He will return to work/school on: 01/16/2019 If there are questions or concerns please have the patient contact our office.  
 
 
 
Sincerely, 
 
 
 
 
Erlin Olvera NP

## 2019-01-24 ENCOUNTER — OFFICE VISIT (OUTPATIENT)
Dept: INTERNAL MEDICINE CLINIC | Age: 49
End: 2019-01-24

## 2019-01-24 VITALS
HEART RATE: 86 BPM | RESPIRATION RATE: 18 BRPM | TEMPERATURE: 98.8 F | DIASTOLIC BLOOD PRESSURE: 80 MMHG | SYSTOLIC BLOOD PRESSURE: 134 MMHG | BODY MASS INDEX: 37.55 KG/M2 | OXYGEN SATURATION: 97 % | WEIGHT: 225.4 LBS | HEIGHT: 65 IN

## 2019-01-24 DIAGNOSIS — I10 ESSENTIAL HYPERTENSION: ICD-10-CM

## 2019-01-24 DIAGNOSIS — M54.50 LUMBAR PAIN: Primary | ICD-10-CM

## 2019-01-24 NOTE — LETTER
NOTIFICATION RETURN TO WORK / SCHOOL 
 
1/24/2019 2:15 PM 
 
Mr. Andria Barrientos [address] [place] [phone] To Whom It May Concern: Andria Barrientos is currently under the care of [address] [facility]. He will return to work/school on: 01/25/2019 If there are questions or concerns please have the patient contact our office.  
 
 
 
Sincerely, 
 
 
Ty Null NP

## 2019-01-24 NOTE — PROGRESS NOTES
Rosy Calderón is a 53 y.o. male presenting today for Back Pain  . HPI:  Rosy Calderón presents to the office today for lumbar pain. Patient is status post lumbar laminectomy reports when he was at work yesterday he was sitting in a chair that fell apart while he was sitting on it. He reports when he fell he injured his lumbar region, incisional site. Patient reports he woke up this morning with increased lumbar pain and pulling at the site. Patient presents today complaining of pain at a 3 out of 10. He reports the pain was worse this morning but has slightly improved. He denies any saddle anesthesia. Review of Systems   Respiratory: Negative for cough. Cardiovascular: Negative for chest pain, palpitations and leg swelling. Gastrointestinal: Negative for constipation and diarrhea. Musculoskeletal: Positive for back pain. Allergies   Allergen Reactions    Iodine Unknown (comments)       Current Outpatient Medications   Medication Sig Dispense Refill    labetalol (NORMODYNE) 100 mg tablet TAKE 1 TABLET BY MOUTH TWICE DAILY ([address]). 180 Tab 1    traMADol (ULTRAM) 50 mg tablet Take 1 Tab by mouth every six (6) hours as needed for Pain. Max Daily Amount: 200 mg. 24 Tab 1    metFORMIN (GLUCOPHAGE) 500 mg tablet TAKE 1 TABLET BY MOUTH TWICE DAILY WITH MEALS 60 Tab 1    ibuprofen (MOTRIN) 800 mg tablet TAKE 1 TABLET BY MOUTH EVERY 8 HOURS AS NEEDED FOR PAIN 90 Tab 0    tiZANidine (ZANAFLEX) 2 mg tablet TAKE 1 TABLET BY MOUTH THREE TIMES DAILY AS NEEDED 90 Tab 0    gabapentin (NEURONTIN) 600 mg tablet TAKE ONE TABLET BY MOUTH THREE TIMES DAILY  Indications: NEUROPATHIC PAIN 90 Tab 2    telmisartan (MICARDIS) 40 mg tablet Take one tablet by mouth daily (stop valstartan) 90 Tab 3    aspirin delayed-release 81 mg tablet Take  by mouth daily.       predniSONE (DELTASONE) 10 mg tablet 3 tabs daily x 3 days, 2 tabs daily x 3 days, 1 tab daily x 3 days, 1/2 tab daily x 3 days 20 Tab 0       Past Medical History:   Diagnosis Date    Acute sinusitis     BBB (bundle branch block)     Bursitis disorder     Cardiac cath 07/30/2012    R dom. Patent coronary arteries. LVEDP 15.  RA 10.  RV 32/10. PA 32/18. Mean PASP 22. W 14.  CO 5.7 (TD). High CO & high O2 sats on right raise question of peripheral arterial venous shunting.  Cardiac echocardiogram 06/27/2012    EF 50-55%. Dyssynergic septal motion c/w LBBB. RVSP/PASP 50-55. Mild LAE. Mild-mod TR. Mild IVCE. 30 x 5-mm echodensity in interventricular groove (poss fibrinous deposit). Sm pericardial effus at apex.  Hx of cardiac cath     Braeden's Daughters at about 12years old.  Hypertension     Obesity     Other ill-defined conditions(799.89)     heart cath 2012    Skin lesion     Umbilical hernia without obstruction or gangrene     Unspecified sleep apnea     no diagnosed       Past Surgical History:   Procedure Laterality Date    HX HEART CATHETERIZATION  07/30/12    HX HEENT      dental extract    HX LUMBAR LAMINECTOMY  02/08/2018    L4 laminectomy, L5 laminectomy       Social History     Socioeconomic History    Marital status:      Spouse name: Not on file    Number of children: Not on file    Years of education: Not on file    Highest education level: Not on file   Social Needs    Financial resource strain: Not on file    Food insecurity - worry: Not on file    Food insecurity - inability: Not on file   [employer] needs - medical: Not on file   [employer] needs - non-medical: Not on file   Occupational History    Not on file   Tobacco Use    Smoking status: Never Smoker    Smokeless tobacco: Never Used   Substance and Sexual Activity    Alcohol use:  Yes     Alcohol/week: 0.0 oz     Types: 2 - 3 Standard drinks or equivalent per week     Comment: \"few drinks every evening\"    Drug use: No    Sexual activity: Yes     Partners: Female   Other Topics Concern    Not on file Social History Narrative    Not on file       Patient does not have an advanced directive on file    Vitals:    01/24/19 1411   BP: 134/80   Pulse: 86   Resp: 18   Temp: 98.8 °F (37.1 °C)   TempSrc: Tympanic   SpO2: 97%   Weight: 225 lb 6.4 oz (102.2 kg)   Height: 5' 5\" (1.651 m)   PainSc:   3   PainLoc: Back       Physical Exam   Constitutional: No distress. Cardiovascular: Normal rate, regular rhythm and normal heart sounds. Pulmonary/Chest: Effort normal and breath sounds normal.   Musculoskeletal: He exhibits no edema. Back:    Nursing note and vitals reviewed. No visits with results within 3 Month(s) from this visit. Latest known visit with results is:   Hospital Outpatient Visit on 10/04/2018   Component Date Value Ref Range Status    Special Requests: 10/04/2018 NO SPECIAL REQUESTS    Final    GRAM STAIN 10/04/2018 NO ORGANISMS SEEN    Final    GRAM STAIN 10/04/2018 NO WBC'S SEEN    Final    Culture result: 10/04/2018 FEW STAPHYLOCOCCUS SPECIES, COAGULASE NEGATIVE (TWO MORPHOTYPES)*   Final       .No results found for any visits on 01/24/19. Assessment / Plan:      ICD-10-CM ICD-9-CM    1. Lumbar pain M54.5 724.2    2. Essential hypertension I10 401.9      Lumbar pain-continue current treatment plan. Patient given work note  Hypertension-controlled    Follow-up Disposition:  Return if symptoms worsen or fail to improve. I asked the patient if he  had any questions and answered his  questions.   The patient stated that he understands the treatment plan and agrees with the treatment plan

## 2019-01-30 DIAGNOSIS — E11.21 TYPE II DIABETES MELLITUS WITH NEPHROPATHY (HCC): ICD-10-CM

## 2019-01-31 RX ORDER — METFORMIN HYDROCHLORIDE 500 MG/1
TABLET ORAL
Qty: 60 TAB | Refills: 1 | Status: SHIPPED | OUTPATIENT
Start: 2019-01-31 | End: 2019-04-08 | Stop reason: SDUPTHER

## 2019-02-08 ENCOUNTER — OFFICE VISIT (OUTPATIENT)
Dept: INTERNAL MEDICINE CLINIC | Age: 49
End: 2019-02-08

## 2019-02-08 VITALS
OXYGEN SATURATION: 97 % | DIASTOLIC BLOOD PRESSURE: 82 MMHG | HEART RATE: 100 BPM | TEMPERATURE: 98.7 F | SYSTOLIC BLOOD PRESSURE: 130 MMHG | WEIGHT: 222 LBS | BODY MASS INDEX: 36.99 KG/M2 | HEIGHT: 65 IN | RESPIRATION RATE: 16 BRPM

## 2019-02-08 DIAGNOSIS — M54.16 LUMBAR RADICULOPATHY: Primary | ICD-10-CM

## 2019-02-08 DIAGNOSIS — I10 ESSENTIAL HYPERTENSION: ICD-10-CM

## 2019-02-08 NOTE — PROGRESS NOTES
Megan Bynum is a 53 y.o. male presenting today for Back Pain  . HPI:  Megan Bynum presents to the office today for lumbar pain with radiculopathy. Patient has a history of chronic lumbar pain with radiculopathy. Patient notes this morning he woke up extremely stiff and had difficulty standing. He notes that he also had numbness and tingling in his feet and both fifth digits on bilateral feet were erythematous and painful. He was previously by podiatrist but has not had any follow-up appointments. He notes currently his back pain is a 2 out of 10 and he has been soaking his feet so the pain has improved. He denies any saddle anesthesia. Review of Systems   Respiratory: Negative for cough. Cardiovascular: Negative for chest pain and palpitations. Musculoskeletal: Positive for back pain. Neurological: Positive for tingling ( Bilateral lower extremity). Allergies   Allergen Reactions    Iodine Unknown (comments)       Current Outpatient Medications   Medication Sig Dispense Refill    metFORMIN (GLUCOPHAGE) 500 mg tablet TAKE 1 TABLET BY MOUTH TWICE DAILY WITH MEALS 60 Tab 1    labetalol (NORMODYNE) 100 mg tablet TAKE 1 TABLET BY MOUTH TWICE DAILY ([address]). 180 Tab 1    ibuprofen (MOTRIN) 800 mg tablet TAKE 1 TABLET BY MOUTH EVERY 8 HOURS AS NEEDED FOR PAIN 90 Tab 0    tiZANidine (ZANAFLEX) 2 mg tablet TAKE 1 TABLET BY MOUTH THREE TIMES DAILY AS NEEDED 90 Tab 0    gabapentin (NEURONTIN) 600 mg tablet TAKE ONE TABLET BY MOUTH THREE TIMES DAILY  Indications: NEUROPATHIC PAIN 90 Tab 2    telmisartan (MICARDIS) 40 mg tablet Take one tablet by mouth daily (stop valstartan) 90 Tab 3    aspirin delayed-release 81 mg tablet Take  by mouth daily.  traMADol (ULTRAM) 50 mg tablet Take 1 Tab by mouth every six (6) hours as needed for Pain.  Max Daily Amount: 200 mg. 24 Tab 1    predniSONE (DELTASONE) 10 mg tablet 3 tabs daily x 3 days, 2 tabs daily x 3 days, 1 tab daily x 3 days, 1/2 tab daily x 3 days 20 Tab 0       Past Medical History:   Diagnosis Date    Acute sinusitis     BBB (bundle branch block)     Bursitis disorder     Cardiac cath 07/30/2012    R dom. Patent coronary arteries. LVEDP 15.  RA 10.  RV 32/10. PA 32/18. Mean PASP 22. W 14.  CO 5.7 (TD). High CO & high O2 sats on right raise question of peripheral arterial venous shunting.  Cardiac echocardiogram 06/27/2012    EF 50-55%. Dyssynergic septal motion c/w LBBB. RVSP/PASP 50-55. Mild LAE. Mild-mod TR. Mild IVCE. 30 x 5-mm echodensity in interventricular groove (poss fibrinous deposit). Sm pericardial effus at apex.  Hx of cardiac cath     Braeden's Daughters at about 12years old.  Hypertension     Obesity     Other ill-defined conditions(799.89)     heart cath 2012    Skin lesion     Umbilical hernia without obstruction or gangrene     Unspecified sleep apnea     no diagnosed       Past Surgical History:   Procedure Laterality Date    HX HEART CATHETERIZATION  07/30/12    HX HEENT      dental extract    HX LUMBAR LAMINECTOMY  02/08/2018    L4 laminectomy, L5 laminectomy       Social History     Socioeconomic History    Marital status:      Spouse name: Not on file    Number of children: Not on file    Years of education: Not on file    Highest education level: Not on file   Social Needs    Financial resource strain: Not on file    Food insecurity - worry: Not on file    Food insecurity - inability: Not on file   [employer] needs - medical: Not on file   [employer] needs - non-medical: Not on file   Occupational History    Not on file   Tobacco Use    Smoking status: Never Smoker    Smokeless tobacco: Never Used   Substance and Sexual Activity    Alcohol use:  Yes     Alcohol/week: 0.0 oz     Types: 2 - 3 Standard drinks or equivalent per week     Comment: \"few drinks every evening\"    Drug use: No    Sexual activity: Yes     Partners: Female   Other Topics Concern    Not on file   Social History Narrative    Not on file       Patient does not have an advanced directive on file    Vitals:    02/08/19 1526   BP: 130/82   Pulse: 100   Resp: 16   Temp: 98.7 °F (37.1 °C)   TempSrc: Tympanic   SpO2: 97%   Weight: 222 lb (100.7 kg)   Height: 5' 5\" (1.651 m)   PainSc:   2   PainLoc: Back       Physical Exam   Cardiovascular: Normal rate, regular rhythm and normal heart sounds. Pulmonary/Chest: Effort normal and breath sounds normal.   Musculoskeletal: He exhibits tenderness. He exhibits no edema. Lumbar back: He exhibits pain. Back:    Neurological: He is alert. Nursing note and vitals reviewed. No visits with results within 3 Month(s) from this visit. Latest known visit with results is:   Hospital Outpatient Visit on 10/04/2018   Component Date Value Ref Range Status    Special Requests: 10/04/2018 NO SPECIAL REQUESTS    Final    GRAM STAIN 10/04/2018 NO ORGANISMS SEEN    Final    GRAM STAIN 10/04/2018 NO WBC'S SEEN    Final    Culture result: 10/04/2018 FEW STAPHYLOCOCCUS SPECIES, COAGULASE NEGATIVE (TWO MORPHOTYPES)*   Final       .No results found for any visits on 02/08/19. Assessment / Plan:      ICD-10-CM ICD-9-CM    1. Lumbar radiculopathy M54.16 724.4    2. Essential hypertension I10 401.9      Lumbar pain with radiculopathy-continue current treatment plan  Hypertension-controlled. No change to current treatment plan  Follow-up as needed  Patient given work note for today    Follow-up Disposition:  Return if symptoms worsen or fail to improve. I asked the patient if he  had any questions and answered his  questions.   The patient stated that he understands the treatment plan and agrees with the treatment plan

## 2019-02-08 NOTE — LETTER
NOTIFICATION RETURN TO WORK / SCHOOL 
 
2/8/2019 3:42 PM 
 
Mr. Cesar Alves [facility] [address] [phone] To Whom It May Concern: Cesar Alves is currently under the care of [address] [place] [address] [place] for chronic back pain. He will return to work/school on: 02/11/2019 If there are questions or concerns please have the patient contact our office.  
 
 
 
Sincerely, 
 
 
 
 
 
Cristofer Cisse NP

## 2019-02-25 ENCOUNTER — OFFICE VISIT (OUTPATIENT)
Dept: INTERNAL MEDICINE CLINIC | Age: 49
End: 2019-02-25

## 2019-02-25 VITALS
SYSTOLIC BLOOD PRESSURE: 130 MMHG | WEIGHT: 223.9 LBS | HEART RATE: 68 BPM | TEMPERATURE: 98.7 F | DIASTOLIC BLOOD PRESSURE: 84 MMHG | RESPIRATION RATE: 16 BRPM | HEIGHT: 65 IN | OXYGEN SATURATION: 96 % | BODY MASS INDEX: 37.3 KG/M2

## 2019-02-25 DIAGNOSIS — M54.50 CHRONIC BILATERAL LOW BACK PAIN WITHOUT SCIATICA: Primary | ICD-10-CM

## 2019-02-25 DIAGNOSIS — G89.29 CHRONIC BILATERAL LOW BACK PAIN WITHOUT SCIATICA: Primary | ICD-10-CM

## 2019-02-25 DIAGNOSIS — I10 ESSENTIAL HYPERTENSION: ICD-10-CM

## 2019-02-25 NOTE — PROGRESS NOTES
Andria Barrientos is a 53 y.o. male presenting today for Back Pain  . Back Pain    Associated symptoms include tingling ( Bilateral lower extremity). Pertinent negatives include no chest pain.   :  Andria Barrientos presents to the office today for lumbar pain with radiculopathy. Patient has a history of chronic lumbar pain with radiculopathy. Patient notes this morning he woke up extremely stiff and had difficulty standing. He notes that he also had numbness and tingling in his feet and both fifth digits on bilateral feet were erythematous and painful. He notes currently his back pain is a 4t of 10. Annamary Ripa He denies any saddle anesthesia. Review of Systems   Respiratory: Negative for cough. Cardiovascular: Negative for chest pain and palpitations. Musculoskeletal: Positive for back pain. Neurological: Positive for tingling ( Bilateral lower extremity). Allergies   Allergen Reactions    Iodine Unknown (comments)       Current Outpatient Medications   Medication Sig Dispense Refill    metFORMIN (GLUCOPHAGE) 500 mg tablet TAKE 1 TABLET BY MOUTH TWICE DAILY WITH MEALS 60 Tab 1    labetalol (NORMODYNE) 100 mg tablet TAKE 1 TABLET BY MOUTH TWICE DAILY ([address]). 180 Tab 1    ibuprofen (MOTRIN) 800 mg tablet TAKE 1 TABLET BY MOUTH EVERY 8 HOURS AS NEEDED FOR PAIN 90 Tab 0    tiZANidine (ZANAFLEX) 2 mg tablet TAKE 1 TABLET BY MOUTH THREE TIMES DAILY AS NEEDED 90 Tab 0    gabapentin (NEURONTIN) 600 mg tablet TAKE ONE TABLET BY MOUTH THREE TIMES DAILY  Indications: NEUROPATHIC PAIN 90 Tab 2    telmisartan (MICARDIS) 40 mg tablet Take one tablet by mouth daily (stop valstartan) 90 Tab 3    aspirin delayed-release 81 mg tablet Take  by mouth daily.  traMADol (ULTRAM) 50 mg tablet Take 1 Tab by mouth every six (6) hours as needed for Pain.  Max Daily Amount: 200 mg. 24 Tab 1    predniSONE (DELTASONE) 10 mg tablet 3 tabs daily x 3 days, 2 tabs daily x 3 days, 1 tab daily x 3 days, 1/2 tab daily x 3 days 20 Tab 0       Past Medical History:   Diagnosis Date    Acute sinusitis     BBB (bundle branch block)     Bursitis disorder     Cardiac cath 07/30/2012    R dom. Patent coronary arteries. LVEDP 15.  RA 10.  RV 32/10. PA 32/18. Mean PASP 22. W 14.  CO 5.7 (TD). High CO & high O2 sats on right raise question of peripheral arterial venous shunting.  Cardiac echocardiogram 06/27/2012    EF 50-55%. Dyssynergic septal motion c/w LBBB. RVSP/PASP 50-55. Mild LAE. Mild-mod TR. Mild IVCE. 30 x 5-mm echodensity in interventricular groove (poss fibrinous deposit). Sm pericardial effus at apex.  Hx of cardiac cath     Braeden's Daughters at about 12years old.  Hypertension     Obesity     Other ill-defined conditions(799.89)     heart cath 2012    Skin lesion     Umbilical hernia without obstruction or gangrene     Unspecified sleep apnea     no diagnosed       Past Surgical History:   Procedure Laterality Date    HX HEART CATHETERIZATION  07/30/12    HX HEENT      dental extract    HX LUMBAR LAMINECTOMY  02/08/2018    L4 laminectomy, L5 laminectomy       Social History     Socioeconomic History    Marital status:      Spouse name: Not on file    Number of children: Not on file    Years of education: Not on file    Highest education level: Not on file   Social Needs    Financial resource strain: Not on file    Food insecurity - worry: Not on file    Food insecurity - inability: Not on file   [email or web address] needs - medical: Not on file   [email or web address] needs - non-medical: Not on file   Occupational History    Not on file   Tobacco Use    Smoking status: Never Smoker    Smokeless tobacco: Never Used   Substance and Sexual Activity    Alcohol use:  Yes     Alcohol/week: 0.0 oz     Types: 2 - 3 Standard drinks or equivalent per week     Comment: \"few drinks every evening\"    Drug use: No    Sexual activity: Yes     Partners: Female   Other Topics Concern    Not on file   Social History Narrative    Not on file       Patient does not have an advanced directive on file    Vitals:    02/25/19 1529   BP: 130/84   Pulse: 68   Resp: 16   Temp: 98.7 °F (37.1 °C)   TempSrc: Tympanic   SpO2: 96%   Weight: 223 lb 14.4 oz (101.6 kg)   Height: 5' 5\" (1.651 m)   PainSc:   4   PainLoc: Back       Physical Exam   Cardiovascular: Normal rate, regular rhythm and normal heart sounds. Pulmonary/Chest: Effort normal and breath sounds normal.   Musculoskeletal: He exhibits tenderness. He exhibits no edema. Lumbar back: He exhibits pain. Back:    Neurological: He is alert. Nursing note and vitals reviewed. No visits with results within 3 Month(s) from this visit. Latest known visit with results is:   Hospital Outpatient Visit on 10/04/2018   Component Date Value Ref Range Status    Special Requests: 10/04/2018 NO SPECIAL REQUESTS    Final    GRAM STAIN 10/04/2018 NO ORGANISMS SEEN    Final    GRAM STAIN 10/04/2018 NO WBC'S SEEN    Final    Culture result: 10/04/2018 FEW STAPHYLOCOCCUS SPECIES, COAGULASE NEGATIVE (TWO MORPHOTYPES)*   Final       .No results found for any visits on 02/25/19. Assessment / Plan:      ICD-10-CM ICD-9-CM    1. Chronic bilateral low back pain without sciatica M54.5 724.2     G89.29 338.29    2. Essential hypertension I10 401.9      Lumbar pain with radiculopathy-continue current treatment plan  Hypertension-controlled. No change to current treatment plan  Follow-up as needed  Patient given work note for today    Follow-up Disposition:  Return if symptoms worsen or fail to improve. I asked the patient if he  had any questions and answered his  questions.   The patient stated that he understands the treatment plan and agrees with the treatment plan

## 2019-02-25 NOTE — LETTER
NOTIFICATION RETURN TO WORK / SCHOOL 
 
2/25/2019 3:56 PM 
 
Mr. Rhea Nam [address] [place] [phone] To Whom It May Concern: Rhea Nam is currently under the care of [address] [facility]. He will return to work/school on: 02/26/2019 If there are questions or concerns please have the patient contact our office.  
 
 
 
Sincerely, 
 
 
 
 
 
Goyo Apple NP

## 2019-03-25 ENCOUNTER — TELEPHONE (OUTPATIENT)
Dept: FAMILY MEDICINE CLINIC | Facility: CLINIC | Age: 49
End: 2019-03-25

## 2019-03-26 NOTE — TELEPHONE ENCOUNTER
I have attempted to contact this patient by phone with the following results: left message to return my call on answering machine. In reference to [facility] paperwork should be completed by 3/27/2019 in the afternoon.

## 2019-04-04 ENCOUNTER — OFFICE VISIT (OUTPATIENT)
Dept: FAMILY MEDICINE CLINIC | Facility: CLINIC | Age: 49
End: 2019-04-04

## 2019-04-04 VITALS
DIASTOLIC BLOOD PRESSURE: 70 MMHG | RESPIRATION RATE: 16 BRPM | WEIGHT: 219 LBS | SYSTOLIC BLOOD PRESSURE: 132 MMHG | OXYGEN SATURATION: 98 % | TEMPERATURE: 98.8 F | BODY MASS INDEX: 36.49 KG/M2 | HEIGHT: 65 IN | HEART RATE: 90 BPM

## 2019-04-04 DIAGNOSIS — I10 ESSENTIAL HYPERTENSION: Primary | ICD-10-CM

## 2019-04-04 DIAGNOSIS — Z13.21 ENCOUNTER FOR VITAMIN DEFICIENCY SCREENING: ICD-10-CM

## 2019-04-04 DIAGNOSIS — Z13.29 THYROID DISORDER SCREEN: ICD-10-CM

## 2019-04-04 DIAGNOSIS — Z13.5 SCREENING FOR DIABETIC RETINOPATHY: ICD-10-CM

## 2019-04-04 DIAGNOSIS — M54.50 LUMBAR BACK PAIN: ICD-10-CM

## 2019-04-04 DIAGNOSIS — E11.9 CONTROLLED TYPE 2 DIABETES MELLITUS WITHOUT COMPLICATION, WITHOUT LONG-TERM CURRENT USE OF INSULIN (HCC): ICD-10-CM

## 2019-04-04 RX ORDER — IBUPROFEN 800 MG/1
TABLET ORAL
Qty: 90 TAB | Refills: 0 | Status: SHIPPED | OUTPATIENT
Start: 2019-04-04 | End: 2019-08-11 | Stop reason: SDUPTHER

## 2019-04-04 NOTE — PATIENT INSTRUCTIONS
Body Mass Index: Care Instructions  Your Care Instructions    Body mass index (BMI) can help you see if your weight is raising your risk for health problems. It uses a formula to compare how much you weigh with how tall you are. · A BMI lower than 18.5 is considered underweight. · A BMI between 18.5 and 24.9 is considered healthy. · A BMI between 25 and 29.9 is considered overweight. A BMI of 30 or higher is considered obese. If your BMI is in the normal range, it means that you have a lower risk for weight-related health problems. If your BMI is in the overweight or obese range, you may be at increased risk for weight-related health problems, such as high blood pressure, heart disease, stroke, arthritis or joint pain, and diabetes. If your BMI is in the underweight range, you may be at increased risk for health problems such as fatigue, lower protection (immunity) against illness, muscle loss, bone loss, hair loss, and hormone problems. BMI is just one measure of your risk for weight-related health problems. You may be at higher risk for health problems if you are not active, you eat an unhealthy diet, or you drink too much alcohol or use tobacco products. Follow-up care is a key part of your treatment and safety. Be sure to make and go to all appointments, and call your doctor if you are having problems. It's also a good idea to know your test results and keep a list of the medicines you take. How can you care for yourself at home? · Practice healthy eating habits. This includes eating plenty of fruits, vegetables, whole grains, lean protein, and low-fat dairy. · If your doctor recommends it, get more exercise. Walking is a good choice. Bit by bit, increase the amount you walk every day. Try for at least 30 minutes on most days of the week. · Do not smoke. Smoking can increase your risk for health problems. If you need help quitting, talk to your doctor about stop-smoking programs and medicines. These can increase your chances of quitting for good. · Limit alcohol to 2 drinks a day for men and 1 drink a day for women. Too much alcohol can cause health problems. If you have a BMI higher than 25  · Your doctor may do other tests to check your risk for weight-related health problems. This may include measuring the distance around your waist. A waist measurement of more than 40 inches in men or 35 inches in women can increase the risk of weight-related health problems. · Talk with your doctor about steps you can take to stay healthy or improve your health. You may need to make lifestyle changes to lose weight and stay healthy, such as changing your diet and getting regular exercise. If you have a BMI lower than 18.5  · Your doctor may do other tests to check your risk for health problems. · Talk with your doctor about steps you can take to stay healthy or improve your health. You may need to make lifestyle changes to gain or maintain weight and stay healthy, such as getting more healthy foods in your diet and doing exercises to build muscle. Where can you learn more? Go to http://marquise-nehemias.info/. Enter S176 in the search box to learn more about \"Body Mass Index: Care Instructions. \"  Current as of: October 13, 2016  Content Version: 11.4  © 1807-4530 Healthwise, [employer]. Care instructions adapted under license by [employer] (which disclaims liability or warranty for this information). If you have questions about a medical condition or this instruction, always ask your healthcare professional. [address] any warranty or liability for your use of this information.

## 2019-04-04 NOTE — LETTER
NOTIFICATION RETURN TO WORK / SCHOOL 
 
4/4/2019 3:51 PM 
 
Mr. Aby Whitney [address] [id number] To Whom It May Concern: Aby Whitney is currently under the care of [facility] OFFICE 04/01/2019 - 04/04/2019. He will return to work/school on: 04/05/2019 If there are questions or concerns please have the patient contact our office.  
 
 
 
Sincerely, 
 
 
 
 
 
Mine Amanda NP

## 2019-04-04 NOTE — PROGRESS NOTES
Raine Ellsworth is a 53 y.o. male presenting today for Back Pain  . HPI:  Raine Ellsworth presents to the office today for lumbar back pain. Patient has a history of chronic lumbar back pain. He presents today complaining of pain at a 3 out of 10. He notes the pain is worse in the morning but improves throughout the day with movement. Patient previously had lumbar surgery and is concerned about his continuous pain. He presents today requesting a referral to a new orthopedic spine specialist.  He denies any radiculopathy today. He is negative for chest pain, palpitation or lower extremity edema. He is negative for cough, wheezing or congestion. Review of Systems   Respiratory: Negative for cough. Cardiovascular: Negative for chest pain and palpitations. Musculoskeletal: Positive for back pain and myalgias. Allergies   Allergen Reactions    Iodine Unknown (comments)       Current Outpatient Medications   Medication Sig Dispense Refill    ibuprofen (MOTRIN) 800 mg tablet TAKE 1 TABLET BY MOUTH EVERY 8 HOURS AS NEEDED FOR PAIN 90 Tab 0    labetalol (NORMODYNE) 100 mg tablet TAKE 1 TABLET BY MOUTH TWICE DAILY ([address]). 180 Tab 1    traMADol (ULTRAM) 50 mg tablet Take 1 Tab by mouth every six (6) hours as needed for Pain. Max Daily Amount: 200 mg. 24 Tab 1    tiZANidine (ZANAFLEX) 2 mg tablet TAKE 1 TABLET BY MOUTH THREE TIMES DAILY AS NEEDED 90 Tab 0    gabapentin (NEURONTIN) 600 mg tablet TAKE ONE TABLET BY MOUTH THREE TIMES DAILY  Indications: NEUROPATHIC PAIN 90 Tab 2    telmisartan (MICARDIS) 40 mg tablet Take one tablet by mouth daily (stop valstartan) 90 Tab 3    aspirin delayed-release 81 mg tablet Take  by mouth daily.       metFORMIN (GLUCOPHAGE) 500 mg tablet TAKE 1 TABLET BY MOUTH TWICE DAILY WITH MEALS 60 Tab 1    predniSONE (DELTASONE) 10 mg tablet 3 tabs daily x 3 days, 2 tabs daily x 3 days, 1 tab daily x 3 days, 1/2 tab daily x 3 days 20 Tab 0       Past Medical History:   Diagnosis Date    Acute sinusitis     BBB (bundle branch block)     Bursitis disorder     Cardiac cath 07/30/2012    R dom. Patent coronary arteries. LVEDP 15.  RA 10.  RV 32/10. PA 32/18. Mean PASP 22. W 14.  CO 5.7 (TD). High CO & high O2 sats on right raise question of peripheral arterial venous shunting.  Cardiac echocardiogram 06/27/2012    EF 50-55%. Dyssynergic septal motion c/w LBBB. RVSP/PASP 50-55. Mild LAE. Mild-mod TR. Mild IVCE. 30 x 5-mm echodensity in interventricular groove (poss fibrinous deposit). Sm pericardial effus at apex.  Hx of cardiac cath     Braeden's Daughters at about 12years old.  Hypertension     Obesity     Other ill-defined conditions(799.89)     heart cath 2012    Skin lesion     Umbilical hernia without obstruction or gangrene     Unspecified sleep apnea     no diagnosed       Past Surgical History:   Procedure Laterality Date    HX HEART CATHETERIZATION  07/30/12    HX HEENT      dental extract    HX LUMBAR LAMINECTOMY  02/08/2018    L4 laminectomy, L5 laminectomy       Social History     Socioeconomic History    Marital status:      Spouse name: Not on file    Number of children: Not on file    Years of education: Not on file    Highest education level: Not on file   Occupational History    Not on file   Social Needs    Financial resource strain: Not on file    Food insecurity:     Worry: Not on file     Inability: Not on file    Transportation needs:     Medical: Not on file     Non-medical: Not on file   Tobacco Use    Smoking status: Never Smoker    Smokeless tobacco: Never Used   Substance and Sexual Activity    Alcohol use:  Yes     Alcohol/week: 0.0 oz     Types: 2 - 3 Standard drinks or equivalent per week     Comment: \"few drinks every evening\"    Drug use: No    Sexual activity: Yes     Partners: Female   Lifestyle    Physical activity:     Days per week: Not on file     Minutes per session: Not on file    Stress: Not on file   Relationships    Social connections:     Talks on phone: Not on file     Gets together: Not on file     Attends Orthodoxy service: Not on file     Active member of club or organization: Not on file     Attends meetings of clubs or organizations: Not on file     Relationship status: Not on file    Intimate partner violence:     Fear of current or ex partner: Not on file     Emotionally abused: Not on file     Physically abused: Not on file     Forced sexual activity: Not on file   Other Topics Concern    Not on file   Social History Narrative    Not on file       Patient does not have an advanced directive on file    Vitals:    04/04/19 1509   BP: 132/70   Pulse: 90   Resp: 16   Temp: 98.8 °F (37.1 °C)   TempSrc: Tympanic   SpO2: 98%   Weight: 219 lb (99.3 kg)   Height: 5' 5\" (1.651 m)   PainSc:   3   PainLoc: Back       Physical Exam   Constitutional: No distress. Cardiovascular: Normal rate, regular rhythm and normal heart sounds. Pulmonary/Chest: Effort normal and breath sounds normal.   Musculoskeletal: He exhibits no edema or tenderness. Neurological: He is alert. Nursing note and vitals reviewed. No visits with results within 3 Month(s) from this visit. Latest known visit with results is:   Hospital Outpatient Visit on 10/04/2018   Component Date Value Ref Range Status    Special Requests: 10/04/2018 NO SPECIAL REQUESTS    Final    GRAM STAIN 10/04/2018 NO ORGANISMS SEEN    Final    GRAM STAIN 10/04/2018 NO WBC'S SEEN    Final    Culture result: 10/04/2018 FEW STAPHYLOCOCCUS SPECIES, COAGULASE NEGATIVE (TWO MORPHOTYPES)*   Final       .No results found for any visits on 04/04/19. Assessment / Plan:      ICD-10-CM ICD-9-CM    1. Essential hypertension I10 401.9 ibuprofen (MOTRIN) 800 mg tablet      CBC WITH AUTOMATED DIFF      LIPID PANEL      METABOLIC PANEL, COMPREHENSIVE   2. Lumbar back pain M54.5 724.2 REFERRAL TO SPINE SURGERY   3.  Controlled type 2 diabetes mellitus without complication, without long-term current use of insulin (HCC) E11.9 250.00 HEMOGLOBIN A1C WITH EAG      MICROALBUMIN, UR, RAND W/ MICROALB/CREAT RATIO   4. Encounter for vitamin deficiency screening Z13.21 V77.99 VITAMIN D, 25 HYDROXY   5. Thyroid disorder screen Z13.29 V77.0 TSH 3RD GENERATION   6. Screening for diabetic retinopathy Z13.5 V80.2 REFERRAL TO OPHTHALMOLOGY     Patient requests this referral for a new orthopedic physician-referred to [facility]  Diabetes-hemoglobin A1c ordered  Hypertension-fasting labs ordered CBC, lipid and CMP  Screening for vitamin D and thyroid  Patient given referral to ophthalmology  Hypertension-blood pressure controlled  Patient requesting refill of his ibuprofen for his chronic lumbar back pain    Follow-up and Dispositions    · Return in about 4 months (around 8/4/2019), or if symptoms worsen or fail to improve. I asked the patient if he  had any questions and answered his  questions. The patient stated that he understands the treatment plan and agrees with the treatment plan    Discussed the patient's BMI with him. The BMI follow up plan is as follows:     dietary management education, guidance, and counseling  encourage exercise  monitor weight  prescribed dietary intake    An After Visit Summary was printed and given to the patient.

## 2019-04-08 DIAGNOSIS — E11.21 TYPE II DIABETES MELLITUS WITH NEPHROPATHY (HCC): ICD-10-CM

## 2019-04-08 RX ORDER — METFORMIN HYDROCHLORIDE 500 MG/1
TABLET ORAL
Qty: 60 TAB | Refills: 1 | Status: SHIPPED | OUTPATIENT
Start: 2019-04-08 | End: 2019-06-17 | Stop reason: SDUPTHER

## 2019-05-21 ENCOUNTER — HOSPITAL ENCOUNTER (OUTPATIENT)
Dept: MRI IMAGING | Age: 49
Discharge: HOME | End: 2019-05-21
Attending: ORTHOPAEDIC SURGERY
Payer: COMMERCIAL

## 2019-05-21 DIAGNOSIS — M54.50 LOW BACK PAIN: ICD-10-CM

## 2019-05-21 PROCEDURE — 72148 MRI LUMBAR SPINE W/O DYE: CPT

## 2019-06-17 DIAGNOSIS — E11.21 TYPE II DIABETES MELLITUS WITH NEPHROPATHY (HCC): ICD-10-CM

## 2019-06-17 RX ORDER — METFORMIN HYDROCHLORIDE 500 MG/1
TABLET ORAL
Qty: 60 TAB | Refills: 1 | Status: SHIPPED | OUTPATIENT
Start: 2019-06-17 | End: 2019-09-01 | Stop reason: SDUPTHER

## 2019-07-16 DIAGNOSIS — E11.9 CONTROLLED TYPE 2 DIABETES MELLITUS WITHOUT COMPLICATION, WITHOUT LONG-TERM CURRENT USE OF INSULIN (HCC): ICD-10-CM

## 2019-07-16 DIAGNOSIS — Z00.00 ANNUAL PHYSICAL EXAM: Primary | ICD-10-CM

## 2019-07-16 DIAGNOSIS — I10 ESSENTIAL HYPERTENSION: ICD-10-CM

## 2019-07-17 ENCOUNTER — HOSPITAL ENCOUNTER (OUTPATIENT)
Dept: LAB | Age: 49
Discharge: HOME | End: 2019-07-17
Payer: COMMERCIAL

## 2019-07-17 DIAGNOSIS — Z00.00 ANNUAL PHYSICAL EXAM: ICD-10-CM

## 2019-07-17 DIAGNOSIS — E11.9 CONTROLLED TYPE 2 DIABETES MELLITUS WITHOUT COMPLICATION, WITHOUT LONG-TERM CURRENT USE OF INSULIN (HCC): ICD-10-CM

## 2019-07-17 DIAGNOSIS — I10 ESSENTIAL HYPERTENSION: ICD-10-CM

## 2019-07-17 LAB
ALBUMIN SERPL-MCNC: 4 G/DL (ref 3.4–5)
ALBUMIN/GLOB SERPL: 1.4 {RATIO} (ref 0.8–1.7)
ALP SERPL-CCNC: 75 U/L (ref 45–117)
ALT SERPL-CCNC: 52 U/L (ref 16–61)
ANION GAP SERPL CALC-SCNC: 6 MMOL/L (ref 3–18)
AST SERPL-CCNC: 24 U/L (ref 15–37)
BASOPHILS # BLD: 0 K/UL (ref 0–0.1)
BASOPHILS NFR BLD: 1 % (ref 0–2)
BILIRUB SERPL-MCNC: 0.4 MG/DL (ref 0.2–1)
BUN SERPL-MCNC: 12 MG/DL (ref 7–18)
BUN/CREAT SERPL: 13 (ref 12–20)
CALCIUM SERPL-MCNC: 9 MG/DL (ref 8.5–10.1)
CHLORIDE SERPL-SCNC: 104 MMOL/L (ref 100–108)
CHOLEST SERPL-MCNC: 230 MG/DL
CO2 SERPL-SCNC: 27 MMOL/L (ref 21–32)
CREAT SERPL-MCNC: 0.93 MG/DL (ref 0.6–1.3)
CREAT UR-MCNC: 211 MG/DL (ref 30–125)
DIFFERENTIAL METHOD BLD: NORMAL
EOSINOPHIL # BLD: 0.2 K/UL (ref 0–0.4)
EOSINOPHIL NFR BLD: 3 % (ref 0–5)
ERYTHROCYTE [DISTWIDTH] IN BLOOD BY AUTOMATED COUNT: 12.6 % (ref 11.6–14.5)
EST. AVERAGE GLUCOSE BLD GHB EST-MCNC: 163 MG/DL
GLOBULIN SER CALC-MCNC: 2.8 G/DL (ref 2–4)
GLUCOSE SERPL-MCNC: 161 MG/DL (ref 74–99)
HBA1C MFR BLD: 7.3 % (ref 4.2–5.6)
HCT VFR BLD AUTO: 44.1 % (ref 36–48)
HDLC SERPL-MCNC: 38 MG/DL (ref 40–60)
HDLC SERPL: 6.1 {RATIO} (ref 0–5)
HGB BLD-MCNC: 15.5 G/DL (ref 13–16)
LDLC SERPL CALC-MCNC: 142 MG/DL (ref 0–100)
LIPID PROFILE,FLP: ABNORMAL
LYMPHOCYTES # BLD: 1.9 K/UL (ref 0.9–3.6)
LYMPHOCYTES NFR BLD: 26 % (ref 21–52)
MCH RBC QN AUTO: 29.9 PG (ref 24–34)
MCHC RBC AUTO-ENTMCNC: 35.1 G/DL (ref 31–37)
MCV RBC AUTO: 85.1 FL (ref 74–97)
MICROALBUMIN UR-MCNC: 2.25 MG/DL (ref 0–3)
MICROALBUMIN/CREAT UR-RTO: 11 MG/G (ref 0–30)
MONOCYTES # BLD: 0.7 K/UL (ref 0.05–1.2)
MONOCYTES NFR BLD: 10 % (ref 3–10)
NEUTS SEG # BLD: 4.2 K/UL (ref 1.8–8)
NEUTS SEG NFR BLD: 60 % (ref 40–73)
PLATELET # BLD AUTO: 244 K/UL (ref 135–420)
PMV BLD AUTO: 10.2 FL (ref 9.2–11.8)
POTASSIUM SERPL-SCNC: 4.5 MMOL/L (ref 3.5–5.5)
PROT SERPL-MCNC: 6.8 G/DL (ref 6.4–8.2)
RBC # BLD AUTO: 5.18 M/UL (ref 4.7–5.5)
SODIUM SERPL-SCNC: 137 MMOL/L (ref 136–145)
TRIGL SERPL-MCNC: 250 MG/DL (ref ?–150)
VLDLC SERPL CALC-MCNC: 50 MG/DL
WBC # BLD AUTO: 7 K/UL (ref 4.6–13.2)

## 2019-07-17 PROCEDURE — 36415 COLL VENOUS BLD VENIPUNCTURE: CPT

## 2019-07-17 PROCEDURE — 85025 COMPLETE CBC W/AUTO DIFF WBC: CPT

## 2019-07-17 PROCEDURE — 82043 UR ALBUMIN QUANTITATIVE: CPT

## 2019-07-17 PROCEDURE — 80061 LIPID PANEL: CPT

## 2019-07-17 PROCEDURE — 80053 COMPREHEN METABOLIC PANEL: CPT

## 2019-07-17 PROCEDURE — 83036 HEMOGLOBIN GLYCOSYLATED A1C: CPT

## 2019-07-23 ENCOUNTER — OFFICE VISIT (OUTPATIENT)
Dept: FAMILY MEDICINE CLINIC | Facility: CLINIC | Age: 49
End: 2019-07-23

## 2019-07-23 VITALS
SYSTOLIC BLOOD PRESSURE: 120 MMHG | HEART RATE: 83 BPM | TEMPERATURE: 98.1 F | WEIGHT: 221.8 LBS | HEIGHT: 65 IN | BODY MASS INDEX: 36.96 KG/M2 | DIASTOLIC BLOOD PRESSURE: 70 MMHG | OXYGEN SATURATION: 98 % | RESPIRATION RATE: 12 BRPM

## 2019-07-23 DIAGNOSIS — H61.23 CERUMEN DEBRIS ON TYMPANIC MEMBRANE, BILATERAL: ICD-10-CM

## 2019-07-23 DIAGNOSIS — I10 ESSENTIAL HYPERTENSION: Primary | ICD-10-CM

## 2019-07-23 DIAGNOSIS — M54.50 LUMBAR BACK PAIN: ICD-10-CM

## 2019-07-23 DIAGNOSIS — E78.2 MIXED HYPERLIPIDEMIA: ICD-10-CM

## 2019-07-23 RX ORDER — TELMISARTAN 40 MG/1
TABLET ORAL
Qty: 90 TAB | Refills: 3 | Status: SHIPPED | OUTPATIENT
Start: 2019-07-23 | End: 2020-07-30 | Stop reason: SDUPTHER

## 2019-07-23 RX ORDER — ATORVASTATIN CALCIUM 20 MG/1
20 TABLET, FILM COATED ORAL DAILY
Qty: 90 TAB | Refills: 1 | Status: SHIPPED | OUTPATIENT
Start: 2019-07-23 | End: 2020-02-18 | Stop reason: ALTCHOICE

## 2019-07-23 NOTE — PROGRESS NOTES
Alec Ngo presents today for No chief complaint on file. Alec Ngo preferred language for health care discussion is english. Is someone accompanying this pt? no    Is the patient using any DME equipment during [address]? no    Depression Screening:  3 most recent PHQ Screens 1/15/2019   Little interest or pleasure in doing things Not at all   Feeling down, depressed, irritable, or hopeless Not at all   Total Score PHQ 2 0       Learning Assessment:  Learning Assessment 2/5/2018   PRIMARY LEARNER Patient   HIGHEST LEVEL OF EDUCATION - PRIMARY LEARNER  -   BARRIERS PRIMARY LEARNER -   [facility]    NEED -   LEARNER PREFERENCE PRIMARY DEMONSTRATION   ANSWERED BY Patient   RELATIONSHIP SELF       Abuse Screening:  Abuse Screening Questionnaire 7/18/2016   Do you ever feel afraid of your partner? N   Are you in a relationship with someone who physically or mentally threatens you? N   Is it safe for you to go home? Y       Health Maintenance reviewed and discussed and ordered per Provider. Health Maintenance Due   Topic Date Due    Pneumococcal 0-64 years (1 of 1 - PPSV23) 11/09/1977    FOOT EXAM Q1  11/09/1981    EYE EXAM RETINAL OR DILATED  11/09/1981    DTaP/Tdap/Td series (1 - Tdap) 11/09/1992   . Alec Ngo is updated on all     Pt currently taking Antiplatelet therapy? Yes aspirin    Coordination of Care:  1. Have you been to the ER, urgent care clinic since your last visit? no  Hospitalized since your last visit? no    2. Have you seen or consulted any other health care providers outside of the [address] [place] [address] since your last visit? non Include any pap smears or colon screening.  no

## 2019-07-23 NOTE — PROGRESS NOTES
Elvis Sanchez is a 53 y.o. male presenting today for Ear Fullness (right)  . HPI:  Elvis Sanchez presents to the office today for ear fullness, right x 4 days. Patient notes he woke up this morning unable to hear out of his right ear. He is complaining of tinnutis predominantly in the right but also in the left. He denies any fever or congestion. He notes he previously had a blocked ear in the past x 1 year ago and it was flushed and his symptoms improved. Associated symptoms include pressure in his right ear. Patient is also here to complete intermittent FMLA paperwork associated with his chronic lumbar pain. He previously had lumbar surgery and notes he continues to have intermittent lumbar pain that prevents him from working. The pain is more severe in the a.m and he has difficulty getting out of the bed. Patient notes he was recently seen by Dr. Lisa Santillan and reports he was told he had chronic arthritis and will require intermittent steroid injections. Patient notes he had fasting labs done at [facility] last week and he presents today for his results. He has a history of hypertension and denies any chest pain or palpations. He notes compliance with taking his medication daily and his blood pressure is controlled. Review of Systems   HENT: Positive for ear pain (pressure) and tinnitus. Respiratory: Negative for cough. Cardiovascular: Negative for chest pain, palpitations and leg swelling. Musculoskeletal: Positive for back pain and myalgias. Neurological: Negative for dizziness and headaches. Allergies   Allergen Reactions    Iodine Unknown (comments)       Current Outpatient Medications   Medication Sig Dispense Refill    telmisartan (MICARDIS) 40 mg tablet TAKE 1 TABLET BY MOUTH ONCE DAILY STOP  VALSTARTAN 90 Tab 3    atorvastatin (LIPITOR) 20 mg tablet Take 1 Tab by mouth daily.  90 Tab 1    metFORMIN (GLUCOPHAGE) 500 mg tablet TAKE 1 TABLET BY MOUTH TWICE DAILY WITH MEALS 60 Tab 1    ibuprofen (MOTRIN) 800 mg tablet TAKE 1 TABLET BY MOUTH EVERY 8 HOURS AS NEEDED FOR PAIN 90 Tab 0    labetalol (NORMODYNE) 100 mg tablet TAKE 1 TABLET BY MOUTH TWICE DAILY ([address]). 180 Tab 1    gabapentin (NEURONTIN) 600 mg tablet TAKE ONE TABLET BY MOUTH THREE TIMES DAILY  Indications: NEUROPATHIC PAIN 90 Tab 2    aspirin delayed-release 81 mg tablet Take  by mouth daily.  traMADol (ULTRAM) 50 mg tablet Take 1 Tab by mouth every six (6) hours as needed for Pain. Max Daily Amount: 200 mg. 24 Tab 1    tiZANidine (ZANAFLEX) 2 mg tablet TAKE 1 TABLET BY MOUTH THREE TIMES DAILY AS NEEDED 90 Tab 0       Past Medical History:   Diagnosis Date    Acute sinusitis     BBB (bundle branch block)     Bursitis disorder     Cardiac cath 07/30/2012    R dom. Patent coronary arteries. LVEDP 15.  RA 10.  RV 32/10. PA 32/18. Mean PASP 22. W 14.  CO 5.7 (TD). High CO & high O2 sats on right raise question of peripheral arterial venous shunting.  Cardiac echocardiogram 06/27/2012    EF 50-55%. Dyssynergic septal motion c/w LBBB. RVSP/PASP 50-55. Mild LAE. Mild-mod TR. Mild IVCE. 30 x 5-mm echodensity in interventricular groove (poss fibrinous deposit). Sm pericardial effus at apex.  Hx of cardiac cath     Braeden's Daughters at about 12years old.     Hypertension     Obesity     Other ill-defined conditions(799.89)     heart cath 2012    Skin lesion     Umbilical hernia without obstruction or gangrene     Unspecified sleep apnea     no diagnosed       Past Surgical History:   Procedure Laterality Date    HX HEART CATHETERIZATION  07/30/12    HX HEENT      dental extract    HX LUMBAR LAMINECTOMY  02/08/2018    L4 laminectomy, L5 laminectomy       Social History     Socioeconomic History    Marital status:      Spouse name: Not on file    Number of children: Not on file    Years of education: Not on file    Highest education level: Not on file   Occupational History    Not on file   Social Needs    Financial resource strain: Not on file    Food insecurity:     Worry: Not on file     Inability: Not on file    Transportation needs:     Medical: Not on file     Non-medical: Not on file   Tobacco Use    Smoking status: Never Smoker    Smokeless tobacco: Never Used   Substance and Sexual Activity    Alcohol use: Yes     Alcohol/week: 0.0 standard drinks     Types: 2 - 3 Standard drinks or equivalent per week     Comment: \"few drinks every evening\"    Drug use: No    Sexual activity: Yes     Partners: Female   Lifestyle    Physical activity:     Days per week: Not on file     Minutes per session: Not on file    Stress: Not on file   Relationships    Social connections:     Talks on phone: Not on file     Gets together: Not on file     Attends Jewish service: Not on file     Active member of club or organization: Not on file     Attends meetings of clubs or organizations: Not on file     Relationship status: Not on file    Intimate partner violence:     Fear of current or ex partner: Not on file     Emotionally abused: Not on file     Physically abused: Not on file     Forced sexual activity: Not on file   Other Topics Concern    Not on file   Social History Narrative    Not on file       Patient does not have an advanced directive on file    Vitals:    07/23/19 0903   BP: 120/70   Pulse: 83   Resp: 12   Temp: 98.1 °F (36.7 °C)   TempSrc: Tympanic   SpO2: 98%   Weight: 221 lb 12.8 oz (100.6 kg)   Height: 5' 5\" (1.651 m)   PainSc:   3   PainLoc: Back       Physical Exam   Constitutional: No distress. HENT:   Right Ear: External ear normal. Decreased hearing (cerumen debris) is noted. Left Ear: External ear normal. No decreased hearing (cerumen debris) is noted. Cardiovascular: Normal rate, regular rhythm and normal heart sounds. Pulmonary/Chest: Effort normal and breath sounds normal.   Neurological: He is alert.    Nursing note and vitals reviewed. Hospital Outpatient Visit on 07/17/2019   Component Date Value Ref Range Status    WBC 07/17/2019 7.0  4.6 - 13.2 K/uL Final    RBC 07/17/2019 5.18  4.70 - 5.50 M/uL Final    HGB 07/17/2019 15.5  13.0 - 16.0 g/dL Final    HCT 07/17/2019 44.1  36.0 - 48.0 % Final    MCV 07/17/2019 85.1  74.0 - 97.0 FL Final    MCH 07/17/2019 29.9  24.0 - 34.0 PG Final    MCHC 07/17/2019 35.1  31.0 - 37.0 g/dL Final    RDW 07/17/2019 12.6  11.6 - 14.5 % Final    PLATELET 95/72/3787 454  135 - 420 K/uL Final    MPV 07/17/2019 10.2  9.2 - 11.8 FL Final    NEUTROPHILS 07/17/2019 60  40 - 73 % Final    LYMPHOCYTES 07/17/2019 26  21 - 52 % Final    MONOCYTES 07/17/2019 10  3 - 10 % Final    EOSINOPHILS 07/17/2019 3  0 - 5 % Final    BASOPHILS 07/17/2019 1  0 - 2 % Final    ABS. NEUTROPHILS 07/17/2019 4.2  1.8 - 8.0 K/UL Final    ABS. LYMPHOCYTES 07/17/2019 1.9  0.9 - 3.6 K/UL Final    ABS. MONOCYTES 07/17/2019 0.7  0.05 - 1.2 K/UL Final    ABS. EOSINOPHILS 07/17/2019 0.2  0.0 - 0.4 K/UL Final    ABS. BASOPHILS 07/17/2019 0.0  0.0 - 0.1 K/UL Final    DF 07/17/2019 AUTOMATED    Final    Sodium 07/17/2019 137  136 - 145 mmol/L Final    Potassium 07/17/2019 4.5  3.5 - 5.5 mmol/L Final    Chloride 07/17/2019 104  100 - 108 mmol/L Final    CO2 07/17/2019 27  21 - 32 mmol/L Final    Anion gap 07/17/2019 6  3.0 - 18 mmol/L Final    Glucose 07/17/2019 161* 74 - 99 mg/dL Final    BUN 07/17/2019 12  7.0 - 18 MG/DL Final    Creatinine 07/17/2019 0.93  0.6 - 1.3 MG/DL Final    BUN/Creatinine ratio 07/17/2019 13  12 - 20   Final    GFR est AA 07/17/2019 >60  >60 ml/min/1.73m2 Final    GFR est non-AA 07/17/2019 >60  >60 ml/min/1.73m2 Final    Comment: (NOTE)  Estimated GFR is calculated using the Modification of Diet in Renal   Disease (MDRD) Study equation, reported for both  Americans   (GFRAA) and non- Americans (GFRNA), and normalized to 1.73m2   body surface area.  The physician must decide which value applies to   the patient. The MDRD study equation should only be used in   individuals age 25 or older. It has not been validated for the   following: pregnant women, patients with serious comorbid conditions,   or on certain medications, or persons with extremes of body size,   muscle mass, or nutritional status.  Calcium 07/17/2019 9.0  8.5 - 10.1 MG/DL Final    Bilirubin, total 07/17/2019 0.4  0.2 - 1.0 MG/DL Final    ALT (SGPT) 07/17/2019 52  16 - 61 U/L Final    AST (SGOT) 07/17/2019 24  15 - 37 U/L Final    Alk. phosphatase 07/17/2019 75  45 - 117 U/L Final    Protein, total 07/17/2019 6.8  6.4 - 8.2 g/dL Final    Albumin 07/17/2019 4.0  3.4 - 5.0 g/dL Final    Globulin 07/17/2019 2.8  2.0 - 4.0 g/dL Final    A-G Ratio 07/17/2019 1.4  0.8 - 1.7   Final    LIPID PROFILE 07/17/2019        Final    Cholesterol, total 07/17/2019 230* <200 MG/DL Final    Triglyceride 07/17/2019 250* <150 MG/DL Final    Comment: The drugs N-acetylcysteine (NAC) and  Metamiszole have been found to cause falsely  low results in this chemical assay. Please  be sure to submit blood samples obtained  BEFORE administration of either of these  drugs to assure correct results.  HDL Cholesterol 07/17/2019 38* 40 - 60 MG/DL Final    LDL, calculated 07/17/2019 142* 0 - 100 MG/DL Final    VLDL, calculated 07/17/2019 50  MG/DL Final    CHOL/HDL Ratio 07/17/2019 6.1* 0 - 5.0   Final    Hemoglobin A1c 07/17/2019 7.3* 4.2 - 5.6 % Final    Comment: (NOTE)  HbA1C Interpretive Ranges  <5.7              Normal  5.7 - 6.4         Consider Prediabetes  >6.5              Consider Diabetes      Est. average glucose 07/17/2019 163  mg/dL Final    Comment: (NOTE)  The eAG should be interpreted with patient characteristics in mind   since ethnicity, interindividual differences, red cell lifespan,   variation in rates of glycation, etc. may affect the validity of the   calculation.       Microalbumin,urine random 07/17/2019 2.25  0 - 3.0 MG/DL Final    Creatinine, urine 07/17/2019 211.00* 30 - 125 mg/dL Final    Microalbumin/Creat ratio (mg/g cre* 07/17/2019 11  0 - 30 mg/g Final       .No results found for any visits on 07/23/19. Assessment / Plan:      ICD-10-CM ICD-9-CM    1. Essential hypertension I10 401.9    2. Cerumen debris on tympanic membrane, bilateral H61.23 380.4 REMOVAL IMPACTED CERUMEN IRRIGATION/LVG UNILAT   3. Mixed hyperlipidemia E78.2 272.2 atorvastatin (LIPITOR) 20 mg tablet   4. Lumbar back pain M54.5 724.2      Mixed Hyperlipidemia- Atorvastatin 20 mg rx started  Follow-up and Dispositions    · Return in about 4 months (around 11/23/2019), or if symptoms worsen or fail to improve. HTN- controlled  FMLA form remains in the practice until completed by the provider  Chronic lumbar back     I asked the patient if he  had any questions and answered his  questions. The patient stated that he understands the treatment plan and agrees with the treatment plan    This document was created with a voice activated dictation system and may contain transcription errors.

## 2019-07-25 ENCOUNTER — TELEPHONE (OUTPATIENT)
Dept: FAMILY MEDICINE CLINIC | Facility: CLINIC | Age: 49
End: 2019-07-25

## 2019-07-25 NOTE — TELEPHONE ENCOUNTER
Called patient to inform him that his [place] papers were ready to be picked up. Could not leave VM it is not set up. Leaving forms at the .

## 2019-08-11 DIAGNOSIS — I10 ESSENTIAL HYPERTENSION: ICD-10-CM

## 2019-08-11 RX ORDER — IBUPROFEN 800 MG/1
TABLET ORAL
Qty: 90 TAB | Refills: 0 | Status: SHIPPED | OUTPATIENT
Start: 2019-08-11 | End: 2019-12-11 | Stop reason: SDUPTHER

## 2019-08-12 RX ORDER — LABETALOL 100 MG/1
TABLET, FILM COATED ORAL
Qty: 180 TAB | Refills: 1 | Status: SHIPPED | OUTPATIENT
Start: 2019-08-12 | End: 2020-04-02 | Stop reason: SDUPTHER

## 2019-09-01 DIAGNOSIS — E11.21 TYPE II DIABETES MELLITUS WITH NEPHROPATHY (HCC): ICD-10-CM

## 2019-09-02 RX ORDER — METFORMIN HYDROCHLORIDE 500 MG/1
TABLET ORAL
Qty: 60 TAB | Refills: 1 | Status: SHIPPED | OUTPATIENT
Start: 2019-09-02 | End: 2019-11-24 | Stop reason: SDUPTHER

## 2019-10-02 ENCOUNTER — TELEPHONE (OUTPATIENT)
Dept: FAMILY MEDICINE CLINIC | Facility: CLINIC | Age: 49
End: 2019-10-02

## 2019-10-04 ENCOUNTER — HOSPITAL ENCOUNTER (OUTPATIENT)
Dept: LAB | Age: 49
Discharge: HOME | End: 2019-10-04
Payer: COMMERCIAL

## 2019-10-04 ENCOUNTER — OFFICE VISIT (OUTPATIENT)
Dept: FAMILY MEDICINE CLINIC | Facility: CLINIC | Age: 49
End: 2019-10-04

## 2019-10-04 VITALS
SYSTOLIC BLOOD PRESSURE: 130 MMHG | OXYGEN SATURATION: 98 % | HEIGHT: 65 IN | BODY MASS INDEX: 36.49 KG/M2 | DIASTOLIC BLOOD PRESSURE: 73 MMHG | WEIGHT: 219 LBS | HEART RATE: 83 BPM | TEMPERATURE: 98 F | RESPIRATION RATE: 12 BRPM

## 2019-10-04 DIAGNOSIS — E11.9 CONTROLLED TYPE 2 DIABETES MELLITUS WITHOUT COMPLICATION, WITHOUT LONG-TERM CURRENT USE OF INSULIN (HCC): ICD-10-CM

## 2019-10-04 DIAGNOSIS — R20.2 PARESTHESIA: Primary | ICD-10-CM

## 2019-10-04 DIAGNOSIS — G89.29 CHRONIC BILATERAL LOW BACK PAIN WITHOUT SCIATICA: ICD-10-CM

## 2019-10-04 DIAGNOSIS — E78.2 MIXED HYPERLIPIDEMIA: ICD-10-CM

## 2019-10-04 DIAGNOSIS — I10 ESSENTIAL HYPERTENSION: ICD-10-CM

## 2019-10-04 DIAGNOSIS — R20.2 PARESTHESIA: ICD-10-CM

## 2019-10-04 DIAGNOSIS — M54.50 CHRONIC BILATERAL LOW BACK PAIN WITHOUT SCIATICA: ICD-10-CM

## 2019-10-04 PROBLEM — E11.40 TYPE 2 DIABETES MELLITUS WITH DIABETIC NEUROPATHY (HCC): Status: ACTIVE | Noted: 2019-10-04

## 2019-10-04 LAB
ANION GAP SERPL CALC-SCNC: 6 MMOL/L (ref 3–18)
BUN SERPL-MCNC: 14 MG/DL (ref 7–18)
BUN/CREAT SERPL: 16 (ref 12–20)
CALCIUM SERPL-MCNC: 10 MG/DL (ref 8.5–10.1)
CHLORIDE SERPL-SCNC: 103 MMOL/L (ref 100–111)
CO2 SERPL-SCNC: 27 MMOL/L (ref 21–32)
CREAT SERPL-MCNC: 0.88 MG/DL (ref 0.6–1.3)
ERYTHROCYTE [SEDIMENTATION RATE] IN BLOOD: 2 MM/HR (ref 0–15)
GLUCOSE SERPL-MCNC: 202 MG/DL (ref 74–99)
POTASSIUM SERPL-SCNC: 4.6 MMOL/L (ref 3.5–5.5)
SODIUM SERPL-SCNC: 136 MMOL/L (ref 136–145)

## 2019-10-04 PROCEDURE — 85652 RBC SED RATE AUTOMATED: CPT

## 2019-10-04 PROCEDURE — 86140 C-REACTIVE PROTEIN: CPT

## 2019-10-04 PROCEDURE — 83921 ORGANIC ACID SINGLE QUANT: CPT

## 2019-10-04 PROCEDURE — 80048 BASIC METABOLIC PNL TOTAL CA: CPT

## 2019-10-04 PROCEDURE — 36415 COLL VENOUS BLD VENIPUNCTURE: CPT

## 2019-10-04 RX ORDER — DULOXETIN HYDROCHLORIDE 20 MG/1
CAPSULE, DELAYED RELEASE ORAL
Qty: 30 CAP | Refills: 3 | Status: SHIPPED | OUTPATIENT
Start: 2019-10-04 | End: 2020-10-13

## 2019-10-04 NOTE — LETTER
NOTIFICATION RETURN TO WORK / SCHOOL 
 
10/4/2019 3:39 PM 
 
Mr. Jim Stanton [facility] [address] [address] To Whom It May Concern: Jim Stanton is currently under the care of [address]. He will return to work/school on 10/07/19. If there are questions or concerns please have the patient contact our office. Sincerely, Florecita Carney MD

## 2019-10-04 NOTE — PROGRESS NOTES
Visit Vitals  /73   Pulse 83   Temp 98 °F (36.7 °C) (Oral)   Resp 12   Ht 5' 5\" (1.651 m)   Wt 219 lb (99.3 kg)   SpO2 98%   BMI 36.44 kg/m²     Vi Dane presents today for   Chief Complaint   Patient presents with    Tingling     patient stated they have numbness that travels down both sides of his body. Numbness in his face. that started a few weeks ago       Is someone accompanying this pt? no    Is the patient using any DME equipment during [place]? no    Depression Screening:  3 most recent PHQ Screens 10/4/2019   Little interest or pleasure in doing things Not at all   Feeling down, depressed, irritable, or hopeless Not at all   Total Score PHQ 2 0       Learning Assessment:  Learning Assessment 2/5/2018   PRIMARY LEARNER Patient   HIGHEST LEVEL OF EDUCATION - PRIMARY LEARNER  -   BARRIERS PRIMARY LEARNER -   [address] [place] [address]    NEED -   LEARNER PREFERENCE PRIMARY DEMONSTRATION   ANSWERED BY Patient   RELATIONSHIP SELF       Abuse Screening:  Abuse Screening Questionnaire 7/18/2016   Do you ever feel afraid of your partner? N   Are you in a relationship with someone who physically or mentally threatens you? N   Is it safe for you to go home? Y       Fall Risk  Fall Risk Assessment, last 12 mths 7/18/2016   Able to walk? Yes   Fall in past 12 months? No       Health Maintenance reviewed and discussed and ordered per Provider. Health Maintenance Due   Topic Date Due    Pneumococcal 0-64 years (1 of 1 - PPSV23) 11/09/1977    FOOT EXAM Q1  11/09/1981    EYE EXAM RETINAL OR DILATED  11/09/1981    DTaP/Tdap/Td series (1 - Tdap) 11/09/1992    Influenza Age 5 to Adult  08/01/2019           Coordination of Care:  1. Have you been to the ER, urgent care clinic since your last visit? Hospitalized since your last visit? no    2. Have you seen or consulted any other health care providers outside of the [address] [place] [address] since your last visit?  Include any pap smears or colon screening.  no

## 2019-10-05 LAB — CRP SERPL-MCNC: 0.4 MG/DL (ref 0–0.3)

## 2019-10-08 LAB
Lab: NORMAL
METHYLMALONATE SERPL-SCNC: 172 NMOL/L (ref 0–378)

## 2019-10-08 NOTE — PROGRESS NOTES
The patient presents to the office today with the chief complaint of paresthesias    HPI    The patient remains on Metformin for type 2 diabetes mellitus. Patient is tolerating medication well. The patient complains of several months of tingling in his hands and his feet. There is no associated numbness. Th there is no clear pain. The patient continues with chronic low back pain. There is been no radiation to the pain. The patient remains on Lipitor for hyperlipidemia. The patient is tolerating statin well. The patient remains on labetalol for hypertension. The patient's blood pressure has been doing well. ROS  Positive for back pain and paresthesias as above. Negative for chest pain, dyspnea, lower extremity edema    Allergies   Allergen Reactions    Iodine Unknown (comments)       Current Outpatient Medications   Medication Sig Dispense Refill    DULoxetine (CYMBALTA) 20 mg capsule 1 capsule daily 30 Cap 3    metFORMIN (GLUCOPHAGE) 500 mg tablet TAKE 1 TABLET BY MOUTH TWICE DAILY WITH MEALS 60 Tab 1    labetalol (NORMODYNE) 100 mg tablet TAKE 1 TABLET BY MOUTH TWICE DAILY (GENERIC  FOR  NORMODYNE) 180 Tab 1    ibuprofen (MOTRIN) 800 mg tablet TAKE 1 TABLET BY MOUTH EVERY 8 HOURS AS NEEDED FOR PAIN 90 Tab 0    telmisartan (MICARDIS) 40 mg tablet TAKE 1 TABLET BY MOUTH ONCE DAILY STOP  VALSTARTAN 90 Tab 3    atorvastatin (LIPITOR) 20 mg tablet Take 1 Tab by mouth daily. 90 Tab 1    traMADol (ULTRAM) 50 mg tablet Take 1 Tab by mouth every six (6) hours as needed for Pain. Max Daily Amount: 200 mg. 24 Tab 1    tiZANidine (ZANAFLEX) 2 mg tablet TAKE 1 TABLET BY MOUTH THREE TIMES DAILY AS NEEDED 90 Tab 0    aspirin delayed-release 81 mg tablet Take  by mouth daily. Past Medical History:   Diagnosis Date    Acute sinusitis     BBB (bundle branch block)     Bursitis disorder     Cardiac cath 07/30/2012    R dom. Patent coronary arteries. LVEDP 15.  RA 10.  RV 32/10. PA 32/18. Mean PASP 22. W 14.  CO 5.7 (TD). High CO & high O2 sats on right raise question of peripheral arterial venous shunting.  Cardiac echocardiogram 06/27/2012    EF 50-55%. Dyssynergic septal motion c/w LBBB. RVSP/PASP 50-55. Mild LAE. Mild-mod TR. Mild IVCE. 30 x 5-mm echodensity in interventricular groove (poss fibrinous deposit). Sm pericardial effus at apex.  Hx of cardiac cath     [facility] Daughters at about 12years old.  Hypertension     Obesity     Other ill-defined conditions(799.89)     heart cath 2012    Skin lesion     Umbilical hernia without obstruction or gangrene     Unspecified sleep apnea     no diagnosed       Past Surgical History:   Procedure Laterality Date    HX HEART CATHETERIZATION  07/30/12    HX HEENT      dental extract    HX LUMBAR LAMINECTOMY  02/08/2018    L4 laminectomy, L5 laminectomy       Social History     Socioeconomic History    Marital status:      Spouse name: Not on file    Number of children: Not on file    Years of education: Not on file    Highest education level: Not on file   Occupational History    Not on file   Social Needs    Financial resource strain: Not on file    Food insecurity:     Worry: Not on file     Inability: Not on file    Transportation needs:     Medical: Not on file     Non-medical: Not on file   Tobacco Use    Smoking status: Never Smoker    Smokeless tobacco: Never Used   Substance and Sexual Activity    Alcohol use:  Yes     Alcohol/week: 0.0 standard drinks     Types: 2 - 3 Standard drinks or equivalent per week     Comment: \"few drinks every evening\"    Drug use: No    Sexual activity: Yes     Partners: Female   Lifestyle    Physical activity:     Days per week: Not on file     Minutes per session: Not on file    Stress: Not on file   Relationships    Social connections:     Talks on phone: Not on file     Gets together: Not on file     Attends [employer] service: Not on file     Active member of club or organization: Not on file     Attends meetings of clubs or organizations: Not on file     Relationship status: Not on file    Intimate partner violence:     Fear of current or ex partner: Not on file     Emotionally abused: Not on file     Physically abused: Not on file     Forced sexual activity: Not on file   Other Topics Concern    Not on file   Social History Narrative    Not on file       Patient does not have an advanced directive on file    Visit Vitals  /73   Pulse 83   Temp 98 °F (36.7 °C) (Oral)   Resp 12   Ht 5' 5\" (1.651 m)   Wt 219 lb (99.3 kg)   SpO2 98%   BMI 36.44 kg/m²       Physical Exam  No Cervical Lymphadenopathy  No Supraclavicular Lymphadenopathy  Thyroid is Normal  Lungs are normal to percussion. Clear to auscultation   Heart:  S1 S2 are normal, No gallops, No murmurs  No Carotid Bruits  Abdomen:  Normal Bowel Sounds. No tenderness. No masses. No Hepatomegaly or Splenomegaly  LE:  Strong Pedal Pulses. No Edema  Diabetic foot exam:     Left Foot:   Visual Exam: normal    Pulse DP: 2+ (normal)   Filament test: normal sensation    Vibratory sensation: normal      Right Foot:   Visual Exam: normal    Pulse DP: 2+ (normal)   Filament test: normal sensation    Vibratory sensation: normal      BMI: The BMI is high.   Patient was advised to limit calories to 2000 talha daily and carbohydrates to 100 g daily    Hospital Outpatient Visit on 10/04/2019   Component Date Value Ref Range Status    Sodium 10/04/2019 136  136 - 145 mmol/L Final    Potassium 10/04/2019 4.6  3.5 - 5.5 mmol/L Final    Chloride 10/04/2019 103  100 - 111 mmol/L Final    CO2 10/04/2019 27  21 - 32 mmol/L Final    Anion gap 10/04/2019 6  3.0 - 18 mmol/L Final    Glucose 10/04/2019 202* 74 - 99 mg/dL Final    BUN 10/04/2019 14  7.0 - 18 MG/DL Final    Creatinine 10/04/2019 0.88  0.6 - 1.3 MG/DL Final    BUN/Creatinine ratio 10/04/2019 16  12 - 20   Final    GFR est AA 10/04/2019 >60  >60 ml/min/1.73m2 Final    GFR est non-AA 10/04/2019 >60  >60 ml/min/1.73m2 Final    Comment: (NOTE)  Estimated GFR is calculated using the Modification of Diet in Renal   Disease (MDRD) Study equation, reported for both  Americans   (GFRAA) and non- Americans (GFRNA), and normalized to 1.73m2   body surface area. The physician must decide which value applies to   the patient. The MDRD study equation should only be used in   individuals age 25 or older. It has not been validated for the   following: pregnant women, patients with serious comorbid conditions,   or on certain medications, or persons with extremes of body size,   muscle mass, or nutritional status.  Calcium 10/04/2019 10.0  8.5 - 10.1 MG/DL Final    C-Reactive protein 10/04/2019 0.4* 0 - 0.3 mg/dL Final    Sed rate, automated 10/04/2019 2  0 - 15 mm/hr Final   Hospital Outpatient Visit on 07/17/2019   Component Date Value Ref Range Status    WBC 07/17/2019 7.0  4.6 - 13.2 K/uL Final    RBC 07/17/2019 5.18  4.70 - 5.50 M/uL Final    HGB 07/17/2019 15.5  13.0 - 16.0 g/dL Final    HCT 07/17/2019 44.1  36.0 - 48.0 % Final    MCV 07/17/2019 85.1  74.0 - 97.0 FL Final    MCH 07/17/2019 29.9  24.0 - 34.0 PG Final    MCHC 07/17/2019 35.1  31.0 - 37.0 g/dL Final    RDW 07/17/2019 12.6  11.6 - 14.5 % Final    PLATELET 75/70/6345 131  135 - 420 K/uL Final    MPV 07/17/2019 10.2  9.2 - 11.8 FL Final    NEUTROPHILS 07/17/2019 60  40 - 73 % Final    LYMPHOCYTES 07/17/2019 26  21 - 52 % Final    MONOCYTES 07/17/2019 10  3 - 10 % Final    EOSINOPHILS 07/17/2019 3  0 - 5 % Final    BASOPHILS 07/17/2019 1  0 - 2 % Final    ABS. NEUTROPHILS 07/17/2019 4.2  1.8 - 8.0 K/UL Final    ABS. LYMPHOCYTES 07/17/2019 1.9  0.9 - 3.6 K/UL Final    ABS. MONOCYTES 07/17/2019 0.7  0.05 - 1.2 K/UL Final    ABS. EOSINOPHILS 07/17/2019 0.2  0.0 - 0.4 K/UL Final    ABS.  BASOPHILS 07/17/2019 0.0  0.0 - 0.1 K/UL Final    DF 07/17/2019 AUTOMATED    Final    Sodium 07/17/2019 137  136 - 145 mmol/L Final    Potassium 07/17/2019 4.5  3.5 - 5.5 mmol/L Final    Chloride 07/17/2019 104  100 - 108 mmol/L Final    CO2 07/17/2019 27  21 - 32 mmol/L Final    Anion gap 07/17/2019 6  3.0 - 18 mmol/L Final    Glucose 07/17/2019 161* 74 - 99 mg/dL Final    BUN 07/17/2019 12  7.0 - 18 MG/DL Final    Creatinine 07/17/2019 0.93  0.6 - 1.3 MG/DL Final    BUN/Creatinine ratio 07/17/2019 13  12 - 20   Final    GFR est AA 07/17/2019 >60  >60 ml/min/1.73m2 Final    GFR est non-AA 07/17/2019 >60  >60 ml/min/1.73m2 Final    Comment: (NOTE)  Estimated GFR is calculated using the Modification of Diet in Renal   Disease (MDRD) Study equation, reported for both  Americans   (GFRAA) and non- Americans (GFRNA), and normalized to 1.73m2   body surface area. The physician must decide which value applies to   the patient. The MDRD study equation should only be used in   individuals age 25 or older. It has not been validated for the   following: pregnant women, patients with serious comorbid conditions,   or on certain medications, or persons with extremes of body size,   muscle mass, or nutritional status.  Calcium 07/17/2019 9.0  8.5 - 10.1 MG/DL Final    Bilirubin, total 07/17/2019 0.4  0.2 - 1.0 MG/DL Final    ALT (SGPT) 07/17/2019 52  16 - 61 U/L Final    AST (SGOT) 07/17/2019 24  15 - 37 U/L Final    Alk. phosphatase 07/17/2019 75  45 - 117 U/L Final    Protein, total 07/17/2019 6.8  6.4 - 8.2 g/dL Final    Albumin 07/17/2019 4.0  3.4 - 5.0 g/dL Final    Globulin 07/17/2019 2.8  2.0 - 4.0 g/dL Final    A-G Ratio 07/17/2019 1.4  0.8 - 1.7   Final    LIPID PROFILE 07/17/2019        Final    Cholesterol, total 07/17/2019 230* <200 MG/DL Final    Triglyceride 07/17/2019 250* <150 MG/DL Final    Comment: The drugs N-acetylcysteine (NAC) and  Metamiszole have been found to cause falsely  low results in this chemical assay.  Please  be sure to submit blood samples obtained  BEFORE administration of either of these  drugs to assure correct results.  HDL Cholesterol 07/17/2019 38* 40 - 60 MG/DL Final    LDL, calculated 07/17/2019 142* 0 - 100 MG/DL Final    VLDL, calculated 07/17/2019 50  MG/DL Final    CHOL/HDL Ratio 07/17/2019 6.1* 0 - 5.0   Final    Hemoglobin A1c 07/17/2019 7.3* 4.2 - 5.6 % Final    Comment: (NOTE)  HbA1C Interpretive Ranges  <5.7              Normal  5.7 - 6.4         Consider Prediabetes  >6.5              Consider Diabetes      Est. average glucose 07/17/2019 163  mg/dL Final    Comment: (NOTE)  The eAG should be interpreted with patient characteristics in mind   since ethnicity, interindividual differences, red cell lifespan,   variation in rates of glycation, etc. may affect the validity of the   calculation.  Microalbumin,urine random 07/17/2019 2.25  0 - 3.0 MG/DL Final    Creatinine, urine 07/17/2019 211.00* 30 - 125 mg/dL Final    Microalbumin/Creat ratio (mg/g cre* 07/17/2019 11  0 - 30 mg/g Final       .  Results for orders placed or performed during the hospital encounter of 52/20/89   METABOLIC PANEL, BASIC   Result Value Ref Range    Sodium 136 136 - 145 mmol/L    Potassium 4.6 3.5 - 5.5 mmol/L    Chloride 103 100 - 111 mmol/L    CO2 27 21 - 32 mmol/L    Anion gap 6 3.0 - 18 mmol/L    Glucose 202 (H) 74 - 99 mg/dL    BUN 14 7.0 - 18 MG/DL    Creatinine 0.88 0.6 - 1.3 MG/DL    BUN/Creatinine ratio 16 12 - 20      GFR est AA >60 >60 ml/min/1.73m2    GFR est non-AA >60 >60 ml/min/1.73m2    Calcium 10.0 8.5 - 10.1 MG/DL   C REACTIVE PROTEIN, QT   Result Value Ref Range    C-Reactive protein 0.4 (H) 0 - 0.3 mg/dL   SED RATE (ESR)   Result Value Ref Range    Sed rate, automated 2 0 - 15 mm/hr       Assessment / Plan      ICD-10-CM ICD-9-CM    1. Paresthesia R20.2 782.0 METHYLMALONIC ACID      DUPLEX CAROTID BILATERAL      C REACTIVE PROTEIN, QT      SED RATE (ESR)   2. Essential hypertension M48 055.7 METABOLIC PANEL, BASIC   3.  Mixed hyperlipidemia E78.2 272.2    4. Controlled type 2 diabetes mellitus without complication, without long-term current use of insulin ([facility]) A93.7 773.81 METABOLIC PANEL, BASIC   5. Chronic bilateral low back pain without sciatica M54.5 724.2     G89.29 338.29      Duplex of carotid arteries was ordered. Labs ordered  he was advised to continue his maintenance medications  Further plans will be based on lab test results      Follow-up and Dispositions    · Return in about 2 weeks (around 10/18/2019). I asked Jennifer Olmos if he has any questions and I answered the questions. Jennifer Olmos states that he understands the treatment plan and agrees with the treatment plan          THIS DOCUMENT WAS CREATED WITH A VOICE ACTIVATED DICTATION SYSTEM.   IT MAY CONTAIN TRANSCRIPTION ERRORS

## 2019-10-15 ENCOUNTER — HOSPITAL ENCOUNTER (OUTPATIENT)
Dept: VASCULAR SURGERY | Age: 49
Discharge: HOME | End: 2019-10-15
Attending: INTERNAL MEDICINE
Payer: COMMERCIAL

## 2019-10-15 DIAGNOSIS — R20.2 PARESTHESIA: ICD-10-CM

## 2019-10-15 LAB
LEFT CCA DIST DIAS: 17.6 CM/S
LEFT CCA DIST SYS: 88.8 CM/S
LEFT CCA MID DIAS: 21.97 CM/S
LEFT CCA MID SYS: 109.18 CM/S
LEFT CCA PROX DIAS: 26.9 CM/S
LEFT CCA PROX SYS: 150 CM/S
LEFT ECA SYS: 81 CM/S
LEFT ICA DIST DIAS: 21.8 CM/S
LEFT ICA DIST SYS: 50.6 CM/S
LEFT ICA MID DIAS: 21.2 CM/S
LEFT ICA MID SYS: 46.5 CM/S
LEFT ICA PROX DIAS: 17.2 CM/S
LEFT ICA PROX SYS: 67.8 CM/S
LEFT ICA/CCA SYS: 0.76
LEFT SUBCLAVIAN SYS: 121.8 CM/S
LEFT VERTEBRAL DIAS: 19.19 CM/S
LEFT VERTEBRAL SYS: 58.2 CM/S
RIGHT CCA DIST DIAS: 15.5 CM/S
RIGHT CCA DIST SYS: 115.7 CM/S
RIGHT CCA MID DIAS: 24.62 CM/S
RIGHT CCA MID SYS: 138.43 CM/S
RIGHT CCA PROX DIAS: 22.3 CM/S
RIGHT CCA PROX SYS: 171 CM/S
RIGHT ECA SYS: 75.4 CM/S
RIGHT ICA DIST DIAS: 28.6 CM/S
RIGHT ICA DIST SYS: 82.4 CM/S
RIGHT ICA MID DIAS: 21.5 CM/S
RIGHT ICA MID SYS: 73.2 CM/S
RIGHT ICA PROX DIAS: 20.4 CM/S
RIGHT ICA PROX SYS: 82 CM/S
RIGHT ICA/CCA SYS: 0.7
RIGHT SUBCLAVIAN SYS: 106.7 CM/S
RIGHT VERTEBRAL DIAS: 15.37 CM/S
RIGHT VERTEBRAL SYS: 41 CM/S

## 2019-10-15 PROCEDURE — 93880 EXTRACRANIAL BILAT STUDY: CPT

## 2019-10-18 ENCOUNTER — OFFICE VISIT (OUTPATIENT)
Dept: FAMILY MEDICINE CLINIC | Facility: CLINIC | Age: 49
End: 2019-10-18

## 2019-10-18 VITALS
RESPIRATION RATE: 12 BRPM | HEIGHT: 65 IN | TEMPERATURE: 97 F | WEIGHT: 220 LBS | SYSTOLIC BLOOD PRESSURE: 138 MMHG | HEART RATE: 85 BPM | BODY MASS INDEX: 36.65 KG/M2 | DIASTOLIC BLOOD PRESSURE: 83 MMHG | OXYGEN SATURATION: 98 %

## 2019-10-18 DIAGNOSIS — M54.2 NECK PAIN: Primary | ICD-10-CM

## 2019-10-18 NOTE — PROGRESS NOTES
Catracho Almanzar presents today for   Chief Complaint   Patient presents with    Results     x-ray       Is someone accompanying this pt? no    Is the patient using any DME equipment during OV? no    Depression Screening:  3 most recent PHQ Screens 10/18/2019   Little interest or pleasure in doing things Not at all   Feeling down, depressed, irritable, or hopeless Not at all   Total Score PHQ 2 0       Learning Assessment:  Learning Assessment 2/5/2018   PRIMARY LEARNER Patient   HIGHEST LEVEL OF EDUCATION - PRIMARY LEARNER  -   BARRIERS PRIMARY LEARNER -   [facility]    NEED -   LEARNER PREFERENCE PRIMARY DEMONSTRATION   ANSWERED BY Patient   RELATIONSHIP SELF       Abuse Screening:  Abuse Screening Questionnaire 7/18/2016   Do you ever feel afraid of your partner? N   Are you in a relationship with someone who physically or mentally threatens you? N   Is it safe for you to go home? Y       Fall Risk  Fall Risk Assessment, last 12 mths 7/18/2016   Able to walk? Yes   Fall in past 12 months? No       Health Maintenance reviewed and discussed and ordered per Provider. Health Maintenance Due   Topic Date Due    Pneumococcal 0-64 years (1 of 1 - PPSV23) 11/09/1977    EYE EXAM RETINAL OR DILATED  11/09/1981    DTaP/Tdap/Td series (1 - Tdap) 11/09/1992    Influenza Age 5 to Adult  08/01/2019           Coordination of Care:  1. Have you been to the ER, urgent care clinic since your last visit? Hospitalized since your last visit? no    2. Have you seen or consulted any other health care providers outside of the [address] [place] [address] since your last visit? Include any pap smears or colon screening.  no

## 2019-10-18 NOTE — LETTER
10/18/19 RE:  Jaqueline Aguilar :  1970 To whom it may concern: 
 
 I am Doug Yoo's primary care physician. I last evaluated Mr. Bailey Freeman at the office on 10/18/19. Please excuse Jaqueline Aguilar from work today. Kat Alves is able to return to work on 10/19/19 without any restrictions. Sincerely, Som Longoria M.D.   FACP

## 2019-10-22 NOTE — PROGRESS NOTES
The patient presents to the office today with the chief complaint of neck pain    HPI    The patient is status work up of paresthesias in his left arm. The duplex of the carotid arteries and labs were normal. The patient complains of pain in his neck. Review of Systems   Respiratory: Negative for shortness of breath. Cardiovascular: Negative for chest pain and leg swelling. Musculoskeletal: Positive for neck pain. Allergies   Allergen Reactions    Iodine Unknown (comments)       Current Outpatient Medications   Medication Sig Dispense Refill    DULoxetine (CYMBALTA) 20 mg capsule 1 capsule daily 30 Cap 3    metFORMIN (GLUCOPHAGE) 500 mg tablet TAKE 1 TABLET BY MOUTH TWICE DAILY WITH MEALS 60 Tab 1    labetalol (NORMODYNE) 100 mg tablet TAKE 1 TABLET BY MOUTH TWICE DAILY (GENERIC  FOR  NORMODYNE) 180 Tab 1    ibuprofen (MOTRIN) 800 mg tablet TAKE 1 TABLET BY MOUTH EVERY 8 HOURS AS NEEDED FOR PAIN 90 Tab 0    telmisartan (MICARDIS) 40 mg tablet TAKE 1 TABLET BY MOUTH ONCE DAILY STOP  VALSTARTAN 90 Tab 3    atorvastatin (LIPITOR) 20 mg tablet Take 1 Tab by mouth daily. 90 Tab 1    traMADol (ULTRAM) 50 mg tablet Take 1 Tab by mouth every six (6) hours as needed for Pain. Max Daily Amount: 200 mg. 24 Tab 1    tiZANidine (ZANAFLEX) 2 mg tablet TAKE 1 TABLET BY MOUTH THREE TIMES DAILY AS NEEDED 90 Tab 0    aspirin delayed-release 81 mg tablet Take  by mouth daily. Past Medical History:   Diagnosis Date    Acute sinusitis     BBB (bundle branch block)     Bursitis disorder     Cardiac cath 07/30/2012    R dom. Patent coronary arteries. LVEDP 15.  RA 10.  RV 32/10. PA 32/18. Mean PASP 22. W 14.  CO 5.7 (TD). High CO & high O2 sats on right raise question of peripheral arterial venous shunting.  Cardiac echocardiogram 06/27/2012    EF 50-55%. Dyssynergic septal motion c/w LBBB. RVSP/PASP 50-55. Mild LAE. Mild-mod TR. Mild IVCE.   30 x 5-mm echodensity in interventricular groove (poss fibrinous deposit). Sm pericardial effus at apex.  Hx of cardiac cath     Braeden's Daughters at about 12years old.  Hypertension     Obesity     Other ill-defined conditions(799.89)     heart cath 2012    Skin lesion     Umbilical hernia without obstruction or gangrene     Unspecified sleep apnea     no diagnosed       Past Surgical History:   Procedure Laterality Date    HX HEART CATHETERIZATION  07/30/12    HX HEENT      dental extract    HX LUMBAR LAMINECTOMY  02/08/2018    L4 laminectomy, L5 laminectomy       Social History     Socioeconomic History    Marital status:      Spouse name: Not on file    Number of children: Not on file    Years of education: Not on file    Highest education level: Not on file   Occupational History    Not on file   Social Needs    Financial resource strain: Not on file    Food insecurity:     Worry: Not on file     Inability: Not on file    Transportation needs:     Medical: Not on file     Non-medical: Not on file   Tobacco Use    Smoking status: Never Smoker    Smokeless tobacco: Never Used   Substance and Sexual Activity    Alcohol use:  Yes     Alcohol/week: 0.0 standard drinks     Types: 2 - 3 Standard drinks or equivalent per week     Comment: \"few drinks every evening\"    Drug use: No    Sexual activity: Yes     Partners: Female   Lifestyle    Physical activity:     Days per week: Not on file     Minutes per session: Not on file    Stress: Not on file   Relationships    Social connections:     Talks on phone: Not on file     Gets together: Not on file     Attends [employer] service: Not on file     Active member of club or organization: Not on file     Attends meetings of clubs or organizations: Not on file     Relationship status: Not on file    Intimate partner violence:     Fear of current or ex partner: Not on file     Emotionally abused: Not on file     Physically abused: Not on file     Forced sexual activity: Not on file Other Topics Concern    Not on file   Social History Narrative    Not on file       Patient does not have an advanced directive on file    Visit Vitals  /83   Pulse 85   Temp 97 °F (36.1 °C) (Oral)   Resp 12   Ht 5' 5\" (1.651 m)   Wt 220 lb (99.8 kg)   SpO2 98%   BMI 36.61 kg/m²       Physical Exam   Neck: Carotid bruit is not present. Cardiovascular: Normal rate and regular rhythm. Exam reveals no gallop. No murmur heard. Pulmonary/Chest: He has no wheezes. He has no rales.    Musculoskeletal:   Neck:  Pain with rotation       Hospital Outpatient Visit on 10/15/2019   Component Date Value Ref Range Status    Right cca dist sys 10/15/2019 115.7  cm/s Final    Right CCA dist mora 10/15/2019 15.5  cm/s Final    RIGHT COMMON CAROTID ARTERY MID S 10/15/2019 138.43  cm/s Final    RIGHT COMMON CAROTID ARTERY MID D 10/15/2019 24.62  cm/s Final    Right CCA prox sys 10/15/2019 171.0  cm/s Final    Right CCA prox mora 10/15/2019 22.3  cm/s Final    Right ICA dist sys 10/15/2019 82.4  cm/s Final    Right ICA dist mora 10/15/2019 28.6  cm/s Final    Right ICA mid sys 10/15/2019 73.2  cm/s Final    Right ICA mid mora 10/15/2019 21.5  cm/s Final    Right ICA prox sys 10/15/2019 82.0  cm/s Final    Right ICA prox mora 10/15/2019 20.4  cm/s Final    Right vertebral sys 10/15/2019 41.0  cm/s Final    RIGHT VERTEBRAL ARTERY D 10/15/2019 15.37  cm/s Final    Right ICA/CCA sys 10/15/2019 0.7   Final    Left CCA dist sys 10/15/2019 88.8  cm/s Final    Left CCA dist mora 10/15/2019 17.6  cm/s Final    LEFT COMMON CAROTID ARTERY MID S 10/15/2019 109.18  cm/s Final    LEFT COMMON CAROTID ARTERY MID D 10/15/2019 21.97  cm/s Final    Left CCA prox sys 10/15/2019 150.0  cm/s Final    Left CCA prox mora 10/15/2019 26.9  cm/s Final    Left ICA dist sys 10/15/2019 50.6  cm/s Final    Left ICA dist mora 10/15/2019 21.8  cm/s Final    Left ICA mid sys 10/15/2019 46.5  cm/s Final    Left ICA mid mora 10/15/2019 21.2  cm/s Final    Left ICA prox sys 10/15/2019 67.8  cm/s Final    Left ICA prox mora 10/15/2019 17.2  cm/s Final    Left vertebral sys 10/15/2019 58.2  cm/s Final    LEFT VERTEBRAL ARTERY D 10/15/2019 19.19  cm/s Final    Left ICA/CCA sys 10/15/2019 0.76   Final    Right eca sys 10/15/2019 75.4  cm/s Final    Right subclavian sys 10/15/2019 106.7  cm/s Final    Left ECA sys 10/15/2019 81.0  cm/s Final    Left subclavian sys 10/15/2019 121.8  cm/s Final   Hospital Outpatient Visit on 10/04/2019   Component Date Value Ref Range Status    Sodium 10/04/2019 136  136 - 145 mmol/L Final    Potassium 10/04/2019 4.6  3.5 - 5.5 mmol/L Final    Chloride 10/04/2019 103  100 - 111 mmol/L Final    CO2 10/04/2019 27  21 - 32 mmol/L Final    Anion gap 10/04/2019 6  3.0 - 18 mmol/L Final    Glucose 10/04/2019 202* 74 - 99 mg/dL Final    BUN 10/04/2019 14  7.0 - 18 MG/DL Final    Creatinine 10/04/2019 0.88  0.6 - 1.3 MG/DL Final    BUN/Creatinine ratio 10/04/2019 16  12 - 20   Final    GFR est AA 10/04/2019 >60  >60 ml/min/1.73m2 Final    GFR est non-AA 10/04/2019 >60  >60 ml/min/1.73m2 Final    Comment: (NOTE)  Estimated GFR is calculated using the Modification of Diet in Renal   Disease (MDRD) Study equation, reported for both  Americans   (GFRAA) and non- Americans (GFRNA), and normalized to 1.73m2   body surface area. The physician must decide which value applies to   the patient. The MDRD study equation should only be used in   individuals age 25 or older. It has not been validated for the   following: pregnant women, patients with serious comorbid conditions,   or on certain medications, or persons with extremes of body size,   muscle mass, or nutritional status.       Calcium 10/04/2019 10.0  8.5 - 10.1 MG/DL Final    Methylmalonic acid 10/04/2019 172  0 - 378 nmol/L Final    Disclaimer 10/04/2019 Comment    Final    Comment: (NOTE)  This test was developed and its performance characteristics  determined by [facility]. It has not been cleared or approved  by the Food and Drug Administration. Performed At: [address] [id number]  Vandana Miller MD [id number]      C-Reactive protein 10/04/2019 0.4* 0 - 0.3 mg/dL Final    Sed rate, automated 10/04/2019 2  0 - 15 mm/hr Final       .No results found for any visits on 10/18/19. Assessment / Plan      ICD-10-CM ICD-9-CM    1. Neck pain M54.2 723.1 XR SPINE CERV 4 OR 5 V       Xray of cervical spine  he was advised to continue his maintenance medications      Follow-up and Dispositions    · Return in about 3 months (around 1/18/2020). I asked Vidhya Godoy if he has any questions and I answered the questions. Vidhya Godoy states that he understands the treatment plan and agrees with the treatment plan          THIS DOCUMENT WAS CREATED WITH A VOICE ACTIVATED DICTATION SYSTEM.   IT MAY CONTAIN TRANSCRIPTION ERRORS

## 2019-11-24 DIAGNOSIS — E11.21 TYPE II DIABETES MELLITUS WITH NEPHROPATHY (HCC): ICD-10-CM

## 2019-11-24 RX ORDER — METFORMIN HYDROCHLORIDE 500 MG/1
TABLET ORAL
Qty: 180 TAB | Refills: 1 | Status: SHIPPED | OUTPATIENT
Start: 2019-11-24 | End: 2020-10-13 | Stop reason: DRUGHIGH

## 2019-12-11 DIAGNOSIS — I10 ESSENTIAL HYPERTENSION: ICD-10-CM

## 2019-12-12 RX ORDER — IBUPROFEN 800 MG/1
TABLET ORAL
Qty: 90 TAB | Refills: 0 | Status: SHIPPED | OUTPATIENT
Start: 2019-12-12 | End: 2020-02-24

## 2020-02-18 ENCOUNTER — OFFICE VISIT (OUTPATIENT)
Dept: CARDIOLOGY CLINIC | Age: 50
End: 2020-02-18

## 2020-02-18 VITALS
HEIGHT: 65 IN | BODY MASS INDEX: 37.15 KG/M2 | WEIGHT: 223 LBS | HEART RATE: 89 BPM | DIASTOLIC BLOOD PRESSURE: 80 MMHG | SYSTOLIC BLOOD PRESSURE: 144 MMHG | OXYGEN SATURATION: 98 %

## 2020-02-18 DIAGNOSIS — I10 ESSENTIAL HYPERTENSION: ICD-10-CM

## 2020-02-18 DIAGNOSIS — E11.40 TYPE 2 DIABETES MELLITUS WITH DIABETIC NEUROPATHY, WITHOUT LONG-TERM CURRENT USE OF INSULIN (HCC): ICD-10-CM

## 2020-02-18 DIAGNOSIS — E66.01 SEVERE OBESITY (BMI 35.0-39.9) WITH COMORBIDITY (HCC): ICD-10-CM

## 2020-02-18 DIAGNOSIS — E78.5 DYSLIPIDEMIA: ICD-10-CM

## 2020-02-18 DIAGNOSIS — R00.2 PALPITATIONS: ICD-10-CM

## 2020-02-18 DIAGNOSIS — I44.7 LBBB (LEFT BUNDLE BRANCH BLOCK): Primary | ICD-10-CM

## 2020-02-18 RX ORDER — ROSUVASTATIN CALCIUM 5 MG/1
5 TABLET, COATED ORAL
Qty: 30 TAB | Refills: 3 | Status: SHIPPED | OUTPATIENT
Start: 2020-02-18

## 2020-02-18 NOTE — PROGRESS NOTES
HISTORY OF PRESENT ILLNESS  Yahaira Jerome is a 51 y.o. male. Follow-up   Pertinent negatives include no chest pain and no shortness of breath. Hypertension   Pertinent negatives include no chest pain and no shortness of breath. Dizziness   Pertinent negatives include no chest pain and no shortness of breath. Palpitations    Associated symptoms include dizziness. Pertinent negatives include no fever, no malaise/fatigue, no chest pain, no claudication, no orthopnea, no PND, no nausea, no vomiting, no back pain, no cough and no shortness of breath. His past medical history is significant for hypertension. Patient presents for an overdue follow-up office visit. He has a past medical history significant for a known chronic left bundle branch block. He also has a history of questionable pulmonary hypertension based on echocardiogram in the past, so he underwent a cardiac catheterization back in 2012 which showed normal coronary anatomy, with upper normal PA pressures and no evidence of intracardiac shunting. Patient underwent follow-up noninvasive cardiac testing in January 2017. He underwent an echocardiogram which showed a low normal EF of 50-55% with mild concentric LVH and moderate pulmonary hypertension. His pharmacologic nuclear stress test was negative for ischemia. His echocardiogram was essentially unchanged compared to his previous study from 2012. Patient was last seen in the office 16 months ago. Since last visit, he was started on atorvastatin by his PCP, however he quickly discontinued this medication after developing flulike symptoms which resolved the day after stopping the medication. He complains of intermittent heart palpitations use about once a month only occurring at night and lasting for 5 minutes or less in duration. He does complain of associated diaphoresis but no zabrina syncope. He will get occasional dizzy spells when he stands up too quickly.   The symptoms quickly resolved in less than 30 seconds. He underwent a carotid duplex scan in October 2019 which did not show any significant disease. Past Medical History:   Diagnosis Date    Acute sinusitis     BBB (bundle branch block)     Bursitis disorder     Cardiac cath 07/30/2012    R dom. Patent coronary arteries. LVEDP 15.  RA 10.  RV 32/10. PA 32/18. Mean PASP 22. W 14.  CO 5.7 (TD). High CO & high O2 sats on right raise question of peripheral arterial venous shunting.  Cardiac echocardiogram 06/27/2012    EF 50-55%. Dyssynergic septal motion c/w LBBB. RVSP/PASP 50-55. Mild LAE. Mild-mod TR. Mild IVCE. 30 x 5-mm echodensity in interventricular groove (poss fibrinous deposit). Sm pericardial effus at apex.  Hx of cardiac cath     Braeden's Daughters at about 12years old.  Hypertension     Obesity     Other ill-defined conditions(799.89)     heart cath 2012    Skin lesion     Umbilical hernia without obstruction or gangrene     Unspecified sleep apnea     no diagnosed     Current Outpatient Medications   Medication Sig Dispense Refill    rosuvastatin (CRESTOR) 5 mg tablet Take 1 Tab by mouth nightly. 30 Tab 3    ibuprofen (MOTRIN) 800 mg tablet TAKE 1 TABLET BY MOUTH EVERY 8 HOURS AS NEEDED FOR PAIN 90 Tab 0    metFORMIN (GLUCOPHAGE) 500 mg tablet TAKE 1 TABLET BY MOUTH TWICE DAILY WITH MEALS 180 Tab 1    DULoxetine (CYMBALTA) 20 mg capsule 1 capsule daily 30 Cap 3    labetalol (NORMODYNE) 100 mg tablet TAKE 1 TABLET BY MOUTH TWICE DAILY (GENERIC  FOR  NORMODYNE) 180 Tab 1    telmisartan (MICARDIS) 40 mg tablet TAKE 1 TABLET BY MOUTH ONCE DAILY STOP  VALSTARTAN 90 Tab 3    traMADol (ULTRAM) 50 mg tablet Take 1 Tab by mouth every six (6) hours as needed for Pain. Max Daily Amount: 200 mg. 24 Tab 1    tiZANidine (ZANAFLEX) 2 mg tablet TAKE 1 TABLET BY MOUTH THREE TIMES DAILY AS NEEDED 90 Tab 0    aspirin delayed-release 81 mg tablet Take  by mouth daily.        Allergies   Allergen Reactions  Iodine Unknown (comments)    Lipitor [Atorvastatin] Nausea Only      Social History     Tobacco Use    Smoking status: Never Smoker    Smokeless tobacco: Never Used   Substance Use Topics    Alcohol use: Yes     Alcohol/week: 0.0 standard drinks     Types: 2 - 3 Standard drinks or equivalent per week     Comment: \"few drinks every evening\"    Drug use: No     Family History   Problem Relation Age of Onset    Diabetes Mother     Cancer Mother     Cancer Other         lung; grandfather-nos         Review of Systems   Constitutional: Negative for chills, fever, malaise/fatigue and weight loss. HENT: Negative for nosebleeds. Eyes: Negative for blurred vision and double vision. Respiratory: Negative for cough, shortness of breath and wheezing. Cardiovascular: Positive for palpitations. Negative for chest pain, orthopnea, claudication, leg swelling and PND. Gastrointestinal: Negative for heartburn, nausea and vomiting. Genitourinary: Negative for dysuria and hematuria. Musculoskeletal: Negative for back pain, falls, joint pain and myalgias. Skin: Negative for rash. Neurological: Positive for dizziness. Negative for focal weakness. Endo/Heme/Allergies: Does not bruise/bleed easily. Psychiatric/Behavioral: Negative for substance abuse. Visit Vitals  /80 (BP 1 Location: Left arm, BP Patient Position: Sitting)   Pulse 89   Ht 5' 5\" (1.651 m)   Wt 101.2 kg (223 lb)   SpO2 98%   BMI 37.11 kg/m²       Physical Exam   Constitutional: He is oriented to person, place, and time. He appears well-developed and well-nourished. HENT:   Head: Normocephalic and atraumatic. Eyes: Conjunctivae are normal.   Neck: Neck supple. No JVD present. Carotid bruit is not present. Cardiovascular: Normal rate, regular rhythm, S1 normal, S2 normal and normal pulses. Exam reveals no gallop and no S3. No murmur heard. Pulmonary/Chest: Breath sounds normal. He has no wheezes. He has no rales. Abdominal: Soft. Bowel sounds are normal. There is no abdominal tenderness. Musculoskeletal:         General: No tenderness, deformity or edema. Neurological: He is alert and oriented to person, place, and time. Skin: Skin is warm and dry. Psychiatric: He has a normal mood and affect. His behavior is normal. Thought content normal.     EKG:  Normal sinus rhythm, leftward axis, atypical left bundle branch block, no change compared to his previous EKG. ASSESSMENT and PLAN    Heart palpitations. Intermittent episodes only lasting less than 5 minutes at most in duration. My suspicion is that the patient was either experiencing atrial or ventricular ectopy or possibly a short run of SVT. He is already taking a beta-blocker which I would continue. If his symptoms become more frequent, I would consider a 30-day event monitor. Essential hypertension. Patient blood pressure appears to be well-controlled on his current medical regimen. Chronic left bundle branch block. Unchanged on EKG. patient underwent a cardiac catheterization in 2012 which did not show any significant coronary disease. He then underwent a pharmacologic nuclear stress test in January 2017 which did not show any ischemia. Diabetes mellitus, type II. This is been managed with oral agents. This followed closely by his PCP. Dyslipidemia. Patient was started on atorvastatin by his PCP last year, but he cannot tolerate this due to flulike symptoms. His last lipid panel from July 2019: Total cholesterol 230, triglycerides 250, HDL 38, . I have recommended a trial of another type of statin we will see if he will tolerate Crestor 5 mg daily better. If he is able to tolerate this medication, I would like to check a fasting lipid panel in several months. Follow-up annually, sooner if needed.

## 2020-02-18 NOTE — LETTER
NOTIFICATION RETURN TO WORK / SCHOOL 
 
2/18/2020 2:04 PM 
 
Mr. Yara Ness [address] [id number] To Whom It May Concern: Yara Ness is currently under the care of [address] [place] [address]. He will return to work on: 02/19/20. If there are questions or concerns please have the patient contact our office. Sincerely, Isela Rocha MD

## 2020-02-24 DIAGNOSIS — I10 ESSENTIAL HYPERTENSION: ICD-10-CM

## 2020-02-24 RX ORDER — IBUPROFEN 800 MG/1
TABLET ORAL
Qty: 90 TAB | Refills: 0 | Status: SHIPPED | OUTPATIENT
Start: 2020-02-24 | End: 2020-04-16

## 2020-03-05 ENCOUNTER — OFFICE VISIT (OUTPATIENT)
Dept: FAMILY MEDICINE CLINIC | Facility: CLINIC | Age: 50
End: 2020-03-05

## 2020-03-05 VITALS
DIASTOLIC BLOOD PRESSURE: 84 MMHG | HEIGHT: 65 IN | RESPIRATION RATE: 12 BRPM | BODY MASS INDEX: 36.15 KG/M2 | SYSTOLIC BLOOD PRESSURE: 138 MMHG | HEART RATE: 86 BPM | OXYGEN SATURATION: 97 % | TEMPERATURE: 97.5 F | WEIGHT: 217 LBS

## 2020-03-05 DIAGNOSIS — K62.89 RECTAL PAIN: Primary | ICD-10-CM

## 2020-03-05 DIAGNOSIS — M62.838 MUSCLE SPASM: ICD-10-CM

## 2020-03-05 DIAGNOSIS — I10 ESSENTIAL HYPERTENSION: ICD-10-CM

## 2020-03-05 DIAGNOSIS — K62.5 RECTAL BLEEDING: ICD-10-CM

## 2020-03-05 RX ORDER — TIZANIDINE 2 MG/1
TABLET ORAL
Qty: 90 TAB | Refills: 1 | Status: SHIPPED | OUTPATIENT
Start: 2020-03-05 | End: 2020-10-01 | Stop reason: SDUPTHER

## 2020-03-05 NOTE — LETTER
NOTIFICATION RETURN TO WORK / SCHOOL 
 
3/5/2020 2:11 PM 
 
Mr. Martina Hernandez [address] [id number] To Whom It May Concern: Martina Hernandez is currently under the care of [facility] Moran. He will return to work/school on: 3/6/2020 If there are questions or concerns please have the patient contact our office.  
 
 
 
Sincerely, 
 
 
 
 
Kathrin Collazo NP

## 2020-03-05 NOTE — PROGRESS NOTES
Domonique Goss is a 51 y.o. male presenting today for Groin Pain (Lump, bleeding, and discharge in groin area. Patient stated he noticed it 3 months ago.)  . HPI:  Domonique Goss presents to the office today for rectal pain, electrical rectal sting, and a dull deep ache in the rectal area x2 months. He also notes he has pressure and discharge. He notes weeks ago when he coughed he noticed a clear, pink discharge. On yesterday hn he wiped about 40% of the cloth was bloody. His last colonoscopy was was in 2017. He notes during his last colonoscopy he had 7 polyps. He also has a history of hypertension-he is compliant with taking his medication daily. He is also complaining of electric sensation in his left jaw that radiates to the left side of his head. He notes eating something cold or hot triggers the sensation. He also notes a left side molar tooth can trigger the sensation when he bites down on something hard. Review of Systems   Constitutional: Negative for malaise/fatigue. Respiratory: Negative for cough, sputum production and shortness of breath. Cardiovascular: Negative for chest pain and palpitations. Gastrointestinal: Negative for abdominal pain, nausea and vomiting. Genitourinary:        Rectal pain and bleeding   Neurological: Negative for dizziness and headaches. Allergies   Allergen Reactions    Iodine Unknown (comments)    Lipitor [Atorvastatin] Nausea Only       Current Outpatient Medications   Medication Sig Dispense Refill    tiZANidine (ZANAFLEX) 2 mg tablet TAKE 1 TABLET BY MOUTH THREE TIMES DAILY AS NEEDED 90 Tab 1    ibuprofen (MOTRIN) 800 mg tablet TAKE 1 TABLET BY MOUTH EVERY 8 HOURS AS NEEDED FOR PAIN 90 Tab 0    rosuvastatin (CRESTOR) 5 mg tablet Take 1 Tab by mouth nightly.  30 Tab 3    metFORMIN (GLUCOPHAGE) 500 mg tablet TAKE 1 TABLET BY MOUTH TWICE DAILY WITH MEALS 180 Tab 1    labetalol (NORMODYNE) 100 mg tablet TAKE 1 TABLET BY MOUTH TWICE DAILY (GENERIC  FOR  NORMODYNE) 180 Tab 1    telmisartan (MICARDIS) 40 mg tablet TAKE 1 TABLET BY MOUTH ONCE DAILY STOP  VALSTARTAN 90 Tab 3    aspirin delayed-release 81 mg tablet Take  by mouth daily.  RX AMB NIFEDIPINE 0.2 % RECTAL OINTMENT COMPOUND by PeriANAL route three (3) times daily for 45 days. In 30 gm petrolatum 1 Tube 2    DULoxetine (CYMBALTA) 20 mg capsule 1 capsule daily 30 Cap 3    traMADol (ULTRAM) 50 mg tablet Take 1 Tab by mouth every six (6) hours as needed for Pain. Max Daily Amount: 200 mg. 24 Tab 1       Past Medical History:   Diagnosis Date    Acute sinusitis     Back pain     BBB (bundle branch block)     Bursitis disorder     Cardiac cath 07/30/2012    R dom. Patent coronary arteries. LVEDP 15.  RA 10.  RV 32/10. PA 32/18. Mean PASP 22. W 14.  CO 5.7 (TD). High CO & high O2 sats on right raise question of peripheral arterial venous shunting.  Cardiac echocardiogram 06/27/2012    EF 50-55%. Dyssynergic septal motion c/w LBBB. RVSP/PASP 50-55. Mild LAE. Mild-mod TR. Mild IVCE. 30 x 5-mm echodensity in interventricular groove (poss fibrinous deposit). Sm pericardial effus at apex.  Diabetes ([address]     Hx of cardiac cath     Braeden's Daughters at about 12years old.     Hypertension     Neuropathy     bilateral in feet    Obesity     Other ill-defined conditions(799.89)     heart cath 2012    Skin lesion     Umbilical hernia without obstruction or gangrene     Unspecified sleep apnea     no diagnosed       Past Surgical History:   Procedure Laterality Date    HX COLONOSCOPY  09/2017    hx of polyps    HX HEART CATHETERIZATION  07/30/12    HX HEENT      dental extract    HX HERNIA REPAIR  2014    HX LUMBAR LAMINECTOMY  02/08/2018    L4 laminectomy, L5 laminectomy       Social History     Socioeconomic History    Marital status:      Spouse name: Not on file    Number of children: Not on file    Years of education: Not on file    Highest education level: Not on file   Occupational History    Not on file   Social Needs    Financial resource strain: Not on file    Food insecurity     Worry: Not on file     Inability: Not on file    Transportation needs     Medical: Not on file     Non-medical: Not on file   Tobacco Use    Smoking status: Never Smoker    Smokeless tobacco: Never Used   Substance and Sexual Activity    Alcohol use: Yes     Alcohol/week: 0.0 standard drinks     Types: 2 - 3 Standard drinks or equivalent per week     Comment: \"few drinks every evening\"    Drug use: No    Sexual activity: Yes     Partners: Female   Lifestyle    Physical activity     Days per week: Not on file     Minutes per session: Not on file    Stress: Not on file   Relationships    Social connections     Talks on phone: Not on file     Gets together: Not on file     Attends Mu-ism service: Not on file     Active member of club or organization: Not on file     Attends meetings of clubs or organizations: Not on file     Relationship status: Not on file    Intimate partner violence     Fear of current or ex partner: Not on file     Emotionally abused: Not on file     Physically abused: Not on file     Forced sexual activity: Not on file   Other Topics Concern    Not on file   Social History Narrative    Not on file       Patient does not have an advanced directive on file    Vitals:    03/05/20 1328 03/05/20 1416   BP: 144/72 138/84   Pulse: 86    Resp: 12    Temp: 97.5 °F (36.4 °C)    TempSrc: Oral    SpO2: 97%    Weight: 217 lb (98.4 kg)    Height: 5' 5\" (1.651 m)    PainSc:   0 - No pain        Physical Exam  Vitals signs and nursing note reviewed. Cardiovascular:      Rate and Rhythm: Normal rate and regular rhythm. Pulses: Normal pulses. Heart sounds: Normal heart sounds. Pulmonary:      Effort: Pulmonary effort is normal.      Breath sounds: Normal breath sounds.    Genitourinary:     Comments: No external mass          No visits with results within 3 Month(s) from this visit.    Latest known visit with results is:   Hospital Outpatient Visit on 10/15/2019   Component Date Value Ref Range Status    Right cca dist sys 10/15/2019 115.7  cm/s Final    Right CCA dist mora 10/15/2019 15.5  cm/s Final    RIGHT COMMON CAROTID ARTERY MID S 10/15/2019 138.43  cm/s Final    RIGHT COMMON CAROTID ARTERY MID D 10/15/2019 24.62  cm/s Final    Right CCA prox sys 10/15/2019 171.0  cm/s Final    Right CCA prox mora 10/15/2019 22.3  cm/s Final    Right ICA dist sys 10/15/2019 82.4  cm/s Final    Right ICA dist mora 10/15/2019 28.6  cm/s Final    Right ICA mid sys 10/15/2019 73.2  cm/s Final    Right ICA mid mora 10/15/2019 21.5  cm/s Final    Right ICA prox sys 10/15/2019 82.0  cm/s Final    Right ICA prox mora 10/15/2019 20.4  cm/s Final    Right vertebral sys 10/15/2019 41.0  cm/s Final    RIGHT VERTEBRAL ARTERY D 10/15/2019 15.37  cm/s Final    Right ICA/CCA sys 10/15/2019 0.7   Final    Left CCA dist sys 10/15/2019 88.8  cm/s Final    Left CCA dist mora 10/15/2019 17.6  cm/s Final    LEFT COMMON CAROTID ARTERY MID S 10/15/2019 109.18  cm/s Final    LEFT COMMON CAROTID ARTERY MID D 10/15/2019 21.97  cm/s Final    Left CCA prox sys 10/15/2019 150.0  cm/s Final    Left CCA prox mora 10/15/2019 26.9  cm/s Final    Left ICA dist sys 10/15/2019 50.6  cm/s Final    Left ICA dist mora 10/15/2019 21.8  cm/s Final    Left ICA mid sys 10/15/2019 46.5  cm/s Final    Left ICA mid mora 10/15/2019 21.2  cm/s Final    Left ICA prox sys 10/15/2019 67.8  cm/s Final    Left ICA prox mora 10/15/2019 17.2  cm/s Final    Left vertebral sys 10/15/2019 58.2  cm/s Final    LEFT VERTEBRAL ARTERY D 10/15/2019 19.19  cm/s Final    Left ICA/CCA sys 10/15/2019 0.76   Final    Right eca sys 10/15/2019 75.4  cm/s Final    Right subclavian sys 10/15/2019 106.7  cm/s Final    Left ECA sys 10/15/2019 81.0  cm/s Final    Left subclavian sys 10/15/2019 121.8  cm/s Final .No results found for any visits on 03/05/20. Assessment / Plan:      ICD-10-CM ICD-9-CM    1. Rectal pain K62.89 569.42 REFERRAL TO COLON AND RECTAL SURGERY   2. Rectal bleeding K62.5 569.3 REFERRAL TO COLON AND RECTAL SURGERY   3. Essential hypertension I10 401.9    4. Muscle spasm M62.838 728.85 tiZANidine (ZANAFLEX) 2 mg tablet       Referral to colorectal surgeon  Zanaflex refilled      Follow-up and Dispositions    · Return in about 6 months (around 9/5/2020). I asked the patient if he  had any questions and answered his  questions. The patient stated that he understands the treatment plan and agrees with the treatment plan    This document was created with a voice activated dictation system and may contain transcription errors.

## 2020-03-05 NOTE — PROGRESS NOTES
Visit Vitals  /72   Pulse 86   Temp 97.5 °F (36.4 °C) (Oral)   Resp 12   Ht 5' 5\" (1.651 m)   Wt 217 lb (98.4 kg)   SpO2 97%   BMI 36.11 kg/m²     Parminder Costello presents today for   Chief Complaint   Patient presents with    Groin Pain     Lump, bleeding, and discharge in groin area. Patient stated he noticed it 3 months ago. Is someone accompanying this pt? no    Is the patient using any DME equipment during [address]? no    Depression Screening:  3 most recent PHQ Screens 3/5/2020   Little interest or pleasure in doing things Not at all   Feeling down, depressed, irritable, or hopeless Not at all   Total Score PHQ 2 0       Learning Assessment:  Learning Assessment 2/18/2020   PRIMARY LEARNER Patient   HIGHEST LEVEL OF EDUCATION - PRIMARY LEARNER  -   BARRIERS PRIMARY LEARNER -   [address] [place] [address]    NEED -   LEARNER PREFERENCE PRIMARY DEMONSTRATION   ANSWERED BY Patient   RELATIONSHIP SELF       Abuse Screening:  Abuse Screening Questionnaire 2/18/2020   Do you ever feel afraid of your partner? N   Are you in a relationship with someone who physically or mentally threatens you? N   Is it safe for you to go home? Y       Fall Risk  Fall Risk Assessment, last 12 mths 2/18/2020   Able to walk? Yes   Fall in past 12 months? No       Health Maintenance reviewed and discussed and ordered per Provider. Health Maintenance Due   Topic Date Due    Pneumococcal 0-64 years (1 of 1 - PPSV23) 11/09/1977    Eye Exam Retinal or Dilated  11/09/1981    DTaP/Tdap/Td series (1 - Tdap) 11/09/1982    Influenza Age 5 to Adult  08/01/2019           Coordination of Care:  1. Have you been to the ER, urgent care clinic since your last visit? Hospitalized since your last visit? no    2. Have you seen or consulted any other health care providers outside of the [address] [place] [address] since your last visit? Include any pap smears or colon screening.  no

## 2020-03-12 ENCOUNTER — OFFICE VISIT (OUTPATIENT)
Dept: SURGERY | Age: 50
End: 2020-03-12

## 2020-03-12 VITALS
TEMPERATURE: 98.1 F | DIASTOLIC BLOOD PRESSURE: 68 MMHG | WEIGHT: 219 LBS | OXYGEN SATURATION: 98 % | HEART RATE: 85 BPM | HEIGHT: 65 IN | BODY MASS INDEX: 36.49 KG/M2 | RESPIRATION RATE: 18 BRPM | SYSTOLIC BLOOD PRESSURE: 135 MMHG

## 2020-03-12 DIAGNOSIS — K60.2 ANAL FISSURE: Primary | ICD-10-CM

## 2020-03-12 NOTE — LETTER
3/12/20 Patient: Eren Morales YOB: 1970 Date of Visit: 3/12/2020 Nadine Gomes NP 
[address] [place] [address] [address] [facility] [address] VIA In Basket Dear Lias Whitmore, I saw Humphrey Paget in the office for anorectal pain. On exam he has a posterior midline anal fissure. I will start him on nifedipine ointment and he will follow-up in the office in 3 weeks time. I have also asked him to follow-up with his gastroenterologist for a colonoscopy this summer. If you have questions, please do not hesitate to call me. I look forward to following your patient along with you. Sincerely, Zak Hood MD

## 2020-03-12 NOTE — PROGRESS NOTES
HPI: Manish Palacio is a 51 y.o. male presenting with chief complain of rectal pain. This is been going on for at least 2 months. He has noticed a lump in the perianal area. There is stinging, itching and an achy discomfort. This happens sporadically. He moves his bowels 1-2 times per day, it is variable in consistency. He is not on a bowel regimen. He sees blood mostly on the toilet paper. He denies incontinence though he has some urgency. His last colonoscopy was in 2017 where polyps were identified. He is due this year. Past Medical History:   Diagnosis Date    Acute sinusitis     Back pain     BBB (bundle branch block)     Bursitis disorder     Cardiac cath 07/30/2012    R dom. Patent coronary arteries. LVEDP 15.  RA 10.  RV 32/10. PA 32/18. Mean PASP 22. W 14.  CO 5.7 (TD). High CO & high O2 sats on right raise question of peripheral arterial venous shunting.  Cardiac echocardiogram 06/27/2012    EF 50-55%. Dyssynergic septal motion c/w LBBB. RVSP/PASP 50-55. Mild LAE. Mild-mod TR. Mild IVCE. 30 x 5-mm echodensity in interventricular groove (poss fibrinous deposit). Sm pericardial effus at apex.  Diabetes ([address]     Hx of cardiac cath     Braeden's Daughters at about 12years old.     Hypertension     Neuropathy     bilateral in feet    Obesity     Other ill-defined conditions(799.89)     heart cath 2012    Skin lesion     Umbilical hernia without obstruction or gangrene     Unspecified sleep apnea     no diagnosed       Past Surgical History:   Procedure Laterality Date    HX COLONOSCOPY  09/2017    hx of polyps    HX HEART CATHETERIZATION  07/30/12    HX HEENT      dental extract    HX HERNIA REPAIR  2014    HX LUMBAR LAMINECTOMY  02/08/2018    L4 laminectomy, L5 laminectomy       Family History   Problem Relation Age of Onset    Diabetes Mother     Cancer Mother     Cancer Other         lung; grandfather-nos       Social History     Socioeconomic History    Marital status:      Spouse name: Not on file    Number of children: Not on file    Years of education: Not on file    Highest education level: Not on file   Tobacco Use    Smoking status: Never Smoker    Smokeless tobacco: Never Used   Substance and Sexual Activity    Alcohol use: Yes     Alcohol/week: 0.0 standard drinks     Types: 2 - 3 Standard drinks or equivalent per week     Comment: \"few drinks every evening\"    Drug use: No    Sexual activity: Yes     Partners: Female       Review of Systems - Review of Systems   Constitutional: Positive for diaphoresis. Negative for chills, fever, malaise/fatigue and weight loss. Night sweats   HENT: Positive for tinnitus. Negative for congestion, ear discharge, ear pain, hearing loss, nosebleeds, sinus pain and sore throat. Eyes: Positive for blurred vision. Negative for double vision, photophobia, pain, discharge and redness. Respiratory: Positive for cough and shortness of breath. Negative for hemoptysis, sputum production, wheezing and stridor. Cardiovascular: Positive for palpitations. Negative for chest pain, orthopnea, claudication, leg swelling and PND. At nightime   Gastrointestinal: Positive for abdominal pain. Negative for blood in stool, constipation, diarrhea, heartburn, melena, nausea and vomiting. Thinner stools recently   Genitourinary: Negative. Musculoskeletal: Positive for back pain, joint pain and neck pain. Negative for falls and myalgias. Skin: Positive for itching. Negative for rash. rectal   Neurological: Positive for tingling and headaches. Negative for dizziness, tremors, sensory change, speech change, focal weakness, seizures, loss of consciousness and weakness. Endo/Heme/Allergies: Negative. Psychiatric/Behavioral: Negative.           Outpatient Medications Marked as Taking for the 3/12/20 encounter (Office Visit) with Estephania Greenberg MD   Medication Sig Dispense Refill    tiZANidine (ZANAFLEX) 2 mg tablet TAKE 1 TABLET BY MOUTH THREE TIMES DAILY AS NEEDED 90 Tab 1    ibuprofen (MOTRIN) 800 mg tablet TAKE 1 TABLET BY MOUTH EVERY 8 HOURS AS NEEDED FOR PAIN 90 Tab 0    metFORMIN (GLUCOPHAGE) 500 mg tablet TAKE 1 TABLET BY MOUTH TWICE DAILY WITH MEALS 180 Tab 1    labetalol (NORMODYNE) 100 mg tablet TAKE 1 TABLET BY MOUTH TWICE DAILY (GENERIC  FOR  NORMODYNE) 180 Tab 1    telmisartan (MICARDIS) 40 mg tablet TAKE 1 TABLET BY MOUTH ONCE DAILY STOP  VALSTARTAN 90 Tab 3    aspirin delayed-release 81 mg tablet Take  by mouth daily. Allergies   Allergen Reactions    Iodine Unknown (comments)    Lipitor [Atorvastatin] Nausea Only       Vitals:    03/12/20 1319   Resp: 18   Weight: 99.3 kg (219 lb)   Height: 5' 5\" (1.651 m)   PainSc:   0 - No pain       Physical Exam  Constitutional:       Appearance: He is well-developed. HENT:      Head: Normocephalic and atraumatic. Eyes:      Conjunctiva/sclera: Conjunctivae normal.   Abdominal:      General: There is no distension. Palpations: Abdomen is soft. There is no mass. Tenderness: There is no abdominal tenderness. Musculoskeletal: Normal range of motion. Lymphadenopathy:      Cervical: No cervical adenopathy. Skin:     General: Skin is warm and dry. Neurological:      Sensory: No sensory deficit. Psychiatric:         Speech: Speech normal.     Rectum: Posterior midline anal fissure, hypertonic sphincter    Assessment / Plan    Anal fissure  Nifedipine 3 times daily, fiber powder or stool softener  Follow-up in 3 weeks  Patient asked to follow-up with his gastroenterologist for colonoscopy this summer    The diagnoses and plan were discussed with the patient. All questions answered. Plan of care agreed to by all concerned.

## 2020-03-12 NOTE — PATIENT INSTRUCTIONS
Don't overclean with Bidet    Nfedipine three times a day    Stool softner or Metamucil/Citrucel once a day    Come back to office in 3 weeks

## 2020-04-03 RX ORDER — LABETALOL 100 MG/1
TABLET, FILM COATED ORAL
Qty: 180 TAB | Refills: 1 | Status: SHIPPED | OUTPATIENT
Start: 2020-04-03 | End: 2020-10-13 | Stop reason: SDUPTHER

## 2020-04-16 ENCOUNTER — VIRTUAL VISIT (OUTPATIENT)
Dept: SURGERY | Age: 50
End: 2020-04-16

## 2020-04-16 VITALS — WEIGHT: 219 LBS | BODY MASS INDEX: 36.49 KG/M2 | RESPIRATION RATE: 16 BRPM | HEIGHT: 65 IN

## 2020-04-16 DIAGNOSIS — I10 ESSENTIAL HYPERTENSION: ICD-10-CM

## 2020-04-16 DIAGNOSIS — K60.2 ANAL FISSURE: Primary | ICD-10-CM

## 2020-04-16 RX ORDER — IBUPROFEN 800 MG/1
TABLET ORAL
Qty: 90 TAB | Refills: 0 | Status: SHIPPED | OUTPATIENT
Start: 2020-04-16 | End: 2020-06-16

## 2020-06-01 ENCOUNTER — OFFICE VISIT (OUTPATIENT)
Dept: SURGERY | Age: 50
End: 2020-06-01

## 2020-06-01 VITALS
HEART RATE: 72 BPM | OXYGEN SATURATION: 99 % | HEIGHT: 65 IN | RESPIRATION RATE: 18 BRPM | TEMPERATURE: 97 F | WEIGHT: 214 LBS | SYSTOLIC BLOOD PRESSURE: 131 MMHG | DIASTOLIC BLOOD PRESSURE: 70 MMHG | BODY MASS INDEX: 35.65 KG/M2

## 2020-06-01 DIAGNOSIS — K60.2 ANAL FISSURE: Primary | ICD-10-CM

## 2020-06-01 NOTE — PROGRESS NOTES
Subjective: Occasional twinges of pain and bleeding but substantially better. Past medical history and ROS were reviewed and unchanged. Rectum: Posterior midline fissure, difficult to see, healing in    Assessment / Plan    Anal fissure, improved  Continue nifedipine, Metamucil  Follow-up 3 weeks  To schedule colonoscopy with GI later this summer    A total of 15 minutes was spent with the patient, with >50% of time spent on counseling and coordination of care. The diagnoses and plan were discussed with patient. All questions answered. Plan of care agreed to by all concerned.

## 2020-06-01 NOTE — LETTER
NOTIFICATION RETURN TO WORK / SCHOOL 
 
6/1/2020 11:49 AM 
 
Mr. Lalo Gómez [address] [phone] To Whom It May Concern: Lalo Gómez is currently under the care of Julioc[address] Dr. He will return to work/school on: 6/2/2020 If there are questions or concerns please have the patient contact our office. Sincerely, Que Bañuelos MD

## 2020-06-15 DIAGNOSIS — I10 ESSENTIAL HYPERTENSION: ICD-10-CM

## 2020-06-16 RX ORDER — IBUPROFEN 800 MG/1
TABLET ORAL
Qty: 90 TAB | Refills: 0 | Status: SHIPPED | OUTPATIENT
Start: 2020-06-16 | End: 2020-10-01 | Stop reason: SDUPTHER

## 2020-06-22 ENCOUNTER — OFFICE VISIT (OUTPATIENT)
Dept: SURGERY | Age: 50
End: 2020-06-22

## 2020-06-22 VITALS
SYSTOLIC BLOOD PRESSURE: 125 MMHG | TEMPERATURE: 97.3 F | HEART RATE: 79 BPM | WEIGHT: 212 LBS | OXYGEN SATURATION: 98 % | DIASTOLIC BLOOD PRESSURE: 69 MMHG | BODY MASS INDEX: 35.32 KG/M2 | HEIGHT: 65 IN | RESPIRATION RATE: 16 BRPM

## 2020-06-22 DIAGNOSIS — K60.2 ANAL FISSURE: Primary | ICD-10-CM

## 2020-06-22 NOTE — PROGRESS NOTES
Subjective: Pain resolved. Taking Metamucil and using nifedipine. Past medical history and ROS were reviewed and unchanged. Rectum: No visible external fissure  Digital rectal exam: Moderate tone, no mass, does not appear to be hypertonic    Assessment / Plan    Anal fissure, resolved  Continue nifedipine for 3 weeks then stop  Continue Metamucil, avoid hard stools  Okay to have colonoscopy after the next couple of months  Follow-up here PRN    A total of 15 minutes was spent with the patient, with >50% of time spent on counseling and coordination of care. The diagnoses and plan were discussed with patient. All questions answered. Plan of care agreed to by all concerned.

## 2020-06-22 NOTE — LETTER
NOTIFICATION RETURN TO WORK / SCHOOL 
 
6/22/2020 1:13 PM 
 
Mr. Crystal Cardoza [address] [place] [id number] To Whom It May Concern: Crystal Cardoza was seen today at [address]. He will return to work on 6/23/20. If there are questions or concerns please have the patient contact our office. Sincerely, Jocelyn George MD

## 2020-07-22 ENCOUNTER — OFFICE VISIT (OUTPATIENT)
Dept: FAMILY MEDICINE CLINIC | Age: 50
End: 2020-07-22

## 2020-07-22 VITALS
WEIGHT: 211 LBS | HEIGHT: 65 IN | TEMPERATURE: 98 F | BODY MASS INDEX: 35.16 KG/M2 | DIASTOLIC BLOOD PRESSURE: 68 MMHG | RESPIRATION RATE: 18 BRPM | HEART RATE: 92 BPM | OXYGEN SATURATION: 99 % | SYSTOLIC BLOOD PRESSURE: 138 MMHG

## 2020-07-22 DIAGNOSIS — Z00.8: ICD-10-CM

## 2020-07-22 DIAGNOSIS — Z00.8 ENCOUNTER FOR RECTAL EXAM: ICD-10-CM

## 2020-07-22 DIAGNOSIS — I10 ESSENTIAL HYPERTENSION: Primary | ICD-10-CM

## 2020-07-22 NOTE — LETTER
NOTIFICATION RETURN TO WORK / SCHOOL 
 
7/22/2020 8:55 AM 
 
Mr. Jeramie Phillip [address] [address] To Whom It May Concern: Jeramie Phillip is currently under the care of [address]. He will return to work/school on: 07/23/2020 If there are questions or concerns please have the patient contact our office.  
 
 
 
Sincerely, 
 
 
David Grijalva NP

## 2020-07-22 NOTE — PROGRESS NOTES
Visit Vitals  /68   Pulse 92   Temp 98 °F (36.7 °C) (Oral)   Resp 18   Ht 5' 5\" (1.651 m)   Wt 211 lb (95.7 kg)   SpO2 99%   BMI 35.11 kg/m²     Mary Roman presents today for   Chief Complaint   Patient presents with    Physical     physical for work       Is someone accompanying this pt? no    Is the patient using any DME equipment during OV? no    Depression Screening:  3 most recent PHQ Screens 7/22/2020   PHQ Not Done Medical Reason (indicate in comments)   Little interest or pleasure in doing things Not at all   Feeling down, depressed, irritable, or hopeless Not at all   Total Score PHQ 2 0       Learning Assessment:  Learning Assessment 2/18/2020   PRIMARY LEARNER Patient   HIGHEST LEVEL OF EDUCATION - PRIMARY LEARNER  -   BARRIERS PRIMARY LEARNER -   [address] [place] [address]    NEED -   LEARNER PREFERENCE PRIMARY DEMONSTRATION   ANSWERED BY Patient   RELATIONSHIP SELF       Abuse Screening:  Abuse Screening Questionnaire 2/18/2020   Do you ever feel afraid of your partner? N   Are you in a relationship with someone who physically or mentally threatens you? N   Is it safe for you to go home? Y       Fall Risk  Fall Risk Assessment, last 12 mths 2/18/2020   Able to walk? Yes   Fall in past 12 months? No       Health Maintenance reviewed and discussed and ordered per Provider. Health Maintenance Due   Topic Date Due    Eye Exam Retinal or Dilated  11/09/1981    DTaP/Tdap/Td series (1 - Tdap) 11/09/1992    A1C test (Diabetic or Prediabetic)  07/17/2020    MICROALBUMIN Q1  07/17/2020    Lipid Screen  07/17/2020           Coordination of Care:  1. Have you been to the ER, urgent care clinic since your last visit? Hospitalized since your last visit? no    2. Have you seen or consulted any other health care providers outside of the [address] [place] [address] since your last visit? Include any pap smears or colon screening.  no

## 2020-07-22 NOTE — PROGRESS NOTES
Donald Arora is a 51 y.o. male presenting today for Physical (physical for work)    HPI:  Donald Arora presents to the office today for digit rectal exam and testicular exam.  Notes he had a physical completed at work but he was not able to get the testicular exam or [place] and was instructed to present to his PCP for evaluation. He presents noting he feels well. He is negative for chest pain or palpitation. ROS    Constitutional: No apparent distress noted  General- negative for fever, chills or fatigue  Eyes- negative visual changes  CV- denies chest pain, palpitation  Pul: negative cough or SOB  GI: negative nausea, flank pain, diarrhea, constipation  Urinary:- No dysuria or polyuria  MS- negative myalgia, negative joint pain  Neuro- negative headache, dizziness or weakness  Skin- negative for rashes or lesions. Psych- denies any anxiety or depression    Allergies   Allergen Reactions    Iodine Unknown (comments)    Lipitor [Atorvastatin] Nausea Only       Current Outpatient Medications   Medication Sig Dispense Refill    ibuprofen (MOTRIN) 800 mg tablet TAKE 1 TABLET BY MOUTH EVERY 8 HOURS AS NEEDED FOR PAIN 90 Tab 0    labetaloL (NORMODYNE) 100 mg tablet TAKE 1 TABLET BY MOUTH TWICE DAILY (GENERIC  FOR  NORMODYNE) 180 Tab 1    tiZANidine (ZANAFLEX) 2 mg tablet TAKE 1 TABLET BY MOUTH THREE TIMES DAILY AS NEEDED 90 Tab 1    metFORMIN (GLUCOPHAGE) 500 mg tablet TAKE 1 TABLET BY MOUTH TWICE DAILY WITH MEALS 180 Tab 1    telmisartan (MICARDIS) 40 mg tablet TAKE 1 TABLET BY MOUTH ONCE DAILY STOP  VALSTARTAN 90 Tab 3    aspirin delayed-release 81 mg tablet Take  by mouth daily.  rosuvastatin (CRESTOR) 5 mg tablet Take 1 Tab by mouth nightly. 30 Tab 3    DULoxetine (CYMBALTA) 20 mg capsule 1 capsule daily 30 Cap 3    traMADol (ULTRAM) 50 mg tablet Take 1 Tab by mouth every six (6) hours as needed for Pain.  Max Daily Amount: 200 mg. 24 Tab 1       Past Medical History:   Diagnosis Date    Acute sinusitis     Back pain     BBB (bundle branch block)     Bursitis disorder     Cardiac cath 07/30/2012    R dom. Patent coronary arteries. LVEDP 15.  RA 10.  RV 32/10. PA 32/18. Mean PASP 22. W 14.  CO 5.7 (TD). High CO & high O2 sats on right raise question of peripheral arterial venous shunting.  Cardiac echocardiogram 06/27/2012    EF 50-55%. Dyssynergic septal motion c/w LBBB. RVSP/PASP 50-55. Mild LAE. Mild-mod TR. Mild IVCE. 30 x 5-mm echodensity in interventricular groove (poss fibrinous deposit). Sm pericardial effus at apex.  Diabetes ([facility] [address]     Hx of cardiac cath     Braeden's Daughters at about 12years old.  Hypertension     Neuropathy     bilateral in feet    Obesity     Other ill-defined conditions(799.89)     heart cath 2012    Skin lesion     Umbilical hernia without obstruction or gangrene     Unspecified sleep apnea     no diagnosed       Past Surgical History:   Procedure Laterality Date    HX COLONOSCOPY  09/2017    hx of polyps    HX HEART CATHETERIZATION  07/30/12    HX HEENT      dental extract    HX HERNIA REPAIR  2014    HX LUMBAR LAMINECTOMY  02/08/2018    L4 laminectomy, L5 laminectomy       Social History     Socioeconomic History    Marital status:      Spouse name: Not on file    Number of children: Not on file    Years of education: Not on file    Highest education level: Not on file   Occupational History    Not on file   Social Needs    Financial resource strain: Not on file    Food insecurity     Worry: Not on file     Inability: Not on file    Transportation needs     Medical: Not on file     Non-medical: Not on file   Tobacco Use    Smoking status: Never Smoker    Smokeless tobacco: Never Used   Substance and Sexual Activity    Alcohol use:  Yes     Alcohol/week: 0.0 standard drinks     Types: 2 - 3 Standard drinks or equivalent per week     Comment: \"few drinks every evening\"    Drug use: No    Sexual activity: Yes Partners: Female   Lifestyle    Physical activity     Days per week: Not on file     Minutes per session: Not on file    Stress: Not on file   Relationships    Social connections     Talks on phone: Not on file     Gets together: Not on file     Attends [employer] service: Not on file     Active member of club or organization: Not on file     Attends meetings of clubs or organizations: Not on file     Relationship status: Not on file    Intimate partner violence     Fear of current or ex partner: Not on file     Emotionally abused: Not on file     Physically abused: Not on file     Forced sexual activity: Not on file   Other Topics Concern    Not on file   Social History Narrative    Not on file       Patient does not have an advanced directive on file    Vitals:    07/22/20 0821   BP: 138/68   Pulse: 92   Resp: 18   Temp: 98 °F (36.7 °C)   TempSrc: Oral   SpO2: 99%   Weight: 211 lb (95.7 kg)   Height: 5' 5\" (1.651 m)   PainSc:   0 - No pain       Physical Exam  Vitals signs and nursing note reviewed. Exam conducted with a chaperone present. Cardiovascular:      Rate and Rhythm: Normal rate and regular rhythm. Pulses: Normal pulses. Heart sounds: Normal heart sounds. Pulmonary:      Effort: Pulmonary effort is normal.      Breath sounds: Normal breath sounds. Genitourinary:     Scrotum/Testes: Normal.      Prostate: Normal.         No visits with results within 3 Month(s) from this visit.    Latest known visit with results is:   Hospital Outpatient Visit on 10/15/2019   Component Date Value Ref Range Status    Right cca dist sys 10/15/2019 115.7  cm/s Final    Right CCA dist mora 10/15/2019 15.5  cm/s Final    RIGHT COMMON CAROTID ARTERY MID S 10/15/2019 138.43  cm/s Final    RIGHT COMMON CAROTID ARTERY MID D 10/15/2019 24.62  cm/s Final    Right CCA prox sys 10/15/2019 171.0  cm/s Final    Right CCA prox mora 10/15/2019 22.3  cm/s Final    Right ICA dist sys 10/15/2019 82.4  cm/s Final  Right ICA dist mora 10/15/2019 28.6  cm/s Final    Right ICA mid sys 10/15/2019 73.2  cm/s Final    Right ICA mid mora 10/15/2019 21.5  cm/s Final    Right ICA prox sys 10/15/2019 82.0  cm/s Final    Right ICA prox moar 10/15/2019 20.4  cm/s Final    Right vertebral sys 10/15/2019 41.0  cm/s Final    RIGHT VERTEBRAL ARTERY D 10/15/2019 15.37  cm/s Final    Right ICA/CCA sys 10/15/2019 0.7   Final    Left CCA dist sys 10/15/2019 88.8  cm/s Final    Left CCA dist mora 10/15/2019 17.6  cm/s Final    LEFT COMMON CAROTID ARTERY MID S 10/15/2019 109.18  cm/s Final    LEFT COMMON CAROTID ARTERY MID D 10/15/2019 21.97  cm/s Final    Left CCA prox sys 10/15/2019 150.0  cm/s Final    Left CCA prox mora 10/15/2019 26.9  cm/s Final    Left ICA dist sys 10/15/2019 50.6  cm/s Final    Left ICA dist mora 10/15/2019 21.8  cm/s Final    Left ICA mid sys 10/15/2019 46.5  cm/s Final    Left ICA mid mora 10/15/2019 21.2  cm/s Final    Left ICA prox sys 10/15/2019 67.8  cm/s Final    Left ICA prox mora 10/15/2019 17.2  cm/s Final    Left vertebral sys 10/15/2019 58.2  cm/s Final    LEFT VERTEBRAL ARTERY D 10/15/2019 19.19  cm/s Final    Left ICA/CCA sys 10/15/2019 0.76   Final    Right eca sys 10/15/2019 75.4  cm/s Final    Right subclavian sys 10/15/2019 106.7  cm/s Final    Left ECA sys 10/15/2019 81.0  cm/s Final    Left subclavian sys 10/15/2019 121.8  cm/s Final       .No results found for any visits on 07/22/20. Assessment / Plan:      ICD-10-CM ICD-9-CM    1. Essential hypertension  I10 401.9    2. Encounter for testicular exam  Z00.8 V72.85    3. Encounter for rectal exam  Z00.8 V72.85      HTN- stable. No change to current treatment plan  Paperwork completed for testicular and rectal exam  Work note given      Follow-up and Dispositions    · Return if symptoms worsen or fail to improve. I asked the patient if he  had any questions and answered his  questions.   The patient stated that he understands the treatment plan and agrees with the treatment plan    This document was created with a voice activated dictation system and may contain transcription errors.

## 2020-07-30 RX ORDER — TELMISARTAN 40 MG/1
TABLET ORAL
Qty: 90 TAB | Refills: 1 | Status: SHIPPED | OUTPATIENT
Start: 2020-07-30 | End: 2021-01-15

## 2020-07-30 NOTE — TELEPHONE ENCOUNTER
Requested Prescriptions     Pending Prescriptions Disp Refills    telmisartan (MICARDIS) 40 mg tablet 90 Tab 3     Sig: TAKE 1 TABLET BY MOUTH ONCE DAILY STOP  VALSTARTAN

## 2020-09-28 ENCOUNTER — TELEPHONE (OUTPATIENT)
Dept: FAMILY MEDICINE CLINIC | Age: 50
End: 2020-09-28

## 2020-09-28 NOTE — TELEPHONE ENCOUNTER
Patient called stating he has had recent weight loss and want to know if he needs lab work for that.   Please call him back at [phone]

## 2020-09-30 DIAGNOSIS — E78.5 DYSLIPIDEMIA: ICD-10-CM

## 2020-09-30 DIAGNOSIS — I10 ESSENTIAL HYPERTENSION: Primary | ICD-10-CM

## 2020-09-30 DIAGNOSIS — E11.9 CONTROLLED TYPE 2 DIABETES MELLITUS WITHOUT COMPLICATION, WITHOUT LONG-TERM CURRENT USE OF INSULIN (HCC): ICD-10-CM

## 2020-10-01 ENCOUNTER — VIRTUAL VISIT (OUTPATIENT)
Dept: FAMILY MEDICINE CLINIC | Age: 50
End: 2020-10-01
Payer: COMMERCIAL

## 2020-10-01 ENCOUNTER — TELEPHONE (OUTPATIENT)
Dept: FAMILY MEDICINE CLINIC | Age: 50
End: 2020-10-01

## 2020-10-01 ENCOUNTER — HOSPITAL ENCOUNTER (OUTPATIENT)
Dept: LAB | Age: 50
Discharge: HOME | End: 2020-10-01
Payer: COMMERCIAL

## 2020-10-01 DIAGNOSIS — Z12.5 PROSTATE CANCER SCREENING: ICD-10-CM

## 2020-10-01 DIAGNOSIS — E11.9 CONTROLLED TYPE 2 DIABETES MELLITUS WITHOUT COMPLICATION, WITHOUT LONG-TERM CURRENT USE OF INSULIN (HCC): ICD-10-CM

## 2020-10-01 DIAGNOSIS — R63.4 WEIGHT LOSS: Primary | ICD-10-CM

## 2020-10-01 DIAGNOSIS — I10 ESSENTIAL HYPERTENSION: ICD-10-CM

## 2020-10-01 DIAGNOSIS — E78.5 DYSLIPIDEMIA: ICD-10-CM

## 2020-10-01 DIAGNOSIS — M62.838 MUSCLE SPASM: ICD-10-CM

## 2020-10-01 LAB
ALBUMIN SERPL-MCNC: 4.2 G/DL (ref 3.4–5)
ALBUMIN/GLOB SERPL: 1.4 {RATIO} (ref 0.8–1.7)
ALP SERPL-CCNC: 86 U/L (ref 45–117)
ALT SERPL-CCNC: 35 U/L (ref 16–61)
ANION GAP SERPL CALC-SCNC: 6 MMOL/L (ref 3–18)
AST SERPL-CCNC: 16 U/L (ref 10–38)
BASOPHILS # BLD: 0 K/UL (ref 0–0.1)
BASOPHILS NFR BLD: 1 % (ref 0–2)
BILIRUB SERPL-MCNC: 0.4 MG/DL (ref 0.2–1)
BUN SERPL-MCNC: 14 MG/DL (ref 7–18)
BUN/CREAT SERPL: 15 (ref 12–20)
CALCIUM SERPL-MCNC: 9.6 MG/DL (ref 8.5–10.1)
CHLORIDE SERPL-SCNC: 101 MMOL/L (ref 100–111)
CHOLEST SERPL-MCNC: 262 MG/DL
CO2 SERPL-SCNC: 29 MMOL/L (ref 21–32)
CREAT SERPL-MCNC: 0.91 MG/DL (ref 0.6–1.3)
CREAT UR-MCNC: 36 MG/DL (ref 30–125)
DIFFERENTIAL METHOD BLD: NORMAL
EOSINOPHIL # BLD: 0.2 K/UL (ref 0–0.4)
EOSINOPHIL NFR BLD: 3 % (ref 0–5)
ERYTHROCYTE [DISTWIDTH] IN BLOOD BY AUTOMATED COUNT: 12.2 % (ref 11.6–14.5)
EST. AVERAGE GLUCOSE BLD GHB EST-MCNC: 298 MG/DL
GLOBULIN SER CALC-MCNC: 2.9 G/DL (ref 2–4)
GLUCOSE SERPL-MCNC: 393 MG/DL (ref 74–99)
HBA1C MFR BLD: 12 % (ref 4.2–5.6)
HCT VFR BLD AUTO: 42.2 % (ref 36–48)
HDLC SERPL-MCNC: 45 MG/DL (ref 40–60)
HDLC SERPL: 5.8 {RATIO} (ref 0–5)
HGB BLD-MCNC: 15 G/DL (ref 13–16)
LDLC SERPL CALC-MCNC: 142.6 MG/DL (ref 0–100)
LIPID PROFILE,FLP: ABNORMAL
LYMPHOCYTES # BLD: 1.9 K/UL (ref 0.9–3.6)
LYMPHOCYTES NFR BLD: 28 % (ref 21–52)
MCH RBC QN AUTO: 30 PG (ref 24–34)
MCHC RBC AUTO-ENTMCNC: 35.5 G/DL (ref 31–37)
MCV RBC AUTO: 84.4 FL (ref 74–97)
MICROALBUMIN UR-MCNC: 0.58 MG/DL (ref 0–3)
MICROALBUMIN/CREAT UR-RTO: 16 MG/G (ref 0–30)
MONOCYTES # BLD: 0.6 K/UL (ref 0.05–1.2)
MONOCYTES NFR BLD: 9 % (ref 3–10)
NEUTS SEG # BLD: 4 K/UL (ref 1.8–8)
NEUTS SEG NFR BLD: 59 % (ref 40–73)
PLATELET # BLD AUTO: 229 K/UL (ref 135–420)
PMV BLD AUTO: 11.1 FL (ref 9.2–11.8)
POTASSIUM SERPL-SCNC: 4.3 MMOL/L (ref 3.5–5.5)
PROT SERPL-MCNC: 7.1 G/DL (ref 6.4–8.2)
PSA SERPL-MCNC: 0.5 NG/ML (ref 0–4)
RBC # BLD AUTO: 5 M/UL (ref 4.7–5.5)
SODIUM SERPL-SCNC: 136 MMOL/L (ref 136–145)
TRIGL SERPL-MCNC: 372 MG/DL (ref ?–150)
VLDLC SERPL CALC-MCNC: 74.4 MG/DL
WBC # BLD AUTO: 6.6 K/UL (ref 4.6–13.2)

## 2020-10-01 PROCEDURE — 83036 HEMOGLOBIN GLYCOSYLATED A1C: CPT

## 2020-10-01 PROCEDURE — 85025 COMPLETE CBC W/AUTO DIFF WBC: CPT

## 2020-10-01 PROCEDURE — 82043 UR ALBUMIN QUANTITATIVE: CPT

## 2020-10-01 PROCEDURE — 36415 COLL VENOUS BLD VENIPUNCTURE: CPT

## 2020-10-01 PROCEDURE — 80061 LIPID PANEL: CPT

## 2020-10-01 PROCEDURE — 99213 OFFICE O/P EST LOW 20 MIN: CPT | Performed by: NURSE PRACTITIONER

## 2020-10-01 PROCEDURE — 84153 ASSAY OF PSA TOTAL: CPT

## 2020-10-01 PROCEDURE — 80053 COMPREHEN METABOLIC PANEL: CPT

## 2020-10-01 RX ORDER — IBUPROFEN 800 MG/1
TABLET ORAL
Qty: 90 TAB | Refills: 0 | Status: SHIPPED | OUTPATIENT
Start: 2020-10-01 | End: 2020-12-02 | Stop reason: SDUPTHER

## 2020-10-01 RX ORDER — TIZANIDINE 2 MG/1
TABLET ORAL
Qty: 90 TAB | Refills: 1 | Status: SHIPPED | OUTPATIENT
Start: 2020-10-01 | End: 2021-08-04 | Stop reason: SDUPTHER

## 2020-10-01 NOTE — PROGRESS NOTES
Sally Ross is a 51 y.o. male who was seen by synchronous (real-time) audio-video technology on 10/1/2020 for Weight Loss        Assessment & Plan:   Diagnoses and all orders for this visit:    1. Weight loss    2. Essential hypertension    3. Muscle spasm  -     ibuprofen (MOTRIN) 800 mg tablet; TAKE 1 TABLET BY MOUTH EVERY 8 HOURS AS NEEDED FOR PAIN  -     tiZANidine (ZANAFLEX) 2 mg tablet; TAKE 1 TABLET BY MOUTH THREE TIMES DAILY AS NEEDED    4. Prostate cancer screening  -     PSA SCREENING (SCREENING); Future            Subjective: The patient presents for an Audio-visual teleconference appointment for weight loss. Patient notes that he has lost approximately 18 pounds since March, 2020. The weight has been unintentional.  He is scheduled to have a colonoscopy in December, 2020 and was evaluated by colorectal specialist in March, 2020 secondary to rectal bleeding and pain. Per the patient the rectal bleeding the pain was secondary to a fissure. He is able to feel the weight loss secondary to his belt not fitting. He notes nothing is hurting him other than his neuropathy but he denies any chest or abdominal pain. He continues to have double bowel movement but does note his stools are hard at times. He is scheduled for fasting labs as he is a diabetic. He denies any polyphagia, polyuria or polydipsia  Prior to Admission medications    Medication Sig Start Date End Date Taking?  Authorizing Provider   ibuprofen (MOTRIN) 800 mg tablet TAKE 1 TABLET BY MOUTH EVERY 8 HOURS AS NEEDED FOR PAIN 10/1/20  Yes Tony Caba NP   tiZANidine (ZANAFLEX) 2 mg tablet TAKE 1 TABLET BY MOUTH THREE TIMES DAILY AS NEEDED 10/1/20  Yes Tony Caba NP   telmisartan (MICARDIS) 40 mg tablet TAKE 1 TABLET BY MOUTH ONCE DAILY STOP  VALSTARTAN 7/30/20  Yes Tony Caba NP   labetaloL (NORMODYNE) 100 mg tablet TAKE 1 TABLET BY MOUTH TWICE DAILY ([address]) 4/3/20  Yes Cuong Mansi Reaves NP   metFORMIN (GLUCOPHAGE) 500 mg tablet TAKE 1 TABLET BY MOUTH TWICE DAILY WITH MEALS 11/24/19  Yes Annalise Crespo MD   aspirin delayed-release 81 mg tablet Take  by mouth daily. Yes Provider, Historical   ibuprofen (MOTRIN) 800 mg tablet TAKE 1 TABLET BY MOUTH EVERY 8 HOURS AS NEEDED FOR PAIN 6/16/20 10/1/20  Sharon RONI Van   tiZANidine (ZANAFLEX) 2 mg tablet TAKE 1 TABLET BY MOUTH THREE TIMES DAILY AS NEEDED 3/5/20 10/1/20  Sharon RONI Van   rosuvastatin (CRESTOR) 5 mg tablet Take 1 Tab by mouth nightly. 2/18/20   Torito See MD   DULoxetine (CYMBALTA) 20 mg capsule 1 capsule daily 10/4/19   Annalise Crespo MD   traMADol Leigh Ann Kimble) 50 mg tablet Take 1 Tab by mouth every six (6) hours as needed for Pain. Max Daily Amount: 200 mg. 12/26/18   Sharon RONI Van     Patient Active Problem List   Diagnosis Code    Pulmonary hypertension ([facility] [address] I27.20    Skin lesion L98.9    Bursitis disorder E51.0    Umbilical hernia without obstruction or gangrene K42.9    Reflux [id number]    Gastroesophageal reflux disease without esophagitis K21.9    Palpitations R00.2    Right lower quadrant abdominal pain R10.31    Essential hypertension I10    LBBB (left bundle branch block) I44.7    Spinal stenosis, lumbar region with neurogenic claudication M48.062    Lumbar neuritis M54.16    DDD (degenerative disc disease), lumbar M51.36    Lumbar radiculopathy M54.16    Epidural lipomatosis D17.79    Cauda equina compression (HCC) G83.4    S/P lumbar laminectomy Z98.890    Severe obesity (BMI 35.0-39. 9) with comorbidity (HCC) E66.01    Myofacial muscle pain M79.18    Chronic bilateral low back pain without sciatica M54.5, G89.29    Controlled type 2 diabetes mellitus without complication, without long-term current use of insulin (HCC) E11.9    Obesity (BMI 35.0-39.9 without comorbidity) E66.9    Type 2 diabetes mellitus with diabetic neuropathy ([facility] E11.40    Dyslipidemia E78.5     Patient Active Problem List    Diagnosis Date Noted    Dyslipidemia 02/18/2020    Type 2 diabetes mellitus with diabetic neuropathy ([facility] 10/04/2019    Controlled type 2 diabetes mellitus without complication, without long-term current use of insulin ([facility] 09/19/2018    Obesity (BMI 35.0-39.9 without comorbidity) 09/19/2018    Myofacial muscle pain 08/21/2018    Chronic bilateral low back pain without sciatica 08/21/2018    Severe obesity (BMI 35.0-39. 9) with comorbidity ([facility] 03/23/2018    S/P lumbar laminectomy 02/22/2018    Cauda equina compression (HCC) 02/08/2018    Lumbar radiculopathy 02/02/2018    Epidural lipomatosis 02/02/2018    Spinal stenosis, lumbar region with neurogenic claudication 01/03/2018    Lumbar neuritis 01/03/2018    DDD (degenerative disc disease), lumbar 01/03/2018    LBBB (left bundle branch block) 12/28/2016    Essential hypertension 12/12/2016    Right lower quadrant abdominal pain 07/18/2016    Gastroesophageal reflux disease without esophagitis 02/07/2016    Palpitations 02/07/2016    Reflux 01/28/2016    Skin lesion     Bursitis disorder     Umbilical hernia without obstruction or gangrene     Pulmonary hypertension (HCC) 06/21/2012     Current Outpatient Medications   Medication Sig Dispense Refill    ibuprofen (MOTRIN) 800 mg tablet TAKE 1 TABLET BY MOUTH EVERY 8 HOURS AS NEEDED FOR PAIN 90 Tab 0    tiZANidine (ZANAFLEX) 2 mg tablet TAKE 1 TABLET BY MOUTH THREE TIMES DAILY AS NEEDED 90 Tab 1    telmisartan (MICARDIS) 40 mg tablet TAKE 1 TABLET BY MOUTH ONCE DAILY STOP  VALSTARTAN 90 Tab 1    labetaloL (NORMODYNE) 100 mg tablet TAKE 1 TABLET BY MOUTH TWICE DAILY (GENERIC  FOR  NORMODYNE) 180 Tab 1    metFORMIN (GLUCOPHAGE) 500 mg tablet TAKE 1 TABLET BY MOUTH TWICE DAILY WITH MEALS 180 Tab 1    aspirin delayed-release 81 mg tablet Take  by mouth daily.  rosuvastatin (CRESTOR) 5 mg tablet Take 1 Tab by mouth nightly.  30 Tab 3    DULoxetine (CYMBALTA) 20 mg capsule 1 capsule daily 30 Cap 3    traMADol (ULTRAM) 50 mg tablet Take 1 Tab by mouth every six (6) hours as needed for Pain. Max Daily Amount: 200 mg. 24 Tab 1     Allergies   Allergen Reactions    Iodine Unknown (comments)    Lipitor [Atorvastatin] Nausea Only     Past Medical History:   Diagnosis Date    Acute sinusitis     Back pain     BBB (bundle branch block)     Bursitis disorder     Cardiac cath 07/30/2012    R dom. Patent coronary arteries. LVEDP 15.  RA 10.  RV 32/10. PA 32/18. Mean PASP 22. W 14.  CO 5.7 (TD). High CO & high O2 sats on right raise question of peripheral arterial venous shunting.  Cardiac echocardiogram 06/27/2012    EF 50-55%. Dyssynergic septal motion c/w LBBB. RVSP/PASP 50-55. Mild LAE. Mild-mod TR. Mild IVCE. 30 x 5-mm echodensity in interventricular groove (poss fibrinous deposit). Sm pericardial effus at apex.  Diabetes ([address]     Hx of cardiac cath     Braeden's Daughters at about 12years old.  Hypertension     Neuropathy     bilateral in feet    Obesity     Other ill-defined conditions(799.89)     heart cath 2012    Skin lesion     Umbilical hernia without obstruction or gangrene     Unspecified sleep apnea     no diagnosed       ROS  Constitutional: No apparent distress noted  General- weight loss (18 lbs)  Eyes- negative visual changes  CV- denies chest pain, palpitation  Pul: negative cough or SOB  GI: negative nausea, flank pain, diarrhea, constipation  Urinary:- No dysuria or polyuria  MS- negative myalgia, negative joint pain  Neuro- negative headache, dizziness or weakness  Skin- negative for rashes or lesions. Psych- denies any anxiety or depression    Objective:   No flowsheet data found.      [INSTRUCTIONS:  \"[x]\" Indicates a positive item  \"[]\" Indicates a negative item  -- DELETE ALL ITEMS NOT EXAMINED]    Constitutional: [x] Appears well-developed and well-nourished [x] No apparent distress      [] Abnormal -     Mental status: [x] Alert and awake  [x] Oriented to person/place/time [x] Able to follow commands    [] Abnormal -     Eyes:   EOM    [x]  Normal    [] Abnormal -   Sclera  [x]  Normal    [] Abnormal -          Discharge [x]  None visible   [] Abnormal -     HENT: [x] Normocephalic, atraumatic  [] Abnormal -   [x] Mouth/Throat: Mucous membranes are moist    External Ears [x] Normal  [] Abnormal -    Neck: [x] No visualized mass [] Abnormal -     Pulmonary/Chest: [x] Respiratory effort normal   [x] No visualized signs of difficulty breathing or respiratory distress        [] Abnormal -      Musculoskeletal:   [x] Normal gait with no signs of ataxia         [x] Normal range of motion of neck        [] Abnormal -     Neurological:        [x] No Facial Asymmetry (Cranial nerve 7 motor function) (limited exam due to video visit)          [x] No gaze palsy        [] Abnormal -          Skin:        [x] No significant exanthematous lesions or discoloration noted on facial skin         [] Abnormal -            Psychiatric:       [x] Normal Affect [] Abnormal -        [x] No Hallucinations    Other pertinent observable physical exam findings:-        We discussed the expected course, resolution and complications of the diagnosis(es) in detail. Medication risks, benefits, costs, interactions, and alternatives were discussed as indicated. I advised him to contact the office if his condition worsens, changes or fails to improve as anticipated. He expressed understanding with the diagnosis(es) and plan. Tammy Corrales, who was evaluated through a patient-initiated, synchronous (real-time) audio-video encounter, and/or his healthcare decision maker, is aware that it is a billable service, with coverage as determined by his insurance carrier. He provided verbal consent to proceed: Yes, and patient identification was verified.  It was conducted pursuant to the emergency declaration under the [address] and the National Emergencies Act, [facility] authority and the [employer] and [employer] General Act. A caregiver was present when appropriate. Ability to conduct physical exam was limited. I was at home. The patient was at home.       Atul Mccoy NP

## 2020-10-01 NOTE — PROGRESS NOTES
Lam Pereira presents today for   Chief Complaint   Patient presents with    Weight Loss       Virtual/telephone visit    Depression Screening:  3 most recent PHQ Screens 10/1/2020   PHQ Not Done -   Little interest or pleasure in doing things Not at all   Feeling down, depressed, irritable, or hopeless Not at all   Total Score PHQ 2 0       Learning Assessment:  Learning Assessment 2/18/2020   PRIMARY LEARNER Patient   HIGHEST LEVEL OF EDUCATION - PRIMARY LEARNER  -   BARRIERS PRIMARY LEARNER -   [facility]    NEED -   LEARNER PREFERENCE PRIMARY DEMONSTRATION   ANSWERED BY Patient   RELATIONSHIP SELF       Health Maintenance reviewed and discussed and ordered per Provider. Health Maintenance Due   Topic Date Due    Eye Exam Retinal or Dilated  11/09/1981    DTaP/Tdap/Td series (1 - Tdap) 11/09/1992    A1C test (Diabetic or Prediabetic)  07/17/2020    MICROALBUMIN Q1  07/17/2020    Lipid Screen  07/17/2020    Flu Vaccine (1) 09/01/2020    Colonoscopy  09/22/2020    Foot Exam Q1  10/04/2020   . Coordination of Care:  1. Have you been to the ER, urgent care clinic since your last visit? Hospitalized since your last visit? no    2. Have you seen or consulted any other health care providers outside of the [address] [place] [address] since your last visit? Include any pap smears or colon screening.  no

## 2020-10-01 NOTE — TELEPHONE ENCOUNTER
Patient called stating he needs a work note for today. Going back to work tomorrow.   Please call him back at [phone]

## 2020-10-13 ENCOUNTER — TELEPHONE (OUTPATIENT)
Dept: FAMILY MEDICINE CLINIC | Age: 50
End: 2020-10-13

## 2020-10-13 ENCOUNTER — VIRTUAL VISIT (OUTPATIENT)
Dept: FAMILY MEDICINE CLINIC | Age: 50
End: 2020-10-13
Payer: COMMERCIAL

## 2020-10-13 DIAGNOSIS — E11.40 TYPE 2 DIABETES MELLITUS WITH DIABETIC NEUROPATHY, WITHOUT LONG-TERM CURRENT USE OF INSULIN (HCC): ICD-10-CM

## 2020-10-13 DIAGNOSIS — I10 ESSENTIAL HYPERTENSION: Primary | ICD-10-CM

## 2020-10-13 PROCEDURE — 99213 OFFICE O/P EST LOW 20 MIN: CPT | Performed by: NURSE PRACTITIONER

## 2020-10-13 RX ORDER — METFORMIN HYDROCHLORIDE 500 MG/1
500 TABLET, EXTENDED RELEASE ORAL
Qty: 30 TAB | Refills: 1 | Status: SHIPPED | OUTPATIENT
Start: 2020-10-13 | End: 2021-08-04 | Stop reason: ALTCHOICE

## 2020-10-13 RX ORDER — GLYBURIDE 2.5 MG/1
2.5 TABLET ORAL
Qty: 90 TAB | Refills: 0 | Status: SHIPPED | OUTPATIENT
Start: 2020-10-13 | End: 2021-01-15

## 2020-10-13 RX ORDER — LABETALOL 100 MG/1
TABLET, FILM COATED ORAL
Qty: 180 TAB | Refills: 1 | Status: SHIPPED | OUTPATIENT
Start: 2020-10-13 | End: 2021-09-14

## 2020-10-13 RX ORDER — GABAPENTIN 300 MG/1
300 CAPSULE ORAL 2 TIMES DAILY
Qty: 60 CAP | Refills: 2 | Status: SHIPPED | OUTPATIENT
Start: 2020-10-13 | End: 2021-04-18 | Stop reason: SDUPTHER

## 2020-10-13 NOTE — LETTER
NOTIFICATION RETURN TO WORK / SCHOOL 
 
10/13/2020 2:51 PM 
 
Mr. Cruz Billings [address] [id number] To Whom It May Concern: Cruz Billings is currently under the care of Su Moran. He will return to work/school on 10/14/2020. If there are questions or concerns please have the patient contact our office. Sincerely, 
 
 
Michel Bush NP  
(Please accept electronic signature)

## 2020-10-13 NOTE — TELEPHONE ENCOUNTER
Patient states he had a phone visit with Andrea Duenas today. He forgot to request a work note to return tomorrow, Wednesday 10/14. He had to take off work today for his visit. Please advise.

## 2020-10-13 NOTE — PROGRESS NOTES
Alicia Box presents today for   Chief Complaint   Patient presents with    Labs     results       Virtual/telephone visit    Depression Screening:  3 most recent PHQ Screens 10/13/2020   PHQ Not Done -   Little interest or pleasure in doing things Not at all   Feeling down, depressed, irritable, or hopeless Not at all   Total Score PHQ 2 0       Learning Assessment:  Learning Assessment 2/18/2020   PRIMARY LEARNER Patient   HIGHEST LEVEL OF EDUCATION - PRIMARY LEARNER  -   BARRIERS PRIMARY LEARNER -   [facility]    NEED -   LEARNER PREFERENCE PRIMARY DEMONSTRATION   ANSWERED BY Patient   RELATIONSHIP SELF       Health Maintenance reviewed and discussed and ordered per Provider. Health Maintenance Due   Topic Date Due    Eye Exam Retinal or Dilated  11/09/1981    DTaP/Tdap/Td series (1 - Tdap) 11/09/1992    Foot Exam Q1  10/04/2020   . Coordination of Care:  1. Have you been to the ER, urgent care clinic since your last visit? Hospitalized since your last visit? no    2. Have you seen or consulted any other health care providers outside of the [address] [place] [address] since your last visit? Include any pap smears or colon screening.  no

## 2020-10-13 NOTE — PROGRESS NOTES
Alber Greco is a 51 y.o. male who was seen by synchronous (real-time) audio-video technology on 10/13/2020 for Labs (results)    Assessment & Plan:   Diagnoses and all orders for this visit:    1. Essential hypertension  -     labetaloL (NORMODYNE) 100 mg tablet; TAKE 1 TABLET BY MOUTH TWICE DAILY (GENERIC  FOR  [address] [place] [address])    2. Type 2 diabetes mellitus with diabetic neuropathy, without long-term current use of insulin (HCC)  -     SITagliptin (JANUVIA) 25 mg tablet; Take 1 Tab by mouth daily. -     glyBURIDE (DIABETA) 2.5 mg tablet; Take 1 Tab by mouth Daily (before breakfast). -     metFORMIN ER (GLUCOPHAGE XR) 500 mg tablet; Take 1 Tab by mouth daily (with dinner). -     gabapentin (NEURONTIN) 300 mg capsule; Take 1 Cap by mouth two (2) times a day. Max Daily Amount: 600 mg. Repeat Fasting glucose in the office in 6 weeks        Subjective: The patient presents for an Audio-visual teleconference appointment for diabetes follow-up care. He had fasting labs prior to todays office visit and the results were reviewed. HTN- notes his BP has been stable. He is compliant with the medication treatment plan and denies any chest pain, palpitation or lower extremity edema. DM-Hgb A!c 12.o on 10/01/20 and prior Hgb A!C 7. 3. He denies any polyuria, polydipsia or polyphagia. Prior to Admission medications    Medication Sig Start Date End Date Taking? Authorizing Provider   labetaloL (NORMODYNE) 100 mg tablet TAKE 1 TABLET BY MOUTH TWICE DAILY (GENERIC  FOR  [address] [place] [address]) 10/13/20  Yes Vernida Decant, NP   SITagliptin (JANUVIA) 25 mg tablet Take 1 Tab by mouth daily. 10/13/20  Yes Vernida Decant, NP   glyBURIDE (DIABETA) 2.5 mg tablet Take 1 Tab by mouth Daily (before breakfast). 10/13/20  Yes Vernida Decant, NP   metFORMIN ER (GLUCOPHAGE XR) 500 mg tablet Take 1 Tab by mouth daily (with dinner).  10/13/20  Yes Vernida Decant, NP   gabapentin (NEURONTIN) 300 mg capsule Take 1 Cap by mouth two (2) times a day. Max Daily Amount: 600 mg. 10/13/20  Yes Medardo Hankins NP   ibuprofen (MOTRIN) 800 mg tablet TAKE 1 TABLET BY MOUTH EVERY 8 HOURS AS NEEDED FOR PAIN 10/1/20  Yes Medardo Hankins NP   tiZANidine (ZANAFLEX) 2 mg tablet TAKE 1 TABLET BY MOUTH THREE TIMES DAILY AS NEEDED 10/1/20  Yes Medardo Hankins NP   telmisartan (MICARDIS) 40 mg tablet TAKE 1 TABLET BY MOUTH ONCE DAILY STOP  VALSTARTAN 7/30/20  Yes Medardo Hankins NP   rosuvastatin (CRESTOR) 5 mg tablet Take 1 Tab by mouth nightly. 2/18/20  Yes Marlin Lowry MD   aspirin delayed-release 81 mg tablet Take  by mouth daily. Yes Provider, Historical     Patient Active Problem List   Diagnosis Code    [facility] hypertension ([facility] I27.20    Skin lesion L98.9    Bursitis disorder Z77.3    Umbilical hernia without obstruction or gangrene K42.9    Reflux [id number]    Gastroesophageal reflux disease without esophagitis K21.9    Palpitations R00.2    Right lower quadrant abdominal pain R10.31    Essential hypertension I10    LBBB (left bundle branch block) I44.7    Spinal stenosis, lumbar region with neurogenic claudication M48.062    Lumbar neuritis M54.16    DDD (degenerative disc disease), lumbar M51.36    Lumbar radiculopathy M54.16    Epidural lipomatosis D17.79    Cauda equina compression (HCC) G83.4    S/P lumbar laminectomy Z98.890    Severe obesity (BMI 35.0-39. 9) with comorbidity (HCC) E66.01    Myofacial muscle pain M79.18    Chronic bilateral low back pain without sciatica M54.5, G89.29    Controlled type 2 diabetes mellitus without complication, without long-term current use of insulin (HCC) E11.9    Obesity (BMI 35.0-39.9 without comorbidity) E66.9    Type 2 diabetes mellitus with diabetic neuropathy ([facility] E11.40    Dyslipidemia E78.5     Patient Active Problem List    Diagnosis Date Noted    Dyslipidemia 02/18/2020    Type 2 diabetes mellitus with diabetic neuropathy ([facility] 10/04/2019    Controlled type 2 diabetes mellitus without complication, without long-term current use of insulin ([facility] 09/19/2018    Obesity (BMI 35.0-39.9 without comorbidity) 09/19/2018    Myofacial muscle pain 08/21/2018    Chronic bilateral low back pain without sciatica 08/21/2018    Severe obesity (BMI 35.0-39. 9) with comorbidity ([facility] 03/23/2018    S/P lumbar laminectomy 02/22/2018    Cauda equina compression (HCC) 02/08/2018    Lumbar radiculopathy 02/02/2018    Epidural lipomatosis 02/02/2018    Spinal stenosis, lumbar region with neurogenic claudication 01/03/2018    Lumbar neuritis 01/03/2018    DDD (degenerative disc disease), lumbar 01/03/2018    LBBB (left bundle branch block) 12/28/2016    Essential hypertension 12/12/2016    Right lower quadrant abdominal pain 07/18/2016    Gastroesophageal reflux disease without esophagitis 02/07/2016    Palpitations 02/07/2016    Reflux 01/28/2016    Skin lesion     Bursitis disorder     Umbilical hernia without obstruction or gangrene     Pulmonary hypertension (HCC) 06/21/2012     Current Outpatient Medications   Medication Sig Dispense Refill    labetaloL (NORMODYNE) 100 mg tablet TAKE 1 TABLET BY MOUTH TWICE DAILY (GENERIC  FOR  NORMODYNE) 180 Tab 1    SITagliptin (JANUVIA) 25 mg tablet Take 1 Tab by mouth daily. 90 Tab 0    glyBURIDE (DIABETA) 2.5 mg tablet Take 1 Tab by mouth Daily (before breakfast). 90 Tab 0    metFORMIN ER (GLUCOPHAGE XR) 500 mg tablet Take 1 Tab by mouth daily (with dinner). 30 Tab 1    gabapentin (NEURONTIN) 300 mg capsule Take 1 Cap by mouth two (2) times a day.  Max Daily Amount: 600 mg. 60 Cap 2    ibuprofen (MOTRIN) 800 mg tablet TAKE 1 TABLET BY MOUTH EVERY 8 HOURS AS NEEDED FOR PAIN 90 Tab 0    tiZANidine (ZANAFLEX) 2 mg tablet TAKE 1 TABLET BY MOUTH THREE TIMES DAILY AS NEEDED 90 Tab 1    telmisartan (MICARDIS) 40 mg tablet TAKE 1 TABLET BY MOUTH ONCE DAILY STOP  VALSTARTAN 90 Tab 1    rosuvastatin (CRESTOR) 5 mg tablet Take 1 Tab by mouth nightly. 30 Tab 3    aspirin delayed-release 81 mg tablet Take  by mouth daily. Allergies   Allergen Reactions    Iodine Unknown (comments)    Lipitor [Atorvastatin] Nausea Only     Past Medical History:   Diagnosis Date    Acute sinusitis     Back pain     BBB (bundle branch block)     Bursitis disorder     Cardiac cath 07/30/2012    R dom. Patent coronary arteries. LVEDP 15.  RA 10.  RV 32/10. PA 32/18. Mean PASP 22. W 14.  CO 5.7 (TD). High CO & high O2 sats on right raise question of peripheral arterial venous shunting.  Cardiac echocardiogram 06/27/2012    EF 50-55%. Dyssynergic septal motion c/w LBBB. RVSP/PASP 50-55. Mild LAE. Mild-mod TR. Mild IVCE. 30 x 5-mm echodensity in interventricular groove (poss fibrinous deposit). Sm pericardial effus at apex.  Diabetes ([address]     Hx of cardiac cath     Braeden's Daughters at about 12years old.  Hypertension     Neuropathy     bilateral in feet    Obesity     Other ill-defined conditions(799.89)     heart cath 2012    Skin lesion     Umbilical hernia without obstruction or gangrene     Unspecified sleep apnea     no diagnosed       ROS    Constitutional: No apparent distress noted  General- negative for fever, chills or fatigue  Eyes- negative visual changes  CV- denies chest pain, palpitation  Pul: negative cough or SOB  GI: negative nausea, flank pain, diarrhea, constipation  Urinary:- No dysuria or polyuria  MS- negative myalgia, negative joint pain  Neuro- negative headache, dizziness or weakness  Skin- negative for rashes or lesions. Psych- denies any anxiety or depression    Objective:   No flowsheet data found.      [INSTRUCTIONS:  \"[x]\" Indicates a positive item  \"[]\" Indicates a negative item  -- DELETE ALL ITEMS NOT EXAMINED]    Constitutional: [x] Appears well-developed and well-nourished [x] No apparent distress      [] Abnormal -     Mental status: [x] Alert and awake  [x] Oriented to person/place/time [x] Able to follow commands    [] Abnormal -     Eyes:   EOM    [x]  Normal    [] Abnormal -   Sclera  [x]  Normal    [] Abnormal -          Discharge [x]  None visible   [] Abnormal -     HENT: [x] Normocephalic, atraumatic  [] Abnormal -   [x] Mouth/Throat: Mucous membranes are moist    External Ears [x] Normal  [] Abnormal -    Neck: [x] No visualized mass [] Abnormal -     Pulmonary/Chest: [x] Respiratory effort normal   [x] No visualized signs of difficulty breathing or respiratory distress        [] Abnormal -      Musculoskeletal:   [x] Normal gait with no signs of ataxia         [x] Normal range of motion of neck        [] Abnormal -     Neurological:        [x] No Facial Asymmetry (Cranial nerve 7 motor function) (limited exam due to video visit)          [x] No gaze palsy        [] Abnormal -          Skin:        [x] No significant exanthematous lesions or discoloration noted on facial skin         [] Abnormal -            Psychiatric:       [x] Normal Affect [] Abnormal -        [x] No Hallucinations    Other pertinent observable physical exam findings:-        We discussed the expected course, resolution and complications of the diagnosis(es) in detail. Medication risks, benefits, costs, interactions, and alternatives were discussed as indicated. I advised him to contact the office if his condition worsens, changes or fails to improve as anticipated. He expressed understanding with the diagnosis(es) and plan. Adria Montalvo, who was evaluated through a patient-initiated, synchronous (real-time) audio-video encounter, and/or his healthcare decision maker, is aware that it is a billable service, with coverage as determined by his insurance carrier. He provided verbal consent to proceed: Yes, and patient identification was verified.  It was conducted pursuant to the emergency declaration under the [facility] waiver authority and the [employer] General Act. A caregiver was present when appropriate. Ability to conduct physical exam was limited. I was at home. The patient was at home.       Joseluis Toledo NP

## 2020-11-11 ENCOUNTER — OFFICE VISIT (OUTPATIENT)
Dept: FAMILY MEDICINE CLINIC | Age: 50
End: 2020-11-11
Payer: COMMERCIAL

## 2020-11-11 VITALS
OXYGEN SATURATION: 95 % | BODY MASS INDEX: 33.82 KG/M2 | HEIGHT: 65 IN | HEART RATE: 83 BPM | RESPIRATION RATE: 18 BRPM | WEIGHT: 203 LBS | DIASTOLIC BLOOD PRESSURE: 73 MMHG | SYSTOLIC BLOOD PRESSURE: 109 MMHG | TEMPERATURE: 98.1 F

## 2020-11-11 DIAGNOSIS — D22.9 ATYPICAL NEVI: Primary | ICD-10-CM

## 2020-11-11 PROCEDURE — 99213 OFFICE O/P EST LOW 20 MIN: CPT | Performed by: NURSE PRACTITIONER

## 2020-11-11 NOTE — PROGRESS NOTES
Guillermina Valdivia is a 53 y.o. male presenting today for Skin Problem (Black spot on back near surgery scar )      HPI:  Guillermina Valdivia presents to the office today for complaints of an abnormal skin lesion. He notes he noticed the new skin lesion to his lumbar back region approximately 2 weeks. He previously had skin lesion removed from his back. The lesion is non-pruritic. He denies any cough, fever, loss of smell/taste or GI upset. Review of Systems   Constitutional: Negative for fever and weight loss. Respiratory: Negative for cough and sputum production. Cardiovascular: Negative for chest pain and palpitations. Skin:        Skin lesion       Allergies   Allergen Reactions    Iodine Unknown (comments)    Lipitor [Atorvastatin] Nausea Only       Current Outpatient Medications   Medication Sig Dispense Refill    labetaloL (NORMODYNE) 100 mg tablet TAKE 1 TABLET BY MOUTH TWICE DAILY (GENERIC  FOR  NORMODYNE) 180 Tab 1    glyBURIDE (DIABETA) 2.5 mg tablet Take 1 Tab by mouth Daily (before breakfast). 90 Tab 0    metFORMIN ER (GLUCOPHAGE XR) 500 mg tablet Take 1 Tab by mouth daily (with dinner). 30 Tab 1    gabapentin (NEURONTIN) 300 mg capsule Take 1 Cap by mouth two (2) times a day. Max Daily Amount: 600 mg. 60 Cap 2    ibuprofen (MOTRIN) 800 mg tablet TAKE 1 TABLET BY MOUTH EVERY 8 HOURS AS NEEDED FOR PAIN 90 Tab 0    tiZANidine (ZANAFLEX) 2 mg tablet TAKE 1 TABLET BY MOUTH THREE TIMES DAILY AS NEEDED 90 Tab 1    telmisartan (MICARDIS) 40 mg tablet TAKE 1 TABLET BY MOUTH ONCE DAILY STOP  VALSTARTAN 90 Tab 1    rosuvastatin (CRESTOR) 5 mg tablet Take 1 Tab by mouth nightly. 30 Tab 3    SITagliptin (JANUVIA) 25 mg tablet Take 1 Tab by mouth daily. 90 Tab 0    aspirin delayed-release 81 mg tablet Take  by mouth daily. Past Medical History:   Diagnosis Date    Acute sinusitis     Back pain     BBB (bundle branch block)     Bursitis disorder     Cardiac cath 07/30/2012    ARELI marquez Patent coronary arteries. LVEDP 15.  RA 10.  RV 32/10. PA 32/18. Mean PASP 22. W 14.  CO 5.7 (TD). High CO & high O2 sats on right raise question of peripheral arterial venous shunting.  Cardiac echocardiogram 06/27/2012    EF 50-55%. Dyssynergic septal motion c/w LBBB. RVSP/PASP 50-55. Mild LAE. Mild-mod TR. Mild IVCE. 30 x 5-mm echodensity in interventricular groove (poss fibrinous deposit). Sm pericardial effus at apex.  Diabetes ([facility]     Hx of cardiac cath     Braeden's Daughters at about 12years old.  Hypertension     Neuropathy     bilateral in feet    Obesity     Other ill-defined conditions(799.89)     heart cath 2012    Skin lesion     Umbilical hernia without obstruction or gangrene     Unspecified sleep apnea     no diagnosed       Past Surgical History:   Procedure Laterality Date    HX COLONOSCOPY  09/2017    hx of polyps    HX HEART CATHETERIZATION  07/30/12    HX HEENT      dental extract    HX HERNIA REPAIR  2014    HX LUMBAR LAMINECTOMY  02/08/2018    L4 laminectomy, L5 laminectomy       Social History     Socioeconomic History    Marital status:      Spouse name: Not on file    Number of children: Not on file    Years of education: Not on file    Highest education level: Not on file   Occupational History    Not on file   Social Needs    Financial resource strain: Not on file    Food insecurity     Worry: Not on file     Inability: Not on file    Transportation needs     Medical: Not on file     Non-medical: Not on file   Tobacco Use    Smoking status: Never Smoker    Smokeless tobacco: Never Used   Substance and Sexual Activity    Alcohol use:  Yes     Alcohol/week: 0.0 standard drinks     Types: 2 - 3 Standard drinks or equivalent per week     Comment: \"few drinks every evening\"    Drug use: No    Sexual activity: Yes     Partners: Female   Lifestyle    Physical activity     Days per week: Not on file     Minutes per session: Not on file    Stress: Not on file   Relationships    Social connections     Talks on phone: Not on file     Gets together: Not on file     Attends Jewish service: Not on file     Active member of club or organization: Not on file     Attends meetings of clubs or organizations: Not on file     Relationship status: Not on file    Intimate partner violence     Fear of current or ex partner: Not on file     Emotionally abused: Not on file     Physically abused: Not on file     Forced sexual activity: Not on file   Other Topics Concern    Not on file   Social History Narrative    Not on file         Vitals:    11/11/20 0802   BP: 109/73   Pulse: 83   Resp: 18   Temp: 98.1 °F (36.7 °C)   TempSrc: Oral   SpO2: 95%   Weight: 203 lb (92.1 kg)   Height: 5' 5\" (1.651 m)   PainSc:   2   PainLoc: Back       Physical Exam  Vitals signs and nursing note reviewed. Constitutional:       Appearance: Normal appearance. Cardiovascular:      Rate and Rhythm: Normal rate and regular rhythm. Pulses: Normal pulses. Heart sounds: Normal heart sounds. Pulmonary:      Effort: Pulmonary effort is normal.      Breath sounds: Normal breath sounds. Skin:     General: Skin is warm and dry. Neurological:      Mental Status: He is alert.          Hospital Outpatient Visit on 10/01/2020   Component Date Value Ref Range Status    WBC 10/01/2020 6.6  4.6 - 13.2 K/uL Final    RBC 10/01/2020 5.00  4.70 - 5.50 M/uL Final    HGB 10/01/2020 15.0  13.0 - 16.0 g/dL Final    HCT 10/01/2020 42.2  36.0 - 48.0 % Final    MCV 10/01/2020 84.4  74.0 - 97.0 FL Final    MCH 10/01/2020 30.0  24.0 - 34.0 PG Final    MCHC 10/01/2020 35.5  31.0 - 37.0 g/dL Final    RDW 10/01/2020 12.2  11.6 - 14.5 % Final    PLATELET 01/56/7516 288  135 - 420 K/uL Final    MPV 10/01/2020 11.1  9.2 - 11.8 FL Final    NEUTROPHILS 10/01/2020 59  40 - 73 % Final    LYMPHOCYTES 10/01/2020 28  21 - 52 % Final    MONOCYTES 10/01/2020 9  3 - 10 % Final    EOSINOPHILS 10/01/2020 3  0 - 5 % Final    BASOPHILS 10/01/2020 1  0 - 2 % Final    ABS. NEUTROPHILS 10/01/2020 4.0  1.8 - 8.0 K/UL Final    ABS. LYMPHOCYTES 10/01/2020 1.9  0.9 - 3.6 K/UL Final    ABS. MONOCYTES 10/01/2020 0.6  0.05 - 1.2 K/UL Final    ABS. EOSINOPHILS 10/01/2020 0.2  0.0 - 0.4 K/UL Final    ABS. BASOPHILS 10/01/2020 0.0  0.0 - 0.1 K/UL Final    DF 10/01/2020 AUTOMATED    Final    LIPID PROFILE 10/01/2020        Final    Cholesterol, total 10/01/2020 262* <200 MG/DL Final    Triglyceride 10/01/2020 372* <150 MG/DL Final    Comment: The drugs N-acetylcysteine (NAC) and  Metamiszole have been found to cause falsely  low results in this chemical assay. Please  be sure to submit blood samples obtained  BEFORE administration of either of these  drugs to assure correct results.  HDL Cholesterol 10/01/2020 45  40 - 60 MG/DL Final    LDL, calculated 10/01/2020 142.6* 0 - 100 MG/DL Final    VLDL, calculated 10/01/2020 74.4  MG/DL Final    CHOL/HDL Ratio 10/01/2020 5.8* 0 - 5.0   Final    Sodium 10/01/2020 136  136 - 145 mmol/L Final    Potassium 10/01/2020 4.3  3.5 - 5.5 mmol/L Final    Chloride 10/01/2020 101  100 - 111 mmol/L Final    CO2 10/01/2020 29  21 - 32 mmol/L Final    Anion gap 10/01/2020 6  3.0 - 18 mmol/L Final    Glucose 10/01/2020 393* 74 - 99 mg/dL Final    BUN 10/01/2020 14  7.0 - 18 MG/DL Final    Creatinine 10/01/2020 0.91  0.6 - 1.3 MG/DL Final    BUN/Creatinine ratio 10/01/2020 15  12 - 20   Final    GFR est AA 10/01/2020 >60  >60 ml/min/1.73m2 Final    GFR est non-AA 10/01/2020 >60  >60 ml/min/1.73m2 Final    Comment: (NOTE)  Estimated GFR is calculated using the Modification of Diet in Renal   Disease (MDRD) Study equation, reported for both  Americans   (GFRAA) and non- Americans (GFRNA), and normalized to 1.73m2   body surface area. The physician must decide which value applies to   the patient.  The MDRD study equation should only be used in   individuals age 25 or older. It has not been validated for the   following: pregnant women, patients with serious comorbid conditions,   or on certain medications, or persons with extremes of body size,   muscle mass, or nutritional status.  Calcium 10/01/2020 9.6  8.5 - 10.1 MG/DL Final    Bilirubin, total 10/01/2020 0.4  0.2 - 1.0 MG/DL Final    ALT (SGPT) 10/01/2020 35  16 - 61 U/L Final    AST (SGOT) 10/01/2020 16  10 - 38 U/L Final    Alk. phosphatase 10/01/2020 86  45 - 117 U/L Final    Protein, total 10/01/2020 7.1  6.4 - 8.2 g/dL Final    Albumin 10/01/2020 4.2  3.4 - 5.0 g/dL Final    Globulin 10/01/2020 2.9  2.0 - 4.0 g/dL Final    A-G Ratio 10/01/2020 1.4  0.8 - 1.7   Final    Microalbumin,urine random 10/01/2020 0.58  0 - 3.0 MG/DL Final    Creatinine, urine 10/01/2020 36.00  30 - 125 mg/dL Final    Microalbumin/Creat ratio (mg/g cre* 10/01/2020 16  0 - 30 mg/g Final    Hemoglobin A1c 10/01/2020 12.0* 4.2 - 5.6 % Final    Comment: (NOTE)  HbA1C Interpretive Ranges  <5.7              Normal  5.7 - 6.4         Consider Prediabetes  >6.5              Consider Diabetes      Est. average glucose 10/01/2020 298  mg/dL Final    Comment: (NOTE)  The eAG should be interpreted with patient characteristics in mind   since ethnicity, interindividual differences, red cell lifespan,   variation in rates of glycation, etc. may affect the validity of the   calculation.  Prostate Specific Ag 10/01/2020 0.5  0.0 - 4.0 ng/mL Final       .No results found for any visits on 11/11/20. Assessment / Plan:      ICD-10-CM ICD-9-CM    1. Atypical nevi  D22.9 216.9 REFERRAL TO DERMATOLOGY     Referral to Dermatology for evaluation. I asked the patient if he  had any questions and answered his  questions.   The patient stated that he understands the treatment plan and agrees with the treatment plan    This document was created with a voice activated dictation system and may contain transcription errors.

## 2020-12-02 ENCOUNTER — OFFICE VISIT (OUTPATIENT)
Dept: FAMILY MEDICINE CLINIC | Age: 50
End: 2020-12-02
Payer: COMMERCIAL

## 2020-12-02 VITALS
TEMPERATURE: 98 F | HEIGHT: 65 IN | OXYGEN SATURATION: 98 % | WEIGHT: 203 LBS | DIASTOLIC BLOOD PRESSURE: 62 MMHG | BODY MASS INDEX: 33.82 KG/M2 | RESPIRATION RATE: 16 BRPM | SYSTOLIC BLOOD PRESSURE: 139 MMHG | HEART RATE: 82 BPM

## 2020-12-02 DIAGNOSIS — M62.838 MUSCLE SPASM: Primary | ICD-10-CM

## 2020-12-02 DIAGNOSIS — E66.01 SEVERE OBESITY (BMI 35.0-39.9) WITH COMORBIDITY (HCC): ICD-10-CM

## 2020-12-02 DIAGNOSIS — E11.40 TYPE 2 DIABETES MELLITUS WITH DIABETIC NEUROPATHY, WITHOUT LONG-TERM CURRENT USE OF INSULIN (HCC): ICD-10-CM

## 2020-12-02 PROBLEM — G83.4 CAUDA EQUINA COMPRESSION (HCC): Status: RESOLVED | Noted: 2018-02-08 | Resolved: 2020-12-02

## 2020-12-02 PROCEDURE — 99213 OFFICE O/P EST LOW 20 MIN: CPT | Performed by: NURSE PRACTITIONER

## 2020-12-02 RX ORDER — IBUPROFEN 800 MG/1
TABLET ORAL
Qty: 90 TAB | Refills: 0 | Status: SHIPPED | OUTPATIENT
Start: 2020-12-02 | End: 2021-02-15

## 2020-12-02 NOTE — PROGRESS NOTES
Sole Davis is a 53 y.o. male presenting today for Weight Loss (follow-up)  . HPI:  Sole Davis presents to the office today for weight loss follow up. Patient has weight of 203lbs this visit which is the same as last visit on 11/11. Advised patient no weight loss. Patient states that he is not experiencing any rectal bleeding and has a colonoscopy scheduled for next week. Upon ear examination, patient has build up of wax in bilateral ears. Denies any chest pain, shortness of breath, swelling, nausea & vomiting. ROS     Constitutional: No apparent distress noted  General- negative for fever, chills or fatigue  Eyes- negative visual changes  CV- denies chest pain, palpitation  Pul: negative cough or SOB  GI: negative nausea, flank pain, diarrhea, constipation  Urinary:- No dysuria or polyuria  MS- negative myalgia, negative joint pain  Neuro- negative headache, dizziness or weakness  Skin- negative for rashes or lesions. Psych- denies any anxiety or depression      Allergies   Allergen Reactions    Iodine Unknown (comments)    Lipitor [Atorvastatin] Nausea Only       Current Outpatient Medications   Medication Sig Dispense Refill    ibuprofen (MOTRIN) 800 mg tablet TAKE 1 TABLET BY MOUTH EVERY 8 HOURS AS NEEDED FOR PAIN 90 Tab 0    labetaloL (NORMODYNE) 100 mg tablet TAKE 1 TABLET BY MOUTH TWICE DAILY (GENERIC  FOR  NORMODYNE) 180 Tab 1    SITagliptin (JANUVIA) 25 mg tablet Take 1 Tab by mouth daily. 90 Tab 0    metFORMIN ER (GLUCOPHAGE XR) 500 mg tablet Take 1 Tab by mouth daily (with dinner). 30 Tab 1    gabapentin (NEURONTIN) 300 mg capsule Take 1 Cap by mouth two (2) times a day. Max Daily Amount: 600 mg. 60 Cap 2    tiZANidine (ZANAFLEX) 2 mg tablet TAKE 1 TABLET BY MOUTH THREE TIMES DAILY AS NEEDED 90 Tab 1    telmisartan (MICARDIS) 40 mg tablet TAKE 1 TABLET BY MOUTH ONCE DAILY STOP  VALSTARTAN 90 Tab 1    rosuvastatin (CRESTOR) 5 mg tablet Take 1 Tab by mouth nightly.  30 Tab 3  aspirin delayed-release 81 mg tablet Take  by mouth daily.  glyBURIDE (DIABETA) 2.5 mg tablet Take 1 Tab by mouth Daily (before breakfast). 80 Tab 0       Past Medical History:   Diagnosis Date    Acute sinusitis     Back pain     BBB (bundle branch block)     Bursitis disorder     Cardiac cath 07/30/2012    R dom. Patent coronary arteries. LVEDP 15.  RA 10.  RV 32/10. PA 32/18. Mean PASP 22. W 14.  CO 5.7 (TD). High CO & high O2 sats on right raise question of peripheral arterial venous shunting.  Cardiac echocardiogram 06/27/2012    EF 50-55%. Dyssynergic septal motion c/w LBBB. RVSP/PASP 50-55. Mild LAE. Mild-mod TR. Mild IVCE. 30 x 5-mm echodensity in interventricular groove (poss fibrinous deposit). Sm pericardial effus at apex.  Diabetes ([address]     Hx of cardiac cath     Braeden's Daughters at about 12years old.     Hypertension     Neuropathy     bilateral in feet    Obesity     Other ill-defined conditions(799.89)     heart cath 2012    Skin lesion     Umbilical hernia without obstruction or gangrene     Unspecified sleep apnea     no diagnosed       Past Surgical History:   Procedure Laterality Date    HX COLONOSCOPY  09/2017    hx of polyps    HX HEART CATHETERIZATION  07/30/12    HX HEENT      dental extract    HX HERNIA REPAIR  2014    HX LUMBAR LAMINECTOMY  02/08/2018    L4 laminectomy, L5 laminectomy       Social History     Socioeconomic History    Marital status:      Spouse name: Not on file    Number of children: Not on file    Years of education: Not on file    Highest education level: Not on file   Occupational History    Not on file   Social Needs    Financial resource strain: Not on file    Food insecurity     Worry: Not on file     Inability: Not on file    Transportation needs     Medical: Not on file     Non-medical: Not on file   Tobacco Use    Smoking status: Never Smoker    Smokeless tobacco: Never Used   Substance and Sexual Activity    Alcohol use: Yes     Alcohol/week: 0.0 standard drinks     Types: 2 - 3 Standard drinks or equivalent per week     Comment: \"few drinks every evening\"    Drug use: No    Sexual activity: Yes     Partners: Female   Lifestyle    Physical activity     Days per week: Not on file     Minutes per session: Not on file    Stress: Not on file   Relationships    Social connections     Talks on phone: Not on file     Gets together: Not on file     Attends Muslim service: Not on file     Active member of club or organization: Not on file     Attends meetings of clubs or organizations: Not on file     Relationship status: Not on file    Intimate partner violence     Fear of current or ex partner: Not on file     Emotionally abused: Not on file     Physically abused: Not on file     Forced sexual activity: Not on file   Other Topics Concern    Not on file   Social History Narrative    Not on file         Vitals:    12/02/20 0858   BP: 139/62   Pulse: 82   Resp: 16   Temp: 98 °F (36.7 °C)   TempSrc: Oral   SpO2: 98%   Weight: 203 lb (92.1 kg)   Height: 5' 5\" (1.651 m)   PainSc:   0 - No pain       Physical Exam  Constitutional:       Appearance: Normal appearance. He is normal weight. HENT:      Head: Normocephalic and atraumatic. Right Ear: Tympanic membrane normal. There is impacted cerumen. Left Ear: Tympanic membrane normal.      Nose: Nose normal.      Mouth/Throat:      Mouth: Mucous membranes are moist.      Pharynx: Oropharynx is clear. Eyes:      Pupils: Pupils are equal, round, and reactive to light. Neck:      Musculoskeletal: Normal range of motion. Cardiovascular:      Rate and Rhythm: Normal rate and regular rhythm. Pulses: Normal pulses. Abdominal:      General: Bowel sounds are normal.      Palpations: Abdomen is soft. Comments: Large abdomen   Musculoskeletal: Normal range of motion. Skin:     General: Skin is warm and dry.    Neurological:      General: No focal deficit present. Mental Status: He is alert and oriented to person, place, and time. Psychiatric:         Mood and Affect: Mood normal.         Hospital Outpatient Visit on 10/01/2020   Component Date Value Ref Range Status    WBC 10/01/2020 6.6  4.6 - 13.2 K/uL Final    RBC 10/01/2020 5.00  4.70 - 5.50 M/uL Final    HGB 10/01/2020 15.0  13.0 - 16.0 g/dL Final    HCT 10/01/2020 42.2  36.0 - 48.0 % Final    MCV 10/01/2020 84.4  74.0 - 97.0 FL Final    MCH 10/01/2020 30.0  24.0 - 34.0 PG Final    MCHC 10/01/2020 35.5  31.0 - 37.0 g/dL Final    RDW 10/01/2020 12.2  11.6 - 14.5 % Final    PLATELET 03/94/3117 861  135 - 420 K/uL Final    MPV 10/01/2020 11.1  9.2 - 11.8 FL Final    NEUTROPHILS 10/01/2020 59  40 - 73 % Final    LYMPHOCYTES 10/01/2020 28  21 - 52 % Final    MONOCYTES 10/01/2020 9  3 - 10 % Final    EOSINOPHILS 10/01/2020 3  0 - 5 % Final    BASOPHILS 10/01/2020 1  0 - 2 % Final    ABS. NEUTROPHILS 10/01/2020 4.0  1.8 - 8.0 K/UL Final    ABS. LYMPHOCYTES 10/01/2020 1.9  0.9 - 3.6 K/UL Final    ABS. MONOCYTES 10/01/2020 0.6  0.05 - 1.2 K/UL Final    ABS. EOSINOPHILS 10/01/2020 0.2  0.0 - 0.4 K/UL Final    ABS. BASOPHILS 10/01/2020 0.0  0.0 - 0.1 K/UL Final    DF 10/01/2020 AUTOMATED    Final    LIPID PROFILE 10/01/2020        Final    Cholesterol, total 10/01/2020 262* <200 MG/DL Final    Triglyceride 10/01/2020 372* <150 MG/DL Final    Comment: The drugs N-acetylcysteine (NAC) and  Metamiszole have been found to cause falsely  low results in this chemical assay. Please  be sure to submit blood samples obtained  BEFORE administration of either of these  drugs to assure correct results.       HDL Cholesterol 10/01/2020 45  40 - 60 MG/DL Final    LDL, calculated 10/01/2020 142.6* 0 - 100 MG/DL Final    VLDL, calculated 10/01/2020 74.4  MG/DL Final    CHOL/HDL Ratio 10/01/2020 5.8* 0 - 5.0   Final    Sodium 10/01/2020 136  136 - 145 mmol/L Final    Potassium 10/01/2020 4.3  3.5 - 5.5 mmol/L Final    Chloride 10/01/2020 101  100 - 111 mmol/L Final    CO2 10/01/2020 29  21 - 32 mmol/L Final    Anion gap 10/01/2020 6  3.0 - 18 mmol/L Final    Glucose 10/01/2020 393* 74 - 99 mg/dL Final    BUN 10/01/2020 14  7.0 - 18 MG/DL Final    Creatinine 10/01/2020 0.91  0.6 - 1.3 MG/DL Final    BUN/Creatinine ratio 10/01/2020 15  12 - 20   Final    GFR est AA 10/01/2020 >60  >60 ml/min/1.73m2 Final    GFR est non-AA 10/01/2020 >60  >60 ml/min/1.73m2 Final    Comment: (NOTE)  Estimated GFR is calculated using the Modification of Diet in Renal   Disease (MDRD) Study equation, reported for both  Americans   (GFRAA) and non- Americans (GFRNA), and normalized to 1.73m2   body surface area. The physician must decide which value applies to   the patient. The MDRD study equation should only be used in   individuals age 25 or older. It has not been validated for the   following: pregnant women, patients with serious comorbid conditions,   or on certain medications, or persons with extremes of body size,   muscle mass, or nutritional status.  Calcium 10/01/2020 9.6  8.5 - 10.1 MG/DL Final    Bilirubin, total 10/01/2020 0.4  0.2 - 1.0 MG/DL Final    ALT (SGPT) 10/01/2020 35  16 - 61 U/L Final    AST (SGOT) 10/01/2020 16  10 - 38 U/L Final    Alk.  phosphatase 10/01/2020 86  45 - 117 U/L Final    Protein, total 10/01/2020 7.1  6.4 - 8.2 g/dL Final    Albumin 10/01/2020 4.2  3.4 - 5.0 g/dL Final    Globulin 10/01/2020 2.9  2.0 - 4.0 g/dL Final    A-G Ratio 10/01/2020 1.4  0.8 - 1.7   Final    Microalbumin,urine random 10/01/2020 0.58  0 - 3.0 MG/DL Final    Creatinine, urine 10/01/2020 36.00  30 - 125 mg/dL Final    Microalbumin/Creat ratio (mg/g cre* 10/01/2020 16  0 - 30 mg/g Final    Hemoglobin A1c 10/01/2020 12.0* 4.2 - 5.6 % Final    Comment: (NOTE)  HbA1C Interpretive Ranges  <5.7              Normal  5.7 - 6.4         Consider Prediabetes  >6.5 Consider Diabetes      Est. average glucose 10/01/2020 298  mg/dL Final    Comment: (NOTE)  The eAG should be interpreted with patient characteristics in mind   since ethnicity, interindividual differences, red cell lifespan,   variation in rates of glycation, etc. may affect the validity of the   calculation.  Prostate Specific Ag 10/01/2020 0.5  0.0 - 4.0 ng/mL Final       .No results found for any visits on 12/02/20. Assessment / Plan:      ICD-10-CM ICD-9-CM    1. Muscle spasm  M62.838 728. 85 ibuprofen (MOTRIN) 800 mg tablet   2. Type 2 diabetes mellitus with diabetic neuropathy, without long-term current use of insulin (HCC)  E11.40 250.60 HEMOGLOBIN A1C WITH EAG     357.2 LIPID PANEL   3. Severe obesity (BMI 35.0-39. 9) with comorbidity ([facility] [address]  E66.01 278.01        Follow-up and Dispositions    · Return in about 2 months (around 2/2/2021), or if symptoms worsen or fail to improve. I asked the patient if he  had any questions and answered his  questions. The patient stated that he understands the treatment plan and agrees with the treatment plan    This document was created with a voice activated dictation system and may contain transcription errors.

## 2020-12-02 NOTE — PROGRESS NOTES
Williamdarren Radha presents today for   Chief Complaint   Patient presents with    Weight Loss     follow-up       Is someone accompanying this pt? no    Is the patient using any DME equipment during OV? no    Depression Screening:  3 most recent PHQ Screens 12/2/2020   PHQ Not Done -   Little interest or pleasure in doing things Not at all   Feeling down, depressed, irritable, or hopeless Not at all   Total Score PHQ 2 0       Learning Assessment:  Learning Assessment 2/18/2020   PRIMARY LEARNER Patient   HIGHEST LEVEL OF EDUCATION - PRIMARY LEARNER  -   BARRIERS PRIMARY LEARNER -   [address] [place] [address]    NEED -   LEARNER PREFERENCE PRIMARY DEMONSTRATION   ANSWERED BY Patient   RELATIONSHIP SELF       Health Maintenance reviewed and discussed and ordered per Provider. Health Maintenance Due   Topic Date Due    Eye Exam Retinal or Dilated  11/09/1981    DTaP/Tdap/Td series (1 - Tdap) 11/09/1992    Foot Exam Q1  10/04/2020   . Coordination of Care:  1. Have you been to the ER, urgent care clinic since your last visit? Hospitalized since your last visit? no    2. Have you seen or consulted any other health care providers outside of the [address] [place] [address] since your last visit? Include any pap smears or colon screening.  no

## 2021-01-15 DIAGNOSIS — E11.40 TYPE 2 DIABETES MELLITUS WITH DIABETIC NEUROPATHY, WITHOUT LONG-TERM CURRENT USE OF INSULIN (HCC): ICD-10-CM

## 2021-01-15 RX ORDER — GLYBURIDE 2.5 MG/1
TABLET ORAL
Qty: 90 TAB | Refills: 0 | Status: SHIPPED | OUTPATIENT
Start: 2021-01-15 | End: 2021-08-30

## 2021-01-21 ENCOUNTER — TRANSCRIBE ORDER (OUTPATIENT)
Dept: SCHEDULING | Age: 51
End: 2021-01-21

## 2021-01-21 DIAGNOSIS — M54.9 DORSALGIA: ICD-10-CM

## 2021-01-21 DIAGNOSIS — M54.50 LOW BACK PAIN: Primary | ICD-10-CM

## 2021-01-21 DIAGNOSIS — M54.50 LUMBAGO: Primary | ICD-10-CM

## 2021-02-09 ENCOUNTER — HOSPITAL ENCOUNTER (OUTPATIENT)
Age: 51
Discharge: HOME | End: 2021-02-09
Attending: ORTHOPAEDIC SURGERY
Payer: COMMERCIAL

## 2021-02-09 DIAGNOSIS — M54.50 LOW BACK PAIN: ICD-10-CM

## 2021-02-09 DIAGNOSIS — M54.9 DORSALGIA: ICD-10-CM

## 2021-02-09 DIAGNOSIS — M54.50 LUMBAGO: ICD-10-CM

## 2021-02-09 PROCEDURE — 72148 MRI LUMBAR SPINE W/O DYE: CPT

## 2021-02-09 PROCEDURE — 72146 MRI CHEST SPINE W/O DYE: CPT

## 2021-02-15 DIAGNOSIS — M62.838 MUSCLE SPASM: ICD-10-CM

## 2021-02-15 RX ORDER — IBUPROFEN 800 MG/1
TABLET ORAL
Qty: 90 TAB | Refills: 0 | Status: SHIPPED | OUTPATIENT
Start: 2021-02-15 | End: 2021-04-18

## 2021-04-16 DIAGNOSIS — M62.838 MUSCLE SPASM: ICD-10-CM

## 2021-04-18 DIAGNOSIS — E11.40 TYPE 2 DIABETES MELLITUS WITH DIABETIC NEUROPATHY, WITHOUT LONG-TERM CURRENT USE OF INSULIN (HCC): ICD-10-CM

## 2021-04-18 RX ORDER — GABAPENTIN 300 MG/1
300 CAPSULE ORAL 2 TIMES DAILY
Qty: 60 CAP | Refills: 2 | Status: SHIPPED | OUTPATIENT
Start: 2021-04-18 | End: 2021-11-28

## 2021-04-18 RX ORDER — IBUPROFEN 800 MG/1
TABLET ORAL
Qty: 90 TAB | Refills: 0 | Status: SHIPPED | OUTPATIENT
Start: 2021-04-18 | End: 2021-06-24

## 2021-06-21 DIAGNOSIS — M62.838 MUSCLE SPASM: ICD-10-CM

## 2021-06-24 RX ORDER — IBUPROFEN 800 MG/1
TABLET ORAL
Qty: 90 TABLET | Refills: 0 | Status: SHIPPED | OUTPATIENT
Start: 2021-06-24 | End: 2021-09-12

## 2021-08-04 ENCOUNTER — OFFICE VISIT (OUTPATIENT)
Dept: FAMILY MEDICINE CLINIC | Age: 51
End: 2021-08-04
Payer: COMMERCIAL

## 2021-08-04 VITALS
HEART RATE: 91 BPM | OXYGEN SATURATION: 98 % | TEMPERATURE: 98.2 F | DIASTOLIC BLOOD PRESSURE: 81 MMHG | SYSTOLIC BLOOD PRESSURE: 147 MMHG | HEIGHT: 65 IN | RESPIRATION RATE: 16 BRPM | WEIGHT: 216 LBS | BODY MASS INDEX: 35.99 KG/M2

## 2021-08-04 DIAGNOSIS — M54.16 LUMBAR RADICULOPATHY: ICD-10-CM

## 2021-08-04 DIAGNOSIS — I10 ESSENTIAL HYPERTENSION: ICD-10-CM

## 2021-08-04 DIAGNOSIS — Z11.59 ENCOUNTER FOR HEPATITIS C SCREENING TEST FOR LOW RISK PATIENT: ICD-10-CM

## 2021-08-04 DIAGNOSIS — M62.838 MUSCLE SPASM: ICD-10-CM

## 2021-08-04 DIAGNOSIS — E11.40 TYPE 2 DIABETES MELLITUS WITH DIABETIC NEUROPATHY, WITHOUT LONG-TERM CURRENT USE OF INSULIN (HCC): Primary | ICD-10-CM

## 2021-08-04 DIAGNOSIS — E66.01 SEVERE OBESITY (BMI 35.0-35.9 WITH COMORBIDITY) (HCC): ICD-10-CM

## 2021-08-04 LAB — HBA1C MFR BLD HPLC: 10.2 %

## 2021-08-04 PROCEDURE — 99214 OFFICE O/P EST MOD 30 MIN: CPT | Performed by: NURSE PRACTITIONER

## 2021-08-04 PROCEDURE — 83036 HEMOGLOBIN GLYCOSYLATED A1C: CPT | Performed by: NURSE PRACTITIONER

## 2021-08-04 RX ORDER — TIZANIDINE 2 MG/1
TABLET ORAL
Qty: 90 TABLET | Refills: 1 | Status: SHIPPED | OUTPATIENT
Start: 2021-08-04 | End: 2022-04-04

## 2021-08-04 NOTE — PROGRESS NOTES
Denisse Vega presents today for   Chief Complaint   Patient presents with    Form Completion     FMLA     Diabetes     follow-up       Is someone accompanying this pt? no    Is the patient using any DME equipment during OV? no    Depression Screening:  3 most recent PHQ Screens 8/4/2021   PHQ Not Done -   Little interest or pleasure in doing things Not at all   Feeling down, depressed, irritable, or hopeless Not at all   Total Score PHQ 2 0       Learning Assessment:  Learning Assessment 2/18/2020   PRIMARY LEARNER Patient   HIGHEST LEVEL OF EDUCATION - PRIMARY LEARNER  -   BARRIERS PRIMARY LEARNER -   [address] [place] [address]    NEED -   LEARNER PREFERENCE PRIMARY DEMONSTRATION   ANSWERED BY Patient   RELATIONSHIP SELF       Health Maintenance reviewed and discussed and ordered per Provider. Health Maintenance Due   Topic Date Due    Hepatitis C Screening  Never done    DTaP/Tdap/Td series (1 - Tdap) Never done    Foot Exam Q1  10/04/2020    A1C test (Diabetic or Prediabetic)  01/01/2021   . Coordination of Care:  1. Have you been to the ER, urgent care clinic since your last visit? Hospitalized since your last visit? no    2. Have you seen or consulted any other health care providers outside of the [address] [place] [address] since your last visit? Include any pap smears or colon screening.  no

## 2021-08-04 NOTE — PROGRESS NOTES
Terry Toro is a 53 y.o. male presenting today for Form Completion (FMLA ) and Diabetes (follow-up)  . Chief Complaint   Patient presents with    Form Completion     FMLA     Diabetes     follow-up       HPI:  Terry Toro presents to the office today for diabetes, hyperlipidemia and hypertension follow-up care. Patient also a diagnosis for chronic lumbar back pain. Patient is also here to complete intermittent FMLA paperwork associated with his chronic lumbar pain. He previously had lumbar surgery and notes he continues to have intermittent lumbar pain that prevents him from working. The pain is more severe in the a.m and he has difficulty getting out of the bed. Patient notes he was recently seen by Dr. Kwadwo Ramsay and reports he was told he had chronic arthritis and will require intermittent steroid injections. ROS  ROS:  History obtained from the patient intake forms which are reviewed with the patient  · General: negative for - chills, fever, weight changes or malaise  · HEENT: no sore throat, nasal congestion, vision problems or ear problems  · Respiratory: no cough, shortness of breath, or wheezing  · Cardiovascular: no chest pain, palpitations, or dyspnea on exertion  · Gastrointestinal: no abdominal pain, N/V, change in bowel habits, or black or bloody stools  · Musculoskeletal: no back pain, joint pain, joint stiffness, muscle pain or muscle weakness  · Neurological: no numbness, tingling, headache or dizziness  · Endo:  No polyuria or polydipsia. · : no hematuria, dysuria, frequency, hesitancy, or nocturia.     · Psychological: negative for - anxiety, depression, sleep disturbances, suicidal or homicidal ideations    Allergies   Allergen Reactions    Iodine Unknown (comments)    Lipitor [Atorvastatin] Nausea Only       PHQ Screening   3 most recent PHQ Screens 8/4/2021   PHQ Not Done -   Little interest or pleasure in doing things Not at all   Feeling down, depressed, irritable, or hopeless Not at all   Total Score PHQ 2 0       History  Past Medical History:   Diagnosis Date    Acute sinusitis     Back pain     BBB (bundle branch block)     Bursitis disorder     Cardiac cath 07/30/2012    R dom. Patent coronary arteries. LVEDP 15.  RA 10.  RV 32/10. PA 32/18. Mean PASP 22. W 14.  CO 5.7 (TD). High CO & high O2 sats on right raise question of peripheral arterial venous shunting.  Cardiac echocardiogram 06/27/2012    EF 50-55%. Dyssynergic septal motion c/w LBBB. RVSP/PASP 50-55. Mild LAE. Mild-mod TR. Mild IVCE. 30 x 5-mm echodensity in interventricular groove (poss fibrinous deposit). Sm pericardial effus at apex.  Diabetes ([address]     Hx of cardiac cath     Braeden's Daughters at about 12years old.  Hypertension     Neuropathy     bilateral in feet    Obesity     Other ill-defined conditions(799.89)     heart cath 2012    Skin lesion     Umbilical hernia without obstruction or gangrene     Unspecified sleep apnea     no diagnosed       Past Surgical History:   Procedure Laterality Date    HX COLONOSCOPY  09/2017    hx of polyps    HX HEART CATHETERIZATION  07/30/12    HX HEENT      dental extract    HX HERNIA REPAIR  2014    HX LUMBAR LAMINECTOMY  02/08/2018    L4 laminectomy, L5 laminectomy       Social History     Socioeconomic History    Marital status:      Spouse name: Not on file    Number of children: Not on file    Years of education: Not on file    Highest education level: Not on file   Occupational History    Not on file   Tobacco Use    Smoking status: Never Smoker    Smokeless tobacco: Never Used   Substance and Sexual Activity    Alcohol use:  Yes     Alcohol/week: 0.0 standard drinks     Types: 2 - 3 Standard drinks or equivalent per week     Comment: \"few drinks every evening\"    Drug use: No    Sexual activity: Yes     Partners: Female   Other Topics Concern    Not on file   Social History Narrative    Not on file Social Determinants of Health     Financial Resource Strain:     Difficulty of Paying Living Expenses:    Food Insecurity:     Worried About Running Out of Food in the Last Year:     [address] in the Last Year:    Transportation Needs:     Lack of Transportation (Medical):  Lack of Transportation (Non-Medical):    Physical Activity:     Days of Exercise per Week:     Minutes of Exercise per Session:    Stress:     Feeling of Stress :    Social Connections:     Frequency of Communication with Friends and Family:     Frequency of Social Gatherings with Friends and Family:     Attends Religion Services:     Active Member of Clubs or Organizations:     Attends Club or Organization Meetings:     Marital Status:    Intimate Partner Violence:     Fear of Current or Ex-Partner:     Emotionally Abused:     Physically Abused:     Sexually Abused:        Current Outpatient Medications   Medication Sig Dispense Refill    tiZANidine (ZANAFLEX) 2 mg tablet TAKE 1 TABLET BY MOUTH THREE TIMES DAILY AS NEEDED 90 Tablet 1    SITagliptin-metFORMIN (JANUMET) 50-1,000 mg per tablet Take 1 Tablet by mouth two (2) times daily (with meals). 60 Tablet 1    telmisartan (MICARDIS) 40 mg tablet TAKE 1 TABLET BY MOUTH ONCE DAILY. STOP VALSARTAN 30 Tablet 0    ibuprofen (MOTRIN) 800 mg tablet TAKE 1 TABLET BY MOUTH EVERY 8 HOURS AS NEEDED FOR PAIN 90 Tablet 0    gabapentin (NEURONTIN) 300 mg capsule Take 1 Cap by mouth two (2) times a day. Max Daily Amount: 600 mg. 60 Cap 2    glyBURIDE (DIABETA) 2.5 mg tablet TAKE 1 TABLET BY MOUTH ONCE DAILY BEFORE BREAKFAST 90 Tab 0    labetaloL (NORMODYNE) 100 mg tablet TAKE 1 TABLET BY MOUTH TWICE DAILY (GENERIC  FOR  NORMODYNE) 180 Tab 1    rosuvastatin (CRESTOR) 5 mg tablet Take 1 Tab by mouth nightly. 30 Tab 3    aspirin delayed-release 81 mg tablet Take  by mouth daily.            Vitals:    08/04/21 0847   BP: (!) 147/81   Pulse: 91   Resp: 16   Temp: 98.2 °F (36.8 °C)   TempSrc: Oral   SpO2: 98%   Weight: 216 lb (98 kg)   Height: 5' 5\" (1.651 m)   PainSc:   1       Physical Exam  Vitals and nursing note reviewed. Constitutional:       Appearance: Normal appearance. Cardiovascular:      Rate and Rhythm: Normal rate and regular rhythm. Pulses: Normal pulses. Heart sounds: Normal heart sounds. Pulmonary:      Effort: Pulmonary effort is normal.      Breath sounds: Normal breath sounds. Musculoskeletal:      Cervical back: Neck supple. Skin:     General: Skin is warm and dry. Neurological:      Mental Status: He is alert. Office Visit on 08/04/2021   Component Date Value Ref Range Status    Hemoglobin A1c (POC) 08/04/2021 10.2  % Final       Results for orders placed or performed in visit on 08/04/21   AMB POC HEMOGLOBIN A1C   Result Value Ref Range    Hemoglobin A1c (POC) 10.2 %       Patient Care Team:  Patient Care Team:  Kianna Antonio NP as PCP - General (Nurse Practitioner)  Kianna Antonio NP as PCP - [address] [place] [address]  Empaneled Provider  Marianela Colvin MD (Cardiology)      Assessment / Plan:      ICD-10-CM ICD-9-CM    1. Type 2 diabetes mellitus with diabetic neuropathy, without long-term current use of insulin (HCC)  E11.40 250.60 AMB POC HEMOGLOBIN A1C     357.2 SITagliptin-metFORMIN (JANUMET) 50-1,000 mg per tablet   2. Muscle spasm  M62.838 728.85 tiZANidine (ZANAFLEX) 2 mg tablet   3. Encounter for hepatitis C screening test for low risk patient  Z11.59 V73.89 HEPATITIS C AB   4. Essential hypertension  I10 401.9 LIPID PANEL      METABOLIC PANEL, COMPREHENSIVE      CBC WITH AUTOMATED DIFF   5. Lumbar radiculopathy  M54.16 724.4    6. Severe obesity (BMI 35.0-35.9 with comorbidity) (HCC)  E66.01 278.01     Z68.35 V85.35      Fasting labs ordered  Medication refills  Hep C  Hypertension-elevated, will reevaluate in 4 to 6 weeks  Diabetes-hemoglobin A1c elevated 10.2. Patient knows he will work on lifestyle modification. Follow-up in 4 weeks    Follow-up and Dispositions    · Return in about 4 weeks (around 9/1/2021). I asked the patient if he  had any questions and answered his  questions. The patient stated that he understands the treatment plan and agrees with the treatment plan    This document was created with a voice activated dictation system and may contain transcription errors.

## 2021-08-04 NOTE — LETTER
NOTIFICATION RETURN TO WORK / SCHOOL    8/4/2021 10:27 AM    . [place] [facility] [address]      To Whom It May Concern: Lu Thompson is currently under the care of Su Moran. He will return to work/school on: 8/5/2021    If there are questions or concerns please have the patient contact our office.         Sincerely,      Mireya Dumas NP  Please except electronic signature

## 2021-08-17 ENCOUNTER — TELEPHONE (OUTPATIENT)
Dept: FAMILY MEDICINE CLINIC | Age: 51
End: 2021-08-17

## 2021-08-19 ENCOUNTER — TELEPHONE (OUTPATIENT)
Dept: FAMILY MEDICINE CLINIC | Age: 51
End: 2021-08-19

## 2021-09-12 DIAGNOSIS — M62.838 MUSCLE SPASM: ICD-10-CM

## 2021-09-12 RX ORDER — IBUPROFEN 800 MG/1
TABLET ORAL
Qty: 90 TABLET | Refills: 0 | Status: SHIPPED | OUTPATIENT
Start: 2021-09-12 | End: 2021-11-28

## 2021-09-13 DIAGNOSIS — I10 ESSENTIAL HYPERTENSION: ICD-10-CM

## 2021-09-14 RX ORDER — LABETALOL 100 MG/1
TABLET, FILM COATED ORAL
Qty: 180 TABLET | Refills: 0 | Status: SHIPPED | OUTPATIENT
Start: 2021-09-14 | End: 2022-02-25

## 2021-11-18 ENCOUNTER — OFFICE VISIT (OUTPATIENT)
Dept: CARDIOLOGY CLINIC | Age: 51
End: 2021-11-18
Payer: COMMERCIAL

## 2021-11-18 VITALS
WEIGHT: 214 LBS | BODY MASS INDEX: 35.65 KG/M2 | DIASTOLIC BLOOD PRESSURE: 62 MMHG | OXYGEN SATURATION: 99 % | HEIGHT: 65 IN | HEART RATE: 92 BPM | SYSTOLIC BLOOD PRESSURE: 130 MMHG

## 2021-11-18 DIAGNOSIS — E11.40 TYPE 2 DIABETES MELLITUS WITH DIABETIC NEUROPATHY, WITHOUT LONG-TERM CURRENT USE OF INSULIN (HCC): ICD-10-CM

## 2021-11-18 DIAGNOSIS — E78.5 DYSLIPIDEMIA: ICD-10-CM

## 2021-11-18 DIAGNOSIS — R00.2 PALPITATIONS: Primary | ICD-10-CM

## 2021-11-18 DIAGNOSIS — I44.7 LBBB (LEFT BUNDLE BRANCH BLOCK): ICD-10-CM

## 2021-11-18 DIAGNOSIS — E66.01 SEVERE OBESITY (BMI 35.0-39.9) WITH COMORBIDITY (HCC): ICD-10-CM

## 2021-11-18 PROCEDURE — 99214 OFFICE O/P EST MOD 30 MIN: CPT | Performed by: INTERNAL MEDICINE

## 2021-11-18 PROCEDURE — 93000 ELECTROCARDIOGRAM COMPLETE: CPT | Performed by: INTERNAL MEDICINE

## 2021-11-18 NOTE — PROGRESS NOTES
Gege Salguero presents today for No chief complaint on file. Gege Salguero preferred language for health care discussion is english/other. Is someone accompanying this pt? no    Is the patient using any DME equipment during [address]? no    Depression Screening:  3 most recent PHQ Screens 8/4/2021   PHQ Not Done -   Little interest or pleasure in doing things Not at all   Feeling down, depressed, irritable, or hopeless Not at all   Total Score PHQ 2 0       Learning Assessment:  Learning Assessment 2/18/2020   PRIMARY LEARNER Patient   HIGHEST LEVEL OF EDUCATION - PRIMARY LEARNER  -   BARRIERS PRIMARY LEARNER -   [facility]    NEED -   LEARNER PREFERENCE PRIMARY DEMONSTRATION   ANSWERED BY Patient   RELATIONSHIP SELF       Abuse Screening:  Abuse Screening Questionnaire 10/13/2020   Do you ever feel afraid of your partner? N   Are you in a relationship with someone who physically or mentally threatens you? N   Is it safe for you to go home? Y       Fall Risk  Fall Risk Assessment, last 12 mths 2/18/2020   Able to walk? Yes   Fall in past 12 months? No       Pt currently taking Anticoagulant therapy? no    Coordination of Care:  1. Have you been to the ER, urgent care clinic since your last visit? Hospitalized since your last visit? no    2. Have you seen or consulted any other health care providers outside of the [address] [place] [address] since your last visit? Include any pap smears or colon screening.  no

## 2021-11-18 NOTE — PROGRESS NOTES
HISTORY OF PRESENT ILLNESS  Ainsley Harrington is a 49 y.o. male. Palpitations   Pertinent negatives include no fever, no malaise/fatigue, no chest pain, no claudication, no orthopnea, no PND, no abdominal pain, no nausea, no vomiting, no headaches, no back pain, no dizziness, no cough and no shortness of breath. Follow-up  Pertinent negatives include no chest pain, no abdominal pain, no headaches and no shortness of breath. Patient presents for an overdue follow-up office visit. He has a past medical history significant for a known chronic left bundle branch block. He also has a history of questionable pulmonary hypertension based on echocardiogram in the past, so he underwent a cardiac catheterization back in 2012 which showed normal coronary anatomy, with upper normal PA pressures and no evidence of intracardiac shunting. Patient underwent follow-up noninvasive cardiac testing in January 2017. He underwent an echocardiogram which showed a low normal EF of 50-55% with mild concentric LVH and moderate pulmonary hypertension. His pharmacologic nuclear stress test was negative for ischemia. His echocardiogram was essentially unchanged compared to his previous study from 2012. Patient was last seen in the office over a year and a half ago. At last visit, he was prescribed a low-dose of Crestor 5 mg daily, however he states he may taking this for a few days and then stopped it on his own. He returns today stating he had an episode of recurrent heart palpitations 2 to 3 months ago which would last for several days on and off and then would resolve. He felt the sensation that his heart was beating fast in his chest.  The episodes would last for 60 to 90 minutes before subsiding. He states he would take his labetalol dose and the symptoms would improve. He admits he may not be taking his labetalol regularly as prescribed.     Past Medical History:   Diagnosis Date    Acute sinusitis     Back pain  BBB (bundle branch block)     Bursitis disorder     Cardiac cath 07/30/2012    R dom. Patent coronary arteries. LVEDP 15.  RA 10.  RV 32/10. PA 32/18. Mean PASP 22. W 14.  CO 5.7 (TD). High CO & high O2 sats on right raise question of peripheral arterial venous shunting.  Cardiac echocardiogram 06/27/2012    EF 50-55%. Dyssynergic septal motion c/w LBBB. RVSP/PASP 50-55. Mild LAE. Mild-mod TR. Mild IVCE. 30 x 5-mm echodensity in interventricular groove (poss fibrinous deposit). Sm pericardial effus at apex.  Diabetes ([address]     Hx of cardiac cath     Braeden's Daughters at about 12years old.  Hypertension     Neuropathy     bilateral in feet    Obesity     Other ill-defined conditions(799.89)     heart cath 2012    Skin lesion     Umbilical hernia without obstruction or gangrene     Unspecified sleep apnea     no diagnosed     Current Outpatient Medications   Medication Sig Dispense Refill    labetaloL (NORMODYNE) 100 mg tablet TAKE 1 TABLET BY MOUTH TWICE DAILY (GENERIC FOR NORMODYNE) 180 Tablet 0    ibuprofen (MOTRIN) 800 mg tablet TAKE 1 TABLET BY MOUTH EVERY 8 HOURS AS NEEDED FOR PAIN 90 Tablet 0    telmisartan (MICARDIS) 40 mg tablet TAKE 1 TABLET BY MOUTH ONCE DAILY STOP  VALSARTAN 30 Tablet 3    glyBURIDE (DIABETA) 2.5 mg tablet TAKE 1 TABLET BY MOUTH ONCE DAILY BEFORE BREAKFAST 90 Tablet 1    tiZANidine (ZANAFLEX) 2 mg tablet TAKE 1 TABLET BY MOUTH THREE TIMES DAILY AS NEEDED 90 Tablet 1    SITagliptin-metFORMIN (JANUMET) 50-1,000 mg per tablet Take 1 Tablet by mouth two (2) times daily (with meals). 60 Tablet 1    gabapentin (NEURONTIN) 300 mg capsule Take 1 Cap by mouth two (2) times a day. Max Daily Amount: 600 mg. 60 Cap 2    rosuvastatin (CRESTOR) 5 mg tablet Take 1 Tab by mouth nightly. 30 Tab 3    aspirin delayed-release 81 mg tablet Take  by mouth daily.        Allergies   Allergen Reactions    Iodine Unknown (comments)    Lipitor [Atorvastatin] Nausea Only Social History     Tobacco Use    Smoking status: Never Smoker    Smokeless tobacco: Never Used   Substance Use Topics    Alcohol use: Yes     Alcohol/week: 0.0 standard drinks     Types: 2 - 3 Standard drinks or equivalent per week     Comment: \"few drinks every evening\"    Drug use: No     Family History   Problem Relation Age of Onset    Diabetes Mother     Cancer Mother     Cancer Other         lung; grandfather-nos         Review of Systems   Constitutional: Negative for chills, fever, malaise/fatigue and weight loss. HENT: Negative for nosebleeds. Eyes: Negative for blurred vision and double vision. Respiratory: Negative for cough, shortness of breath and wheezing. Cardiovascular: Positive for palpitations. Negative for chest pain, orthopnea, claudication, leg swelling and PND. Gastrointestinal: Negative for abdominal pain, heartburn, nausea and vomiting. Genitourinary: Negative for dysuria and hematuria. Musculoskeletal: Negative for back pain, falls, joint pain and myalgias. Skin: Negative for rash. Neurological: Positive for sensory change. Negative for dizziness, focal weakness and headaches. Endo/Heme/Allergies: Does not bruise/bleed easily. Psychiatric/Behavioral: Negative for substance abuse. Visit Vitals  /62 (BP 1 Location: Left upper arm, BP Patient Position: Sitting, BP Cuff Size: Adult)   Pulse 92   Ht 5' 5\" (1.651 m)   Wt 97.1 kg (214 lb)   SpO2 99%   BMI 35.61 kg/m²       Physical Exam  Constitutional:       Appearance: He is well-developed. HENT:      Head: Normocephalic and atraumatic. Eyes:      Conjunctiva/sclera: Conjunctivae normal.   Neck:      Vascular: No carotid bruit or JVD. Cardiovascular:      Rate and Rhythm: Normal rate and regular rhythm. Pulses: Normal pulses. Heart sounds: S1 normal and S2 normal. No murmur heard. No gallop. No S3 sounds. Pulmonary:      Breath sounds: Normal breath sounds. No wheezing or rales. Abdominal:      General: Bowel sounds are normal.      Palpations: Abdomen is soft. Tenderness: There is no abdominal tenderness. Musculoskeletal:         General: No swelling, tenderness or deformity. Cervical back: Neck supple. Skin:     General: Skin is warm and dry. Neurological:      Mental Status: He is alert and oriented to person, place, and time. Psychiatric:         Behavior: Behavior normal.         Thought Content: Thought content normal.       EKG:  Normal sinus rhythm, left axis deviation, atypical left bundle branch block, no change compared to his previous EKG. ASSESSMENT and PLAN    Heart palpitations. Patient symptoms tend to come and go. My suspicion is that he may be experiencing sinus tachycardia from not taking his labetalol regularly. I did advise him to call our office if his symptoms become more pronounced or regular and would mail him out a 14 or 30-day event monitor. Essential hypertension. Patient blood pressure appears to be well-controlled on his current medical regimen. Chronic left bundle branch block. Unchanged on EKG. patient underwent a cardiac catheterization in 2012 which did not show any significant coronary disease. He then underwent a pharmacologic nuclear stress test in January 2017 which did not show any ischemia. Diabetes mellitus, type II. This is been managed with oral agents. Historically this has been poorly controlled. His most recent hemoglobin A1c several months ago was 10.2. He now has evidence for peripheral neuropathy as well. Dyslipidemia. Patient never started taking his Crestor regularly. A lipid panel in October 2020 on new medications: Total cholesterol 262, triglycerides 372, HDL 45, . He states he still has this medication at home, so would like him to take Crestor 5 mg daily for at least a month to see if he can tolerate this medication. Follow-up annually, sooner if needed.

## 2022-02-23 DIAGNOSIS — E11.40 TYPE 2 DIABETES MELLITUS WITH DIABETIC NEUROPATHY, WITHOUT LONG-TERM CURRENT USE OF INSULIN (HCC): ICD-10-CM

## 2022-02-23 DIAGNOSIS — I10 ESSENTIAL HYPERTENSION: ICD-10-CM

## 2022-02-25 RX ORDER — LABETALOL 100 MG/1
TABLET, FILM COATED ORAL
Qty: 180 TABLET | Refills: 0 | Status: SHIPPED | OUTPATIENT
Start: 2022-02-25 | End: 2022-08-16 | Stop reason: SDUPTHER

## 2022-02-25 RX ORDER — GLYBURIDE 2.5 MG/1
TABLET ORAL
Qty: 90 TABLET | Refills: 0 | Status: SHIPPED | OUTPATIENT
Start: 2022-02-25 | End: 2022-05-27

## 2022-03-19 PROBLEM — Z98.890 S/P LUMBAR LAMINECTOMY: Status: ACTIVE | Noted: 2018-02-22

## 2022-03-19 PROBLEM — M54.16 LUMBAR RADICULOPATHY: Status: ACTIVE | Noted: 2018-02-02

## 2022-03-19 PROBLEM — E11.9 CONTROLLED TYPE 2 DIABETES MELLITUS WITHOUT COMPLICATION, WITHOUT LONG-TERM CURRENT USE OF INSULIN (HCC): Status: ACTIVE | Noted: 2018-09-19

## 2022-03-19 PROBLEM — E78.5 DYSLIPIDEMIA: Status: ACTIVE | Noted: 2020-02-18

## 2022-03-19 PROBLEM — M54.16 LUMBAR NEURITIS: Status: ACTIVE | Noted: 2018-01-03

## 2022-03-19 PROBLEM — E88.2 EPIDURAL LIPOMATOSIS: Status: ACTIVE | Noted: 2018-02-02

## 2022-03-19 PROBLEM — E66.9 OBESITY (BMI 35.0-39.9 WITHOUT COMORBIDITY): Status: ACTIVE | Noted: 2018-09-19

## 2022-03-19 PROBLEM — E66.01 SEVERE OBESITY (BMI 35.0-39.9) WITH COMORBIDITY (HCC): Status: ACTIVE | Noted: 2018-03-23

## 2022-03-19 PROBLEM — E11.40 TYPE 2 DIABETES MELLITUS WITH DIABETIC NEUROPATHY (HCC): Status: ACTIVE | Noted: 2019-10-04

## 2022-03-19 PROBLEM — D17.79 EPIDURAL LIPOMATOSIS: Status: ACTIVE | Noted: 2018-02-02

## 2022-03-19 PROBLEM — M48.062 SPINAL STENOSIS, LUMBAR REGION WITH NEUROGENIC CLAUDICATION: Status: ACTIVE | Noted: 2018-01-03

## 2022-03-20 PROBLEM — M51.369 DDD (DEGENERATIVE DISC DISEASE), LUMBAR: Status: ACTIVE | Noted: 2018-01-03

## 2022-03-20 PROBLEM — M51.36 DDD (DEGENERATIVE DISC DISEASE), LUMBAR: Status: ACTIVE | Noted: 2018-01-03

## 2022-03-20 PROBLEM — G89.29 CHRONIC BILATERAL LOW BACK PAIN WITHOUT SCIATICA: Status: ACTIVE | Noted: 2018-08-21

## 2022-03-20 PROBLEM — M54.50 CHRONIC BILATERAL LOW BACK PAIN WITHOUT SCIATICA: Status: ACTIVE | Noted: 2018-08-21

## 2022-03-31 DIAGNOSIS — M62.838 MUSCLE SPASM: ICD-10-CM

## 2022-04-04 RX ORDER — TIZANIDINE 2 MG/1
TABLET ORAL
Qty: 90 TABLET | Refills: 0 | Status: SHIPPED | OUTPATIENT
Start: 2022-04-04 | End: 2022-07-14 | Stop reason: SDUPTHER

## 2022-04-04 RX ORDER — IBUPROFEN 800 MG/1
TABLET ORAL
Qty: 90 TABLET | Refills: 0 | Status: SHIPPED | OUTPATIENT
Start: 2022-04-04 | End: 2022-07-14 | Stop reason: SDUPTHER

## 2022-05-21 DIAGNOSIS — E11.40 TYPE 2 DIABETES MELLITUS WITH DIABETIC NEUROPATHY, WITHOUT LONG-TERM CURRENT USE OF INSULIN (HCC): ICD-10-CM

## 2022-05-27 RX ORDER — GLYBURIDE 2.5 MG/1
TABLET ORAL
Qty: 90 TABLET | Refills: 0 | Status: SHIPPED | OUTPATIENT
Start: 2022-05-27

## 2022-05-27 RX ORDER — TELMISARTAN 40 MG/1
TABLET ORAL
Qty: 30 TABLET | Refills: 0 | Status: SHIPPED | OUTPATIENT
Start: 2022-05-27 | End: 2022-06-28

## 2022-06-28 RX ORDER — TELMISARTAN 40 MG/1
TABLET ORAL
Qty: 30 TABLET | Refills: 0 | Status: SHIPPED | OUTPATIENT
Start: 2022-06-28 | End: 2022-07-26

## 2022-07-07 DIAGNOSIS — M62.838 MUSCLE SPASM: ICD-10-CM

## 2022-07-12 NOTE — TELEPHONE ENCOUNTER
Requested Prescriptions     Pending Prescriptions Disp Refills    tiZANidine (ZANAFLEX) 2 mg tablet 90 Tablet 0    ibuprofen (MOTRIN) 800 mg tablet 90 Tablet 0     Sig: Take 1 Tablet by mouth every eight (8) hours as needed for Pain.      completely out

## 2022-07-14 DIAGNOSIS — M62.838 MUSCLE SPASM: ICD-10-CM

## 2022-07-18 RX ORDER — IBUPROFEN 800 MG/1
800 TABLET ORAL
Qty: 90 TABLET | Refills: 0 | OUTPATIENT
Start: 2022-07-18

## 2022-07-18 RX ORDER — IBUPROFEN 800 MG/1
800 TABLET ORAL
Qty: 30 TABLET | Refills: 0 | Status: SHIPPED | OUTPATIENT
Start: 2022-07-18 | End: 2022-09-21

## 2022-07-18 RX ORDER — TIZANIDINE 2 MG/1
2 TABLET ORAL
Qty: 90 TABLET | Refills: 0 | Status: SHIPPED | OUTPATIENT
Start: 2022-07-18 | End: 2022-10-12 | Stop reason: SDUPTHER

## 2022-07-18 RX ORDER — TIZANIDINE 2 MG/1
TABLET ORAL
Qty: 90 TABLET | Refills: 0 | OUTPATIENT
Start: 2022-07-18

## 2022-07-26 RX ORDER — TELMISARTAN 40 MG/1
TABLET ORAL
Qty: 30 TABLET | Refills: 0 | Status: SHIPPED | OUTPATIENT
Start: 2022-07-26 | End: 2022-08-18

## 2022-09-21 DIAGNOSIS — M62.838 MUSCLE SPASM: ICD-10-CM

## 2022-09-21 RX ORDER — IBUPROFEN 800 MG/1
TABLET ORAL
Qty: 30 TABLET | Refills: 0 | Status: SHIPPED | OUTPATIENT
Start: 2022-09-21

## 2022-10-12 ENCOUNTER — OFFICE VISIT (OUTPATIENT)
Dept: FAMILY MEDICINE CLINIC | Age: 52
End: 2022-10-12

## 2022-10-12 VITALS
SYSTOLIC BLOOD PRESSURE: 134 MMHG | OXYGEN SATURATION: 97 % | TEMPERATURE: 97.8 F | HEART RATE: 70 BPM | WEIGHT: 202 LBS | RESPIRATION RATE: 16 BRPM | HEIGHT: 65 IN | DIASTOLIC BLOOD PRESSURE: 69 MMHG | BODY MASS INDEX: 33.66 KG/M2

## 2022-10-12 DIAGNOSIS — M62.838 MUSCLE SPASM: ICD-10-CM

## 2022-10-12 DIAGNOSIS — I10 ESSENTIAL HYPERTENSION: Primary | ICD-10-CM

## 2022-10-12 DIAGNOSIS — Z00.00 ENCOUNTER FOR MEDICAL EXAMINATION TO ESTABLISH CARE: ICD-10-CM

## 2022-10-12 DIAGNOSIS — E11.40 TYPE 2 DIABETES MELLITUS WITH DIABETIC NEUROPATHY, WITHOUT LONG-TERM CURRENT USE OF INSULIN (HCC): ICD-10-CM

## 2022-10-12 DIAGNOSIS — E78.5 DYSLIPIDEMIA: ICD-10-CM

## 2022-10-12 DIAGNOSIS — G62.9 NEUROPATHY: ICD-10-CM

## 2022-10-12 PROCEDURE — 99214 OFFICE O/P EST MOD 30 MIN: CPT | Performed by: STUDENT IN AN ORGANIZED HEALTH CARE EDUCATION/TRAINING PROGRAM

## 2022-10-12 RX ORDER — TIZANIDINE 2 MG/1
2 TABLET ORAL
Qty: 90 TABLET | Refills: 0 | Status: SHIPPED | OUTPATIENT
Start: 2022-10-12

## 2022-10-12 NOTE — PROGRESS NOTES
Rivera Hall is a 49 y.o. male presenting today for Establish Care  . Chief Complaint   Patient presents with    Establish Care       HPI:  Rivera Hall presents to the office today to establish care. Patient has a past medical history of HTN, HLD, T2DM, pulmonary hypertension, chronic LBBB    HTN: Taking Micardis and labetalol. BP is controlled. HLD: Patient never took the Crestor regularly. He was advised to start taking Crestor 5 mg daily by his cardiologist. He stopped taking it because states it made him feel ill and nauseous. No myalgias. T2DM: Currently on glyburide. He stopped takng Janumet - made him feel sluggish. He has peripheral neuropathy and was previously prescribed Neurontin but stopped taking it. Last HbA1c was 10.2%. He has not lab work since 2020. Today, he is complaining of back pain, numbness in bilateral feet. Review of Systems   Constitutional:  Negative for chills, diaphoresis, fever, malaise/fatigue and weight loss. HENT:  Negative for congestion, ear discharge, ear pain, hearing loss, nosebleeds, sinus pain, sore throat and tinnitus. Eyes:  Negative for blurred vision, double vision and photophobia. Respiratory:  Negative for cough, sputum production, shortness of breath, wheezing and stridor. Cardiovascular:  Negative for chest pain, palpitations, orthopnea, claudication and leg swelling. Gastrointestinal:  Negative for abdominal pain, constipation, diarrhea, heartburn, nausea and vomiting. Genitourinary:  Negative for dysuria, flank pain, frequency, hematuria and urgency. Musculoskeletal:  Positive for back pain, joint pain and myalgias. Negative for neck pain. Skin:  Negative for rash. Neurological:  Positive for tingling and sensory change. Negative for tremors, speech change, focal weakness, seizures, weakness and headaches. Psychiatric/Behavioral:  Negative for depression. The patient is not nervous/anxious.     All other systems reviewed and are negative. Allergies   Allergen Reactions    Iodine Unknown (comments)    Lipitor [Atorvastatin] Nausea Only       PHQ Screening   3 most recent PHQ Screens 10/12/2022   PHQ Not Done -   Little interest or pleasure in doing things Not at all   Feeling down, depressed, irritable, or hopeless Not at all   Total Score PHQ 2 0       History  Past Medical History:   Diagnosis Date    Acute sinusitis     Back pain     BBB (bundle branch block)     Bursitis disorder     Cardiac cath 07/30/2012    R dom. Patent coronary arteries. LVEDP 15.  RA 10.  RV 32/10. PA 32/18. Mean PASP 22. W 14.  CO 5.7 (TD). High CO & high O2 sats on right raise question of peripheral arterial venous shunting. Cardiac echocardiogram 06/27/2012    EF 50-55%. Dyssynergic septal motion c/w LBBB. RVSP/PASP 50-55. Mild LAE. Mild-mod TR. Mild IVCE. 30 x 5-mm echodensity in interventricular groove (poss fibrinous deposit). Sm pericardial effus at apex. Diabetes ([address]     Hx of cardiac cath     Braeden's Daughters at about 12years old. Hypertension     Neuropathy     bilateral in feet    Obesity     Other ill-defined conditions(799.89)     heart cath 2012    Skin lesion     Umbilical hernia without obstruction or gangrene     Unspecified sleep apnea     no diagnosed       Past Surgical History:   Procedure Laterality Date    HX COLONOSCOPY  09/2017    hx of polyps    HX HEART CATHETERIZATION  07/30/12    HX HEENT      dental extract    HX HERNIA REPAIR  2014    HX LUMBAR LAMINECTOMY  02/08/2018    L4 laminectomy, L5 laminectomy       Social History     Socioeconomic History    Marital status:      Spouse name: Not on file    Number of children: Not on file    Years of education: Not on file    Highest education level: Not on file   Occupational History    Not on file   Tobacco Use    Smoking status: Never    Smokeless tobacco: Never   Substance and Sexual Activity    Alcohol use:  Yes     Alcohol/week: 0.0 standard drinks     Types: 2 - 3 Standard drinks or equivalent per week     Comment: \"few drinks every evening\"    Drug use: No    Sexual activity: Yes     Partners: Female   Other Topics Concern    Not on file   Social History Narrative    Not on file     Social Determinants of Health     Financial Resource Strain: Low Risk     Difficulty of Paying Living Expenses: Not hard at all   Food Insecurity: No Food Insecurity    Worried About Running Out of Food in the Last Year: Never true    Ran Out of Food in the Last Year: Never true   Transportation Needs: Not on file   Physical Activity: Not on file   Stress: Not on file   Social Connections: Not on file   Intimate Partner Violence: Not on file   Housing Stability: Not on file       Current Outpatient Medications   Medication Sig Dispense Refill    tiZANidine (ZANAFLEX) 2 mg tablet Take 1 Tablet by mouth three (3) times daily as needed for Muscle Spasm(s). 90 Tablet 0    ibuprofen (MOTRIN) 800 mg tablet TAKE 1 TABLET BY MOUTH EVERY 8 HOURS AS NEEDED FOR PAIN 30 Tablet 0    telmisartan (MICARDIS) 40 mg tablet TAKE 1 TABLET BY MOUTH ONCE DAILY (STOP  VALSARTAN) 30 Tablet 2    labetaloL (NORMODYNE) 100 mg tablet TAKE 1 TABLET BY MOUTH TWICE DAILY (GENERIC  FOR  NORMODYNE) 180 Tablet 1    glyBURIDE (DIABETA) 2.5 mg tablet TAKE 1 TABLET BY MOUTH ONCE DAILY BEFORE BREAKFAST 90 Tablet 0    aspirin delayed-release 81 mg tablet Take  by mouth daily. gabapentin (NEURONTIN) 300 mg capsule TAKE 1 CAPSULE BY MOUTH TWICE DAILY. MAX DAILY AMOUNT: 600 MG (Patient not taking: Reported on 10/12/2022) 60 Capsule 3    SITagliptin-metFORMIN (JANUMET) 50-1,000 mg per tablet Take 1 Tablet by mouth two (2) times daily (with meals). (Patient not taking: Reported on 10/12/2022) 60 Tablet 1    rosuvastatin (CRESTOR) 5 mg tablet Take 1 Tab by mouth nightly.  (Patient not taking: Reported on 10/12/2022) 30 Tab 3         Vitals:    10/12/22 1634   BP: 134/69   Pulse: 70   Resp: 16   Temp: 97.8 °F (36.6 °C)   TempSrc: Oral   SpO2: 97%   Weight: 202 lb (91.6 kg)   Height: 5' 5\" (1.651 m)   PainSc:   4   PainLoc: Back       Physical Exam  Vitals and nursing note reviewed. Constitutional:       General: He is not in acute distress. Appearance: Normal appearance. He is normal weight. He is not ill-appearing, toxic-appearing or diaphoretic. HENT:      Head: Normocephalic and atraumatic. Eyes:      General: No scleral icterus. Cardiovascular:      Rate and Rhythm: Normal rate and regular rhythm. Pulses: Normal pulses. Heart sounds: Normal heart sounds. No murmur heard. No gallop. Pulmonary:      Effort: Pulmonary effort is normal. No respiratory distress. Breath sounds: Normal breath sounds. No wheezing or rales. Musculoskeletal:         General: No swelling or tenderness. Normal range of motion. Cervical back: Normal range of motion and neck supple. Right lower leg: No edema. Left lower leg: No edema. Skin:     General: Skin is warm and dry. Coloration: Skin is not jaundiced or pale. Neurological:      General: No focal deficit present. Mental Status: He is alert and oriented to person, place, and time. Mental status is at baseline. Cranial Nerves: No cranial nerve deficit. Motor: No weakness. Gait: Gait normal.   Psychiatric:         Mood and Affect: Mood normal.         Behavior: Behavior normal.         Thought Content: Thought content normal.         Judgment: Judgment normal.       No visits with results within 3 Month(s) from this visit. Latest known visit with results is:   Office Visit on 08/04/2021   Component Date Value Ref Range Status    Hemoglobin A1c (POC) 08/04/2021 10.2  % Final       No results found for any visits on 10/12/22.     Patient Care Team:  Patient Care Team:  Dory King MD as PCP - General (Internal Medicine Physician)  La Spicer MD (Cardiovascular Disease Physician)      Assessment / Plan:      ICD-10-CM ICD-9-CM    1. Essential hypertension  I10 401.9 CBC WITH AUTOMATED DIFF      METABOLIC PANEL, COMPREHENSIVE      2. Muscle spasm  M62.838 728.85 tiZANidine (ZANAFLEX) 2 mg tablet      3. Type 2 diabetes mellitus with diabetic neuropathy, without long-term current use of insulin (HCC)  E11.40 250.60 HEMOGLOBIN A1C WITH EAG     357.2 MICROALBUMIN, UR, RAND W/ MICROALB/CREAT RATIO      4. Dyslipidemia  E78.5 272.4 LIPID PANEL      5. Neuropathy  G62.9 355.9       6. Encounter for medical examination to establish care  Z00.00 V70.9         HTN: BP is controlled on Micardis and labetalol. T2DM: Patient is only taking glyburide. Previously has been uncontrolled. Check repeat HbA1c. Dyslipidemia: Check repeat lipid panel. Patient is no longer taking Crestor due to side effects. Neuropathy: No longer taking gabapentin due to how it makes him feel. Labs ordered. Refused flu shot. Follow-up and Dispositions    Return in about 3 months (around 1/12/2023). I asked the patient if he  had any questions and answered his  questions. The patient stated that he understands the treatment plan and agrees with the treatment plan    This document was created with a voice activated dictation system and may contain transcription errors.

## 2022-10-12 NOTE — PROGRESS NOTES
Kalpesh Cantor presents today for   Chief Complaint   Patient presents with    Establish Care       Vitals:    10/12/22 1634   BP: 134/69   Pulse: 70   Resp: 16   Temp: 97.8 °F (36.6 °C)   TempSrc: Oral   SpO2: 97%   Weight: 202 lb (91.6 kg)   Height: 5' 5\" (1.651 m)        Kalpesh Cantor preferred language for health care discussion is english/other. Is someone accompanying this pt? no    Is the patient using any DME equipment during [address]? no    Depression Screening:  3 most recent PHQ Screens 10/12/2022   PHQ Not Done -   Little interest or pleasure in doing things Not at all   Feeling down, depressed, irritable, or hopeless Not at all   Total Score PHQ 2 0       Learning Assessment:  Learning Assessment 2/18/2020   PRIMARY LEARNER Patient   HIGHEST LEVEL OF EDUCATION - PRIMARY LEARNER  -   BARRIERS PRIMARY LEARNER -   [facility]    NEED -   LEARNER PREFERENCE PRIMARY DEMONSTRATION   ANSWERED BY Patient   RELATIONSHIP SELF       Abuse Screening:  Abuse Screening Questionnaire 10/13/2020   Do you ever feel afraid of your partner? N   Are you in a relationship with someone who physically or mentally threatens you? N   Is it safe for you to go home? Y       Generalized Anxiety  No flowsheet data found. Health Maintenance Due   Topic Date Due    Hepatitis C Screening  Never done    COVID-19 Vaccine (1) Never done    Hepatitis B Vaccine (1 of 3 - Risk 3-dose series) Never done    DTaP/Tdap/Td series (1 - Tdap) Never done    Foot Exam Q1  10/04/2020    MICROALBUMIN Q1  10/01/2021    Lipid Screen  10/01/2021    Shingrix Vaccine Age 50> (1 of 2) Never done    Flu Vaccine (1) Never done    A1C test (Diabetic or Prediabetic)  08/04/2022   . Health Maintenance reviewed and discussed and ordered per Provider. VACCINES DUE   SCREENINGS DUE       Kalpesh Cantor is updated on all HM    1. \"Have you been to the ER, urgent care clinic since your last visit? Hospitalized since your last visit? \" No    2. \"Have you seen or consulted any other health care providers outside of the [address] [place] [address] since your last visit? \" No     3. For patients aged 39-70: Has the patient had a colonoscopy / FIT/ Cologuard? Yes - no Care Gap present     If the patient is female:    4. For patients aged 41-77: Has the patient had a mammogram within the past 2 years? NA - based on age    11. For patients aged 21-65: Has the patient had a pap smear?  NA - based on age

## 2022-11-17 ENCOUNTER — OFFICE VISIT (OUTPATIENT)
Dept: CARDIOLOGY CLINIC | Age: 52
End: 2022-11-17

## 2022-11-17 VITALS
WEIGHT: 205 LBS | HEIGHT: 65 IN | OXYGEN SATURATION: 98 % | SYSTOLIC BLOOD PRESSURE: 130 MMHG | DIASTOLIC BLOOD PRESSURE: 76 MMHG | BODY MASS INDEX: 34.16 KG/M2 | HEART RATE: 77 BPM

## 2022-11-17 DIAGNOSIS — E11.40 TYPE 2 DIABETES MELLITUS WITH DIABETIC NEUROPATHY, WITHOUT LONG-TERM CURRENT USE OF INSULIN (HCC): ICD-10-CM

## 2022-11-17 DIAGNOSIS — I44.7 LBBB (LEFT BUNDLE BRANCH BLOCK): Primary | ICD-10-CM

## 2022-11-17 DIAGNOSIS — R00.2 PALPITATIONS: ICD-10-CM

## 2022-11-17 DIAGNOSIS — I10 ESSENTIAL HYPERTENSION: ICD-10-CM

## 2022-11-17 DIAGNOSIS — E78.5 DYSLIPIDEMIA: ICD-10-CM

## 2022-11-17 PROCEDURE — 99214 OFFICE O/P EST MOD 30 MIN: CPT | Performed by: INTERNAL MEDICINE

## 2022-11-17 PROCEDURE — 93000 ELECTROCARDIOGRAM COMPLETE: CPT | Performed by: INTERNAL MEDICINE

## 2022-11-17 PROCEDURE — 3074F SYST BP LT 130 MM HG: CPT | Performed by: INTERNAL MEDICINE

## 2022-11-17 PROCEDURE — 3078F DIAST BP <80 MM HG: CPT | Performed by: INTERNAL MEDICINE

## 2022-11-17 RX ORDER — ROSUVASTATIN CALCIUM 5 MG/1
5 TABLET, COATED ORAL
Qty: 90 TABLET | Refills: 3 | Status: SHIPPED | OUTPATIENT
Start: 2022-11-17

## 2022-11-17 NOTE — PROGRESS NOTES
Steven Zacarias presents today for   Chief Complaint   Patient presents with    Follow-up     1 year       Steven Zacarias preferred language for health care discussion is english/other. Is someone accompanying this pt? no    Is the patient using any DME equipment during [address]? no    Depression Screening:  3 most recent PHQ Screens 11/17/2022   PHQ Not Done -   Little interest or pleasure in doing things Not at all   Feeling down, depressed, irritable, or hopeless Not at all   Total Score PHQ 2 0       Learning Assessment:  Learning Assessment 11/17/2022   PRIMARY LEARNER Patient   HIGHEST LEVEL OF EDUCATION - PRIMARY LEARNER  -   BARRIERS PRIMARY LEARNER -   CO-LEARNER CAREGIVER -   PRIMARY LANGUAGE ENGLISH    NEED -   LEARNER PREFERENCE PRIMARY DEMONSTRATION   ANSWERED BY pateint   RELATIONSHIP SELF       Abuse Screening:  Abuse Screening Questionnaire 11/17/2022   Do you ever feel afraid of your partner? N   Are you in a relationship with someone who physically or mentally threatens you? N   Is it safe for you to go home? Y       Fall Risk  Fall Risk Assessment, last 12 mths 2/18/2020   Able to walk? Yes   Fall in past 12 months? No           Pt currently taking Anticoagulant therapy? no    Pt currently taking Antiplatelet therapy ? Aspirin  81 mg daily       Coordination of Care:  1. Have you been to the ER, urgent care clinic since your last visit? Hospitalized since your last visit? n    2. Have you seen or consulted any other health care providers outside of the [address] [place] [address] since your last visit? Include any pap smears or colon screening.  luciana

## 2022-11-17 NOTE — TELEPHONE ENCOUNTER
Requested Prescriptions     Pending Prescriptions Disp Refills    glyBURIDE (DIABETA) 2.5 mg tablet 90 Tablet 0     Sig: Take 1 Tablet by mouth Daily (before breakfast).

## 2022-11-17 NOTE — PROGRESS NOTES
HISTORY OF PRESENT ILLNESS  Arturo Hernandez is a 47 y.o. male. Follow-up  Pertinent negatives include no chest pain, no abdominal pain, no headaches and no shortness of breath. Palpitations   Pertinent negatives include no fever, no malaise/fatigue, no chest pain, no claudication, no orthopnea, no PND, no abdominal pain, no nausea, no vomiting, no headaches, no back pain, no dizziness, no cough and no shortness of breath. Patient presents for an overdue follow-up office visit. He has a past medical history significant for a known chronic left bundle branch block. He also has a history of questionable pulmonary hypertension based on echocardiogram in the past, so he underwent a cardiac catheterization back in 2012 which showed normal coronary anatomy, with upper normal PA pressures and no evidence of intracardiac shunting. Patient underwent follow-up noninvasive cardiac testing in January 2017. He underwent an echocardiogram which showed a low normal EF of 50-55% with mild concentric LVH and moderate pulmonary hypertension. His pharmacologic nuclear stress test was negative for ischemia. His echocardiogram was essentially unchanged compared to his previous study from 2012. Patient was last seen in the office approximately 1 year ago. At last visit, he was supposed to restart Crestor 5 mg daily. However he states he did not have any of this at home and never got a prescription. He states that he had to quit his job due to chronic low back pain and as result lost his insurance. He denies any new chest pain, shortness of breath or leg swelling. He still complains of rare heart palpitations but only lasting a few seconds at most in duration. No associated dizziness, diaphoresis or syncope. Past Medical History:   Diagnosis Date    Acute sinusitis     Back pain     BBB (bundle branch block)     Bursitis disorder     Cardiac cath 07/30/2012    R dom. Patent coronary arteries. LVEDP 15.  RA 10. RV 32/10. PA 32/18. Mean PASP 22. W 14.  CO 5.7 (TD). High CO & high O2 sats on right raise question of peripheral arterial venous shunting. Cardiac echocardiogram 06/27/2012    EF 50-55%. Dyssynergic septal motion c/w LBBB. RVSP/PASP 50-55. Mild LAE. Mild-mod TR. Mild IVCE. 30 x 5-mm echodensity in interventricular groove (poss fibrinous deposit). Sm pericardial effus at apex. Diabetes ([address]     Hx of cardiac cath     Braeden's Daughters at about 12years old. Hypertension     Neuropathy     bilateral in feet    Obesity     Other ill-defined conditions(799.89)     heart cath 2012    Skin lesion     Umbilical hernia without obstruction or gangrene     Unspecified sleep apnea     no diagnosed     Current Outpatient Medications   Medication Sig Dispense Refill    rosuvastatin (CRESTOR) 5 mg tablet Take 1 Tablet by mouth nightly. 90 Tablet 3    telmisartan (MICARDIS) 40 mg tablet TAKE 1 TABLET BY MOUTH ONCE DAILY ( STOP VALSARTAN ) 30 Tablet 2    tiZANidine (ZANAFLEX) 2 mg tablet Take 1 Tablet by mouth three (3) times daily as needed for Muscle Spasm(s). 90 Tablet 0    ibuprofen (MOTRIN) 800 mg tablet TAKE 1 TABLET BY MOUTH EVERY 8 HOURS AS NEEDED FOR PAIN 30 Tablet 0    labetaloL (NORMODYNE) 100 mg tablet TAKE 1 TABLET BY MOUTH TWICE DAILY (GENERIC  FOR  NORMODYNE) 180 Tablet 1    glyBURIDE (DIABETA) 2.5 mg tablet TAKE 1 TABLET BY MOUTH ONCE DAILY BEFORE BREAKFAST 90 Tablet 0    aspirin delayed-release 81 mg tablet Take  by mouth daily. Allergies   Allergen Reactions    Iodine Unknown (comments)    Lipitor [Atorvastatin] Nausea Only      Social History     Tobacco Use    Smoking status: Never    Smokeless tobacco: Never   Vaping Use    Vaping Use: Never used   Substance Use Topics    Alcohol use:  Yes     Alcohol/week: 0.0 standard drinks     Types: 2 - 3 Standard drinks or equivalent per week     Comment: \"few drinks every evening\"    Drug use: No     Family History   Problem Relation Age of Onset    Diabetes Mother     Cancer Mother     Cancer Other         lung; grandfather-nos         Review of Systems   Constitutional:  Negative for chills, fever, malaise/fatigue and weight loss. HENT:  Negative for nosebleeds. Eyes:  Negative for blurred vision and double vision. Respiratory:  Negative for cough, shortness of breath and wheezing. Cardiovascular:  Positive for palpitations. Negative for chest pain, orthopnea, claudication, leg swelling and PND. Gastrointestinal:  Negative for abdominal pain, heartburn, nausea and vomiting. Genitourinary:  Negative for dysuria and hematuria. Musculoskeletal:  Negative for back pain, falls, joint pain and myalgias. Skin:  Negative for rash. Neurological:  Negative for dizziness, sensory change, focal weakness and headaches. Endo/Heme/Allergies:  Does not bruise/bleed easily. Psychiatric/Behavioral:  Negative for substance abuse. Visit Vitals  /76 (BP 1 Location: Left upper arm, BP Patient Position: Sitting, BP Cuff Size: Adult)   Pulse 77   Ht 5' 5\" (1.651 m)   Wt 93 kg (205 lb)   SpO2 98%   BMI 34.11 kg/m²       Physical Exam  Constitutional:       Appearance: He is well-developed. HENT:      Head: Normocephalic and atraumatic. Eyes:      Conjunctiva/sclera: Conjunctivae normal.   Neck:      Vascular: No carotid bruit or JVD. Cardiovascular:      Rate and Rhythm: Normal rate and regular rhythm. Pulses: Normal pulses. Heart sounds: S1 normal and S2 normal. No murmur heard. No gallop. No S3 sounds. Pulmonary:      Breath sounds: Normal breath sounds. No wheezing or rales. Abdominal:      General: Bowel sounds are normal.      Palpations: Abdomen is soft. Tenderness: There is no abdominal tenderness. Musculoskeletal:         General: No swelling, tenderness or deformity. Cervical back: Neck supple. Skin:     General: Skin is warm and dry.    Neurological:      Mental Status: He is alert and oriented to person, place, and time. Psychiatric:         Behavior: Behavior normal.         Thought Content: Thought content normal.     EKG:  Normal sinus rhythm, left axis deviation, atypical left bundle branch block, no change compared to his previous EKG. ASSESSMENT and PLAN    Heart palpitations. No significant change in frequency or severity since last visit. He remains on a beta-blocker. I still suspect he may be having either PVCs or PACs. No additional work-up needed. Essential hypertension. Patient blood pressure appears to be well-controlled on his current medical regimen. All of which I would continue. Chronic left bundle branch block. Unchanged on EKG. patient underwent a cardiac catheterization in 2012 which did not show any significant coronary disease. He then underwent a pharmacologic nuclear stress test in January 2017 which did not show any ischemia. Diabetes mellitus, type II. This is been managed with oral agents. Historically this has been poorly controlled. He now has evidence for peripheral neuropathy as well. Dyslipidemia. Patient never started taking his Crestor regularly. I have strongly advised him to start taking at least a low-dose statin given his elevated cholesterol numbers and his chronic diabetes. A prescription for Crestor 5 mg daily was sent to his pharmacy. Follow-up annually, sooner if needed.

## 2022-11-18 RX ORDER — GLYBURIDE 2.5 MG/1
2.5 TABLET ORAL
Qty: 90 TABLET | Refills: 0 | Status: SHIPPED | OUTPATIENT
Start: 2022-11-18

## 2022-12-08 DIAGNOSIS — M62.838 MUSCLE SPASM: ICD-10-CM

## 2022-12-09 RX ORDER — IBUPROFEN 800 MG/1
TABLET ORAL
Qty: 30 TABLET | Refills: 0 | OUTPATIENT
Start: 2022-12-09

## 2022-12-09 NOTE — TELEPHONE ENCOUNTER
Patient needs to get his labs done and kidney function checked before any further refills. Please schedule him in for a lab appointment.

## 2023-01-01 NOTE — LETTER
NOTIFICATION RETURN TO WORK / SCHOOL 
 
7/23/2019 9:48 AM 
 
Mr. Yoni Shukla [address] [address] To Whom It May Concern: Yoni Shukla is currently under the care of Su Moran. He will return to work/school on: 7/24/2019 If there are questions or concerns please have the patient contact our office.  
 
 
 
Sincerely, 
 
 
 
 
Mildred Gurrola NP 
 
                                
 
 0

## 2023-02-08 ENCOUNTER — OFFICE VISIT (OUTPATIENT)
Dept: FAMILY MEDICINE CLINIC | Age: 53
End: 2023-02-08

## 2023-02-08 VITALS
HEART RATE: 76 BPM | SYSTOLIC BLOOD PRESSURE: 122 MMHG | HEIGHT: 65 IN | WEIGHT: 202 LBS | RESPIRATION RATE: 14 BRPM | BODY MASS INDEX: 33.66 KG/M2 | OXYGEN SATURATION: 98 % | DIASTOLIC BLOOD PRESSURE: 79 MMHG

## 2023-02-08 DIAGNOSIS — K59.00 CONSTIPATION, UNSPECIFIED CONSTIPATION TYPE: Primary | ICD-10-CM

## 2023-02-08 DIAGNOSIS — Z11.59 ENCOUNTER FOR HEPATITIS C SCREENING TEST FOR LOW RISK PATIENT: ICD-10-CM

## 2023-02-08 DIAGNOSIS — G62.9 NEUROPATHY: ICD-10-CM

## 2023-02-08 DIAGNOSIS — E11.40 TYPE 2 DIABETES MELLITUS WITH DIABETIC NEUROPATHY, WITHOUT LONG-TERM CURRENT USE OF INSULIN (HCC): ICD-10-CM

## 2023-02-08 DIAGNOSIS — R10.32 LEFT LOWER QUADRANT ABDOMINAL PAIN: ICD-10-CM

## 2023-02-08 DIAGNOSIS — R20.2 NUMBNESS AND TINGLING OF RIGHT ARM: ICD-10-CM

## 2023-02-08 DIAGNOSIS — R20.0 NUMBNESS AND TINGLING OF RIGHT ARM: ICD-10-CM

## 2023-02-08 DIAGNOSIS — E78.5 DYSLIPIDEMIA: ICD-10-CM

## 2023-02-08 DIAGNOSIS — I10 ESSENTIAL HYPERTENSION: ICD-10-CM

## 2023-02-08 LAB — HBA1C MFR BLD HPLC: 6.2 %

## 2023-02-08 PROCEDURE — 3044F HG A1C LEVEL LT 7.0%: CPT | Performed by: STUDENT IN AN ORGANIZED HEALTH CARE EDUCATION/TRAINING PROGRAM

## 2023-02-08 PROCEDURE — 3078F DIAST BP <80 MM HG: CPT | Performed by: STUDENT IN AN ORGANIZED HEALTH CARE EDUCATION/TRAINING PROGRAM

## 2023-02-08 PROCEDURE — 83036 HEMOGLOBIN GLYCOSYLATED A1C: CPT | Performed by: STUDENT IN AN ORGANIZED HEALTH CARE EDUCATION/TRAINING PROGRAM

## 2023-02-08 PROCEDURE — 99214 OFFICE O/P EST MOD 30 MIN: CPT | Performed by: STUDENT IN AN ORGANIZED HEALTH CARE EDUCATION/TRAINING PROGRAM

## 2023-02-08 PROCEDURE — 3074F SYST BP LT 130 MM HG: CPT | Performed by: STUDENT IN AN ORGANIZED HEALTH CARE EDUCATION/TRAINING PROGRAM

## 2023-02-08 RX ORDER — FACIAL-BODY WIPES
10 EACH TOPICAL
Qty: 5 EACH | Refills: 0 | Status: SHIPPED | OUTPATIENT
Start: 2023-02-08

## 2023-02-08 RX ORDER — GLYBURIDE 2.5 MG/1
2.5 TABLET ORAL
Qty: 90 TABLET | Refills: 1 | Status: SHIPPED | OUTPATIENT
Start: 2023-02-08

## 2023-02-08 NOTE — PROGRESS NOTES
Daily Browning is a 47 y.o. male presenting today for Abdominal Pain (Was seen 2-6-23 at [facility] for abdominal pain LLQ. CT scan, blood and urine was done. Was advised nothing was found. Was given a Rx (bentyl and miralax) but not filled yet. States that he has not had a bowel movement in days. Still has the pain today. Has had nausea following eating, and pain would start after eating at times. ) and Numbness (When he lays on his left side for an extended time, his whole right side goes numb from the head down, feels pressure in his head, has brain fog and it takes him a little bit of time to get readjusted. Did have scan done while at the hospital but states he was advised there was nothing shown. )  . Chief Complaint   Patient presents with    Abdominal Pain     Was seen 2-6-23 at [facility] for abdominal pain LLQ. CT scan, blood and urine was done. Was advised nothing was found. Was given a Rx (bentyl and miralax) but not filled yet. States that he has not had a bowel movement in days. Still has the pain today. Has had nausea following eating, and pain would start after eating at times. Numbness     When he lays on his left side for an extended time, his whole right side goes numb from the head down, feels pressure in his head, has brain fog and it takes him a little bit of time to get readjusted. Did have scan done while at the hospital but states he was advised there was nothing shown. HPI:  Daily Browning presents to the office today for follow up. Patient has a past medical history of HTN, HLD, T2DM, pulmonary hypertension, chronic LBBB. Patient presented to the ED on 2/6/2023 with abdominal pain. CT AP showed cholelithiasis but no acute findings. He was also complaining of a headache so CT head was done which was negative. Labs were normal aside from a slightly elevated WBC count at 11.2 with neutrophilia. He was prescribed Bentyl and MiraLAX. He has not started taking it yet.   He has a history of constipation documented in prior visits to GI. Has had intermittent rectal bleeding in the past due to hard stools. Patient reports no recent bleeding. States that his last BM was on Thursday. He continues to have intermittent abdominal pain at his LLQ. Had 1 episode of nausea. No vomiting. No fever or chills. He has started eating less due to the pain. Continues to pass gas. Patient is also complaining of intermittent right-sided numbness and tingling. CT head in ED was negative. This is a chronic complaint since many years per patient. HTN: Taking Micardis and labetalol. BP is controlled. HLD: Currently on Crestor 5 mg daily. T2DM: Currently on glyburide. He stopped takng Janumet - made him feel sluggish. He has peripheral neuropathy and was previously prescribed Neurontin but stopped taking it. Last HbA1c was 10.2%. He has cut down on sugary snacks and drinks. Chronic LBBB: Following with cardiology. He underwent a cardiac catheterization 2012 which did not show any significant coronary disease. Nuclear stress stressed in 2017 did not show any ischemia. Today, he is complaining of back pain, numbness in bilateral feet. He has a history of spinal stenosis. Was previously following with neurosurgery but has not followed up. Review of Systems   Constitutional:  Negative for chills, diaphoresis, fever, malaise/fatigue and weight loss. HENT:  Negative for congestion, ear discharge, ear pain, hearing loss, nosebleeds, sinus pain, sore throat and tinnitus. Eyes:  Negative for blurred vision, double vision and photophobia. Respiratory:  Negative for cough, sputum production, shortness of breath, wheezing and stridor. Cardiovascular:  Negative for chest pain, palpitations, orthopnea, claudication and leg swelling. Gastrointestinal:  Negative for abdominal pain, constipation, diarrhea, heartburn, nausea and vomiting.    Genitourinary:  Negative for dysuria, flank pain, frequency, hematuria and urgency. Musculoskeletal:  Positive for back pain, joint pain and myalgias. Negative for neck pain. Skin:  Negative for rash. Neurological:  Positive for tingling and sensory change. Negative for tremors, speech change, focal weakness, seizures, weakness and headaches. Psychiatric/Behavioral:  Negative for depression. The patient is not nervous/anxious. All other systems reviewed and are negative. Allergies   Allergen Reactions    Iodine Unknown (comments)    Lipitor [Atorvastatin] Nausea Only       PHQ Screening   3 most recent PHQ Screens 11/17/2022   PHQ Not Done -   Little interest or pleasure in doing things Not at all   Feeling down, depressed, irritable, or hopeless Not at all   Total Score PHQ 2 0       History  Past Medical History:   Diagnosis Date    Acute sinusitis     Back pain     BBB (bundle branch block)     Bursitis disorder     Cardiac cath 07/30/2012    R dom. Patent coronary arteries. LVEDP 15.  RA 10.  RV 32/10. PA 32/18. Mean PASP 22. W 14.  CO 5.7 (TD). High CO & high O2 sats on right raise question of peripheral arterial venous shunting. Cardiac echocardiogram 06/27/2012    EF 50-55%. Dyssynergic septal motion c/w LBBB. RVSP/PASP 50-55. Mild LAE. Mild-mod TR. Mild IVCE. 30 x 5-mm echodensity in interventricular groove (poss fibrinous deposit). Sm pericardial effus at apex. Diabetes ([address]     Hx of cardiac cath     Braeden's Daughters at about 12years old.     Hypertension     Neuropathy     bilateral in feet    Obesity     Other ill-defined conditions(799.89)     heart cath 2012    Skin lesion     Umbilical hernia without obstruction or gangrene     Unspecified sleep apnea     no diagnosed       Past Surgical History:   Procedure Laterality Date    HX COLONOSCOPY  09/2017    hx of polyps    HX HEART CATHETERIZATION  07/30/12    HX HEENT      dental extract    HX HERNIA REPAIR  2014    HX LUMBAR LAMINECTOMY  02/08/2018 L4 laminectomy, L5 laminectomy       Social History     Socioeconomic History    Marital status:      Spouse name: Not on file    Number of children: Not on file    Years of education: Not on file    Highest education level: Not on file   Occupational History    Not on file   Tobacco Use    Smoking status: Never    Smokeless tobacco: Never   Vaping Use    Vaping Use: Never used   Substance and Sexual Activity    Alcohol use: Yes     Alcohol/week: 0.0 standard drinks     Types: 2 - 3 Standard drinks or equivalent per week     Comment: \"few drinks every evening\"    Drug use: No    Sexual activity: Yes     Partners: Female   Other Topics Concern    Not on file   Social History Narrative    Not on file     Social Determinants of Health     Financial Resource Strain: Low Risk     Difficulty of Paying Living Expenses: Not hard at all   Food Insecurity: No Food Insecurity    Worried About Running Out of Food in the Last Year: Never true    Ran Out of Food in the Last Year: Never true   Transportation Needs: Not on file   Physical Activity: Not on file   Stress: Not on file   Social Connections: Not on file   Intimate Partner Violence: Not on file   Housing Stability: Not on file       Current Outpatient Medications   Medication Sig Dispense Refill    glyBURIDE (DIABETA) 2.5 mg tablet Take 1 Tablet by mouth Daily (before breakfast). 90 Tablet 1    bisacodyL (DULCOLAX) 10 mg supp Insert 10 mg into rectum daily as needed for Constipation. Indications: constipation 5 Each 0    psyllium (METAMUCIL) pack (sugar free) packet Take 1 Packet by mouth daily. 30 Packet 2    telmisartan (MICARDIS) 40 mg tablet TAKE 1 TABLET BY MOUTH ONCE DAILY 30 Tablet 2    rosuvastatin (CRESTOR) 5 mg tablet Take 1 Tablet by mouth nightly. 90 Tablet 3    tiZANidine (ZANAFLEX) 2 mg tablet Take 1 Tablet by mouth three (3) times daily as needed for Muscle Spasm(s).  90 Tablet 0    labetaloL (NORMODYNE) 100 mg tablet TAKE 1 TABLET BY MOUTH TWICE DAILY (GENERIC  FOR  NORMODYNE) 180 Tablet 1    aspirin delayed-release 81 mg tablet Take  by mouth daily. Vitals:    02/08/23 0914   BP: 122/79   Pulse: 76   Resp: 14   SpO2: 98%   Weight: 202 lb (91.6 kg)   Height: 5' 5\" (1.651 m)   PainSc:   2   PainLoc: Abdomen       Physical Exam  Vitals and nursing note reviewed. Constitutional:       General: He is not in acute distress. Appearance: Normal appearance. He is normal weight. He is not ill-appearing, toxic-appearing or diaphoretic. HENT:      Head: Normocephalic and atraumatic. Eyes:      General: No scleral icterus. Cardiovascular:      Rate and Rhythm: Normal rate and regular rhythm. Pulses: Normal pulses. Heart sounds: Normal heart sounds. No murmur heard. No gallop. Pulmonary:      Effort: Pulmonary effort is normal. No respiratory distress. Breath sounds: Normal breath sounds. No wheezing or rales. Abdominal:      General: Bowel sounds are normal. There is distension. Palpations: Abdomen is soft. Tenderness: There is no guarding. Hernia: No hernia is present. Comments: Tenderness at LLQ   Musculoskeletal:         General: No swelling or tenderness. Normal range of motion. Cervical back: Normal range of motion and neck supple. Right lower leg: No edema. Left lower leg: No edema. Skin:     General: Skin is warm and dry. Coloration: Skin is not jaundiced or pale. Neurological:      General: No focal deficit present. Mental Status: He is alert and oriented to person, place, and time. Mental status is at baseline. Cranial Nerves: No cranial nerve deficit. Motor: No weakness. Gait: Gait normal.   Psychiatric:         Mood and Affect: Mood normal.         Behavior: Behavior normal.         Thought Content:  Thought content normal.         Judgment: Judgment normal.       Office Visit on 02/08/2023   Component Date Value Ref Range Status    Hemoglobin A1c (POC) 02/08/2023 6.2  % Final       Results for orders placed or performed in visit on 02/08/23   AMB POC HEMOGLOBIN A1C   Result Value Ref Range    Hemoglobin A1c (POC) 6.2 %       Patient Care Team:  Patient Care Team:  Eliseo Mack MD as PCP - General (Internal Medicine Physician)  Eliseo Mack MD as PCP [facility]  Aura Israel MD (Cardiovascular Disease Physician)      Assessment / Plan:      ICD-10-CM ICD-9-CM    1. Constipation, unspecified constipation type  K59.00 564.00 bisacodyL (DULCOLAX) 10 mg supp      psyllium (METAMUCIL) pack (sugar free) packet      2. Type 2 diabetes mellitus with diabetic neuropathy, without long-term current use of insulin (HCC)  E11.40 250.60 glyBURIDE (DIABETA) 2.5 mg tablet     357.2 AMB POC HEMOGLOBIN A1C      3. Numbness and tingling of right arm  R20.0 782.0 VITAMIN B12 & FOLATE    R20.2  TSH W/ REFLX FREE T4 IF ABNORMAL      XR SPINE CERV 4 OR 5 V      4. Essential hypertension  I10 401.9 TSH W/ REFLX FREE T4 IF ABNORMAL      5. Dyslipidemia  E78.5 272.4       6. Neuropathy  G62.9 355.9       7. Left lower quadrant abdominal pain  R10.32 789.04       8. Encounter for hepatitis C screening test for low risk patient  Z11.59 V73.89 HEPATITIS C AB        Abdominal pain: Likely due to constipation. Counseled on high-fiber foods. Advised to start taking Metamucil daily. Advised to start taking the MiraLAX daily. Will also prescribe Dulcolax if no improvement with above interventions. Patient informed that he should call back if he does not have a BM over the next 2 to 3 days. Numbness/tingling down right arm: Will check cervical x-ray. Possible cervical radiculopathy. HTN: BP is controlled on Micardis and labetalol. T2DM: Patient is only taking glyburide. HbA1c is 6.2%. Improved. Dyslipidemia: Check repeat lipid panel. Continue Crestor. Neuropathy: No longer taking gabapentin due to how it makes him feel.     Labs ordered. Refused flu shot. Follow-up and Dispositions    Return in about 4 months (around 6/8/2023). I asked the patient if he  had any questions and answered his  questions. The patient stated that he understands the treatment plan and agrees with the treatment plan    This document was created with a voice activated dictation system and may contain transcription errors.

## 2023-02-14 ENCOUNTER — TELEPHONE (OUTPATIENT)
Facility: CLINIC | Age: 53
End: 2023-02-14

## 2023-02-14 DIAGNOSIS — M54.12 CERVICAL RADICULOPATHY: Primary | ICD-10-CM

## 2023-02-14 NOTE — TELEPHONE ENCOUNTER
Please inform patient that order likely fell off due to the change in systems. Repeat has been ordered.

## 2023-02-14 NOTE — TELEPHONE ENCOUNTER
Advised patient of orders placed. He did state that he is still experiencing abdominal pain before using the bathroom. PCP was notified.

## 2023-02-14 NOTE — TELEPHONE ENCOUNTER
----- Message from Tory Renata sent at 2/13/2023  3:29 PM EST -----  Subject: Message to Provider    QUESTIONS  Information for Provider? Patient called to schedule X-ray of spine Cerv &   they advised that they do not see an order in his chart or call   [phone]   ---------------------------------------------------------------------------  --------------  [address]  [phone]; Do not leave any message, patient will call back for answer  ---------------------------------------------------------------------------  --------------  SCRIPT ANSWERS  Relationship to Patient?  Self

## 2023-02-17 ENCOUNTER — HOSPITAL ENCOUNTER (OUTPATIENT)
Facility: HOSPITAL | Age: 53
Discharge: HOME | End: 2023-02-17

## 2023-02-17 DIAGNOSIS — M54.12 CERVICAL RADICULOPATHY: ICD-10-CM

## 2023-02-17 PROCEDURE — 72050 X-RAY EXAM NECK SPINE 4/5VWS: CPT

## 2023-02-20 ENCOUNTER — HOSPITAL ENCOUNTER (OUTPATIENT)
Facility: HOSPITAL | Age: 53
Setting detail: SPECIMEN
Discharge: HOME | End: 2023-02-23

## 2023-02-20 LAB
FOLATE SERPL-MCNC: 13.8 NG/ML (ref 3.1–17.5)
TSH SERPL-ACNC: 1.43 UIU/ML (ref 0.36–3.74)
VIT B12 SERPL-MCNC: 542 PG/ML (ref 211–911)

## 2023-02-20 PROCEDURE — 82746 ASSAY OF FOLIC ACID SERUM: CPT

## 2023-02-20 PROCEDURE — 36415 COLL VENOUS BLD VENIPUNCTURE: CPT

## 2023-02-20 PROCEDURE — 86803 HEPATITIS C AB TEST: CPT

## 2023-02-20 PROCEDURE — 84443 ASSAY THYROID STIM HORMONE: CPT

## 2023-02-21 LAB
HCV AB SER IA-ACNC: <0.02 INDEX
HCV AB SERPL QL IA: NEGATIVE
Lab: NORMAL

## 2023-02-22 ENCOUNTER — TELEPHONE (OUTPATIENT)
Facility: CLINIC | Age: 53
End: 2023-02-22

## 2023-02-22 NOTE — TELEPHONE ENCOUNTER
----- Message from Malcom Jeans, MD sent at 2/21/2023 10:14 AM EST -----  Please inform patient that his TSH and vitamin B12 are normal.

## 2023-03-29 RX ORDER — TIZANIDINE 2 MG/1
TABLET ORAL
Qty: 90 TABLET | Refills: 0 | Status: SHIPPED | OUTPATIENT
Start: 2023-03-29

## 2023-04-30 DIAGNOSIS — I10 ESSENTIAL (PRIMARY) HYPERTENSION: Primary | ICD-10-CM

## 2023-05-02 RX ORDER — TELMISARTAN 40 MG/1
TABLET ORAL
Qty: 90 TABLET | Refills: 0 | Status: SHIPPED | OUTPATIENT
Start: 2023-05-02

## 2023-06-07 ENCOUNTER — OFFICE VISIT (OUTPATIENT)
Facility: CLINIC | Age: 53
End: 2023-06-07
Payer: MEDICAID

## 2023-06-07 VITALS
OXYGEN SATURATION: 97 % | BODY MASS INDEX: 35.65 KG/M2 | HEART RATE: 78 BPM | RESPIRATION RATE: 16 BRPM | SYSTOLIC BLOOD PRESSURE: 128 MMHG | DIASTOLIC BLOOD PRESSURE: 74 MMHG | WEIGHT: 214 LBS | HEIGHT: 65 IN

## 2023-06-07 DIAGNOSIS — I10 ESSENTIAL (PRIMARY) HYPERTENSION: Primary | ICD-10-CM

## 2023-06-07 DIAGNOSIS — M54.12 CERVICAL RADICULOPATHY: ICD-10-CM

## 2023-06-07 DIAGNOSIS — Z12.5 SCREENING FOR MALIGNANT NEOPLASM OF PROSTATE: ICD-10-CM

## 2023-06-07 DIAGNOSIS — E11.40 TYPE 2 DIABETES MELLITUS WITH DIABETIC NEUROPATHY, WITHOUT LONG-TERM CURRENT USE OF INSULIN (HCC): ICD-10-CM

## 2023-06-07 DIAGNOSIS — M54.16 RADICULOPATHY, LUMBAR REGION: ICD-10-CM

## 2023-06-07 DIAGNOSIS — G62.9 POLYNEUROPATHY, UNSPECIFIED: ICD-10-CM

## 2023-06-07 DIAGNOSIS — E78.5 HYPERLIPIDEMIA, UNSPECIFIED HYPERLIPIDEMIA TYPE: ICD-10-CM

## 2023-06-07 PROCEDURE — 3074F SYST BP LT 130 MM HG: CPT | Performed by: STUDENT IN AN ORGANIZED HEALTH CARE EDUCATION/TRAINING PROGRAM

## 2023-06-07 PROCEDURE — 99214 OFFICE O/P EST MOD 30 MIN: CPT | Performed by: STUDENT IN AN ORGANIZED HEALTH CARE EDUCATION/TRAINING PROGRAM

## 2023-06-07 PROCEDURE — 3078F DIAST BP <80 MM HG: CPT | Performed by: STUDENT IN AN ORGANIZED HEALTH CARE EDUCATION/TRAINING PROGRAM

## 2023-06-07 RX ORDER — IBUPROFEN 800 MG/1
800 TABLET ORAL 2 TIMES DAILY PRN
Qty: 60 TABLET | Refills: 2 | Status: SHIPPED | OUTPATIENT
Start: 2023-06-07

## 2023-06-07 RX ORDER — PRAVASTATIN SODIUM 20 MG
20 TABLET ORAL DAILY
Qty: 90 TABLET | Refills: 1 | Status: SHIPPED | OUTPATIENT
Start: 2023-06-07

## 2023-06-07 SDOH — ECONOMIC STABILITY: INCOME INSECURITY: HOW HARD IS IT FOR YOU TO PAY FOR THE VERY BASICS LIKE FOOD, HOUSING, MEDICAL CARE, AND HEATING?: SOMEWHAT HARD

## 2023-06-07 SDOH — ECONOMIC STABILITY: HOUSING INSECURITY
IN THE LAST 12 MONTHS, WAS THERE A TIME WHEN YOU DID NOT HAVE A STEADY PLACE TO SLEEP OR SLEPT IN A SHELTER (INCLUDING NOW)?: NO

## 2023-06-07 SDOH — ECONOMIC STABILITY: FOOD INSECURITY: WITHIN THE PAST 12 MONTHS, THE FOOD YOU BOUGHT JUST DIDN'T LAST AND YOU DIDN'T HAVE MONEY TO GET MORE.: NEVER TRUE

## 2023-06-07 SDOH — ECONOMIC STABILITY: FOOD INSECURITY: WITHIN THE PAST 12 MONTHS, YOU WORRIED THAT YOUR FOOD WOULD RUN OUT BEFORE YOU GOT MONEY TO BUY MORE.: NEVER TRUE

## 2023-06-07 ASSESSMENT — ENCOUNTER SYMPTOMS
NAUSEA: 0
ABDOMINAL PAIN: 0
CONSTIPATION: 1
CHEST TIGHTNESS: 0
SHORTNESS OF BREATH: 0
BACK PAIN: 1
RHINORRHEA: 0
DIARRHEA: 0
VOMITING: 0

## 2023-06-07 ASSESSMENT — ANXIETY QUESTIONNAIRES
6. BECOMING EASILY ANNOYED OR IRRITABLE: 0
IF YOU CHECKED OFF ANY PROBLEMS ON THIS QUESTIONNAIRE, HOW DIFFICULT HAVE THESE PROBLEMS MADE IT FOR YOU TO DO YOUR WORK, TAKE CARE OF THINGS AT HOME, OR GET ALONG WITH OTHER PEOPLE: NOT DIFFICULT AT ALL
5. BEING SO RESTLESS THAT IT IS HARD TO SIT STILL: 0
7. FEELING AFRAID AS IF SOMETHING AWFUL MIGHT HAPPEN: 0
1. FEELING NERVOUS, ANXIOUS, OR ON EDGE: 0
GAD7 TOTAL SCORE: 0
2. NOT BEING ABLE TO STOP OR CONTROL WORRYING: 0
3. WORRYING TOO MUCH ABOUT DIFFERENT THINGS: 0
4. TROUBLE RELAXING: 0

## 2023-06-07 ASSESSMENT — PATIENT HEALTH QUESTIONNAIRE - PHQ9
SUM OF ALL RESPONSES TO PHQ QUESTIONS 1-9: 0
SUM OF ALL RESPONSES TO PHQ QUESTIONS 1-9: 0
2. FEELING DOWN, DEPRESSED OR HOPELESS: 0
1. LITTLE INTEREST OR PLEASURE IN DOING THINGS: 0
SUM OF ALL RESPONSES TO PHQ9 QUESTIONS 1 & 2: 0
SUM OF ALL RESPONSES TO PHQ QUESTIONS 1-9: 0
SUM OF ALL RESPONSES TO PHQ QUESTIONS 1-9: 0

## 2023-06-07 NOTE — PROGRESS NOTES
Constitutional:  Negative for activity change, appetite change, fatigue and fever. HENT:  Negative for congestion, ear pain, postnasal drip and rhinorrhea. Respiratory:  Negative for chest tightness and shortness of breath. Cardiovascular:  Negative for chest pain, palpitations and leg swelling. Gastrointestinal:  Positive for constipation. Negative for abdominal pain, diarrhea, nausea and vomiting. Endocrine: Negative for cold intolerance, heat intolerance, polydipsia, polyphagia and polyuria. Genitourinary:  Negative for decreased urine volume, difficulty urinating, dysuria, enuresis, frequency and urgency. Musculoskeletal:  Positive for arthralgias, back pain, myalgias and neck pain. Negative for gait problem. Neurological:  Positive for numbness. Negative for tremors, seizures, syncope, speech difficulty, weakness, light-headedness and headaches. Psychiatric/Behavioral:  Negative for confusion, decreased concentration, dysphoric mood and self-injury. The patient is not nervous/anxious and is not hyperactive. All other systems reviewed and are negative. Allergies   Allergen Reactions    Atorvastatin Nausea Only    Iodine      Other reaction(s): Unknown (comments)       PHQ Screening   No flowsheet data found. History  Past Medical History:   Diagnosis Date    Acute sinusitis     Back pain     BBB (bundle branch block)     Bursitis disorder     Diabetes ([address]     History of cardiac cath 07/30/2012    R dom. Patent coronary arteries. LVEDP 15.  RA 10.  RV 32/10. PA 32/18. Mean PASP 22. W 14.  CO 5.7 (TD). High CO & high O2 sats on right raise question of peripheral arterial venous shunting. History of echocardiogram 06/27/2012    EF 50-55%. Dyssynergic septal motion c/w LBBB. RVSP/PASP 50-55. Mild LAE. Mild-mod TR. Mild IVCE. 30 x 5-mm echodensity in interventricular groove (poss fibrinous deposit). Sm pericardial effus at apex.     Hx of cardiac cath     [facility]

## 2023-07-12 DIAGNOSIS — I10 ESSENTIAL (PRIMARY) HYPERTENSION: ICD-10-CM

## 2023-07-13 RX ORDER — LABETALOL 100 MG/1
TABLET, FILM COATED ORAL
Qty: 180 TABLET | Refills: 0 | Status: SHIPPED | OUTPATIENT
Start: 2023-07-13

## 2023-07-13 RX ORDER — TELMISARTAN 40 MG/1
TABLET ORAL
Qty: 90 TABLET | Refills: 0 | Status: SHIPPED | OUTPATIENT
Start: 2023-07-13

## 2023-07-19 ENCOUNTER — TELEPHONE (OUTPATIENT)
Facility: CLINIC | Age: 53
End: 2023-07-19

## 2023-07-19 NOTE — TELEPHONE ENCOUNTER
Prior Auth for pt medication Telmisartan has been submitted it may  take up to 72 hours for approval or denial.

## 2023-10-03 ENCOUNTER — HOSPITAL ENCOUNTER (OUTPATIENT)
Facility: HOSPITAL | Age: 53
Setting detail: SPECIMEN
Discharge: HOME | End: 2023-10-06
Payer: COMMERCIAL

## 2023-10-03 DIAGNOSIS — Z12.5 SCREENING FOR MALIGNANT NEOPLASM OF PROSTATE: ICD-10-CM

## 2023-10-03 DIAGNOSIS — E11.40 TYPE 2 DIABETES MELLITUS WITH DIABETIC NEUROPATHY, WITHOUT LONG-TERM CURRENT USE OF INSULIN (HCC): ICD-10-CM

## 2023-10-03 DIAGNOSIS — I10 ESSENTIAL (PRIMARY) HYPERTENSION: ICD-10-CM

## 2023-10-03 DIAGNOSIS — E78.5 HYPERLIPIDEMIA, UNSPECIFIED HYPERLIPIDEMIA TYPE: ICD-10-CM

## 2023-10-03 LAB
ALBUMIN SERPL-MCNC: 4.1 G/DL (ref 3.4–5)
ALBUMIN/GLOB SERPL: 1.4 (ref 0.8–1.7)
ALP SERPL-CCNC: 73 U/L (ref 45–117)
ALT SERPL-CCNC: 43 U/L (ref 16–61)
ANION GAP SERPL CALC-SCNC: 6 MMOL/L (ref 3–18)
AST SERPL-CCNC: 18 U/L (ref 10–38)
BASOPHILS # BLD: 0.1 K/UL (ref 0–0.1)
BASOPHILS NFR BLD: 1 % (ref 0–2)
BILIRUB SERPL-MCNC: 0.6 MG/DL (ref 0.2–1)
BUN SERPL-MCNC: 14 MG/DL (ref 7–18)
BUN/CREAT SERPL: 14 (ref 12–20)
CALCIUM SERPL-MCNC: 9.3 MG/DL (ref 8.5–10.1)
CHLORIDE SERPL-SCNC: 102 MMOL/L (ref 100–111)
CHOLEST SERPL-MCNC: 182 MG/DL
CO2 SERPL-SCNC: 28 MMOL/L (ref 21–32)
CREAT SERPL-MCNC: 0.97 MG/DL (ref 0.6–1.3)
CREAT UR-MCNC: 113 MG/DL (ref 30–125)
DIFFERENTIAL METHOD BLD: ABNORMAL
EOSINOPHIL # BLD: 0.6 K/UL (ref 0–0.4)
EOSINOPHIL NFR BLD: 7 % (ref 0–5)
ERYTHROCYTE [DISTWIDTH] IN BLOOD BY AUTOMATED COUNT: 12.5 % (ref 11.6–14.5)
EST. AVERAGE GLUCOSE BLD GHB EST-MCNC: 146 MG/DL
GLOBULIN SER CALC-MCNC: 2.9 G/DL (ref 2–4)
GLUCOSE SERPL-MCNC: 180 MG/DL (ref 74–99)
HBA1C MFR BLD: 6.7 % (ref 4.2–5.6)
HCT VFR BLD AUTO: 46.3 % (ref 36–48)
HDLC SERPL-MCNC: 40 MG/DL (ref 40–60)
HDLC SERPL: 4.6 (ref 0–5)
HGB BLD-MCNC: 15.5 G/DL (ref 13–16)
IMM GRANULOCYTES # BLD AUTO: 0 K/UL (ref 0–0.04)
IMM GRANULOCYTES NFR BLD AUTO: 0 % (ref 0–0.5)
LDLC SERPL CALC-MCNC: 116.6 MG/DL (ref 0–100)
LIPID PANEL: ABNORMAL
LYMPHOCYTES # BLD: 1.8 K/UL (ref 0.9–3.6)
LYMPHOCYTES NFR BLD: 21 % (ref 21–52)
MCH RBC QN AUTO: 28.8 PG (ref 24–34)
MCHC RBC AUTO-ENTMCNC: 33.5 G/DL (ref 31–37)
MCV RBC AUTO: 86.1 FL (ref 78–100)
MICROALBUMIN UR-MCNC: 0.7 MG/DL (ref 0–3)
MICROALBUMIN/CREAT UR-RTO: 6 MG/G (ref 0–30)
MONOCYTES # BLD: 0.7 K/UL (ref 0.05–1.2)
MONOCYTES NFR BLD: 8 % (ref 3–10)
NEUTS SEG # BLD: 5.4 K/UL (ref 1.8–8)
NEUTS SEG NFR BLD: 63 % (ref 40–73)
NRBC # BLD: 0 K/UL (ref 0–0.01)
NRBC BLD-RTO: 0 PER 100 WBC
PLATELET # BLD AUTO: 288 K/UL (ref 135–420)
PMV BLD AUTO: 10.5 FL (ref 9.2–11.8)
POTASSIUM SERPL-SCNC: 4.6 MMOL/L (ref 3.5–5.5)
PROT SERPL-MCNC: 7 G/DL (ref 6.4–8.2)
PSA SERPL-MCNC: 0.4 NG/ML (ref 0–4)
RBC # BLD AUTO: 5.38 M/UL (ref 4.35–5.65)
SODIUM SERPL-SCNC: 136 MMOL/L (ref 136–145)
TRIGL SERPL-MCNC: 127 MG/DL
VLDLC SERPL CALC-MCNC: 25.4 MG/DL
WBC # BLD AUTO: 8.6 K/UL (ref 4.6–13.2)

## 2023-10-03 PROCEDURE — 82043 UR ALBUMIN QUANTITATIVE: CPT

## 2023-10-03 PROCEDURE — 83036 HEMOGLOBIN GLYCOSYLATED A1C: CPT

## 2023-10-03 PROCEDURE — 80053 COMPREHEN METABOLIC PANEL: CPT

## 2023-10-03 PROCEDURE — 85025 COMPLETE CBC W/AUTO DIFF WBC: CPT

## 2023-10-03 PROCEDURE — G0103 PSA SCREENING: HCPCS

## 2023-10-03 PROCEDURE — 82570 ASSAY OF URINE CREATININE: CPT

## 2023-10-03 PROCEDURE — 80061 LIPID PANEL: CPT

## 2023-10-03 PROCEDURE — 36415 COLL VENOUS BLD VENIPUNCTURE: CPT

## 2023-10-10 ENCOUNTER — OFFICE VISIT (OUTPATIENT)
Facility: CLINIC | Age: 53
End: 2023-10-10
Payer: COMMERCIAL

## 2023-10-10 VITALS
HEART RATE: 74 BPM | HEIGHT: 65 IN | SYSTOLIC BLOOD PRESSURE: 126 MMHG | BODY MASS INDEX: 35.16 KG/M2 | WEIGHT: 211 LBS | TEMPERATURE: 96.9 F | DIASTOLIC BLOOD PRESSURE: 71 MMHG | RESPIRATION RATE: 16 BRPM

## 2023-10-10 DIAGNOSIS — E78.5 HYPERLIPIDEMIA, UNSPECIFIED HYPERLIPIDEMIA TYPE: ICD-10-CM

## 2023-10-10 DIAGNOSIS — E11.40 TYPE 2 DIABETES MELLITUS WITH DIABETIC NEUROPATHY, WITHOUT LONG-TERM CURRENT USE OF INSULIN (HCC): ICD-10-CM

## 2023-10-10 DIAGNOSIS — H61.23 EXCESSIVE CERUMEN IN BOTH EAR CANALS: ICD-10-CM

## 2023-10-10 DIAGNOSIS — H93.13 TINNITUS OF BOTH EARS: Primary | ICD-10-CM

## 2023-10-10 DIAGNOSIS — I10 ESSENTIAL (PRIMARY) HYPERTENSION: ICD-10-CM

## 2023-10-10 PROCEDURE — 3078F DIAST BP <80 MM HG: CPT | Performed by: STUDENT IN AN ORGANIZED HEALTH CARE EDUCATION/TRAINING PROGRAM

## 2023-10-10 PROCEDURE — 3074F SYST BP LT 130 MM HG: CPT | Performed by: STUDENT IN AN ORGANIZED HEALTH CARE EDUCATION/TRAINING PROGRAM

## 2023-10-10 PROCEDURE — 99214 OFFICE O/P EST MOD 30 MIN: CPT | Performed by: STUDENT IN AN ORGANIZED HEALTH CARE EDUCATION/TRAINING PROGRAM

## 2023-10-10 PROCEDURE — 3044F HG A1C LEVEL LT 7.0%: CPT | Performed by: STUDENT IN AN ORGANIZED HEALTH CARE EDUCATION/TRAINING PROGRAM

## 2023-10-10 SDOH — ECONOMIC STABILITY: FOOD INSECURITY: WITHIN THE PAST 12 MONTHS, YOU WORRIED THAT YOUR FOOD WOULD RUN OUT BEFORE YOU GOT MONEY TO BUY MORE.: NEVER TRUE

## 2023-10-10 SDOH — ECONOMIC STABILITY: INCOME INSECURITY: HOW HARD IS IT FOR YOU TO PAY FOR THE VERY BASICS LIKE FOOD, HOUSING, MEDICAL CARE, AND HEATING?: NOT HARD AT ALL

## 2023-10-10 SDOH — ECONOMIC STABILITY: FOOD INSECURITY: WITHIN THE PAST 12 MONTHS, THE FOOD YOU BOUGHT JUST DIDN'T LAST AND YOU DIDN'T HAVE MONEY TO GET MORE.: NEVER TRUE

## 2023-10-10 ASSESSMENT — ANXIETY QUESTIONNAIRES
3. WORRYING TOO MUCH ABOUT DIFFERENT THINGS: 0
IF YOU CHECKED OFF ANY PROBLEMS ON THIS QUESTIONNAIRE, HOW DIFFICULT HAVE THESE PROBLEMS MADE IT FOR YOU TO DO YOUR WORK, TAKE CARE OF THINGS AT HOME, OR GET ALONG WITH OTHER PEOPLE: NOT DIFFICULT AT ALL
4. TROUBLE RELAXING: 0
2. NOT BEING ABLE TO STOP OR CONTROL WORRYING: 0
6. BECOMING EASILY ANNOYED OR IRRITABLE: 0
GAD7 TOTAL SCORE: 0
7. FEELING AFRAID AS IF SOMETHING AWFUL MIGHT HAPPEN: 0
1. FEELING NERVOUS, ANXIOUS, OR ON EDGE: 0
5. BEING SO RESTLESS THAT IT IS HARD TO SIT STILL: 0

## 2023-10-10 ASSESSMENT — PATIENT HEALTH QUESTIONNAIRE - PHQ9
2. FEELING DOWN, DEPRESSED OR HOPELESS: 0
SUM OF ALL RESPONSES TO PHQ QUESTIONS 1-9: 0
1. LITTLE INTEREST OR PLEASURE IN DOING THINGS: 0
SUM OF ALL RESPONSES TO PHQ QUESTIONS 1-9: 0
SUM OF ALL RESPONSES TO PHQ QUESTIONS 1-9: 0
SUM OF ALL RESPONSES TO PHQ9 QUESTIONS 1 & 2: 0
SUM OF ALL RESPONSES TO PHQ QUESTIONS 1-9: 0

## 2023-10-10 ASSESSMENT — ENCOUNTER SYMPTOMS
CONSTIPATION: 1
RHINORRHEA: 0
BACK PAIN: 1
SHORTNESS OF BREATH: 0
VOMITING: 0
CHEST TIGHTNESS: 0
ABDOMINAL PAIN: 0
NAUSEA: 0
DIARRHEA: 0

## 2023-10-10 NOTE — PROGRESS NOTES
Eric De Oliveira is a 47 y.o. male presenting today for Follow-up  . Chief Complaint   Patient presents with    Follow-up       HPI:  Eric De Oliveira presents to the office today for follow up. Patient has a past medical history of HTN, HLD, T2DM, pulmonary hypertension, chronic LBBB. Patient presented to the ED on 2/6/2023 with abdominal pain. CT AP showed cholelithiasis but no acute findings. He was also complaining of a headache so CT head was done which was negative. Labs were normal aside from a slightly elevated WBC count at 11.2 with neutrophilia. He was prescribed Bentyl and MiraLAX. He has a history of constipation documented in prior visits to GI. Has had intermittent rectal bleeding in the past due to hard stools. Patient is also complaining of intermittent right-sided numbness and tingling. CT head in ED was negative. This is a chronic complaint since many years. HTN: Taking Micardis and labetalol. BP is controlled. HLD: Patient stopped taking the Crestor-states that it made him feel strange. He had nausea before with Lipitor. Lipid panel in 10/23 showed .6, HDL 40, triglycerides 127. Patient was started on pravastatin which she has been tolerating. He only started taking it recently. T2DM: Currently on glyburide. He stopped takng Janumet - made him feel sluggish. He has peripheral neuropathy and was previously prescribed Neurontin but stopped taking it. Recent HbA1c was 6.7% in 10/23. No microalbuminuria noted. Chronic LBBB: Following with cardiology. He underwent a cardiac catheterization 2012 which did not show any significant coronary disease. Nuclear stress stressed in 2017 did not show any ischemia. He is complaining of back pain, numbness in bilateral feet. He has a history of spinal stenosis. Was previously following with neurosurgery but has not followed up. He is no longer taking gabapentin.   Not interested in trying Lyrica

## 2023-10-31 DIAGNOSIS — I10 ESSENTIAL (PRIMARY) HYPERTENSION: ICD-10-CM

## 2023-11-01 RX ORDER — GLYBURIDE 2.5 MG/1
TABLET ORAL
Qty: 90 TABLET | Refills: 0 | Status: SHIPPED | OUTPATIENT
Start: 2023-11-01

## 2023-11-01 RX ORDER — TELMISARTAN 40 MG/1
TABLET ORAL
Qty: 90 TABLET | Refills: 0 | Status: SHIPPED | OUTPATIENT
Start: 2023-11-01

## 2023-11-13 ENCOUNTER — OFFICE VISIT (OUTPATIENT)
Facility: CLINIC | Age: 53
End: 2023-11-13
Payer: COMMERCIAL

## 2023-11-13 VITALS
RESPIRATION RATE: 16 BRPM | BODY MASS INDEX: 35.65 KG/M2 | HEART RATE: 84 BPM | TEMPERATURE: 98.7 F | OXYGEN SATURATION: 95 % | SYSTOLIC BLOOD PRESSURE: 137 MMHG | DIASTOLIC BLOOD PRESSURE: 74 MMHG | WEIGHT: 214 LBS | HEIGHT: 65 IN

## 2023-11-13 DIAGNOSIS — H93.13 TINNITUS OF BOTH EARS: ICD-10-CM

## 2023-11-13 DIAGNOSIS — R42 VERTIGO: Primary | ICD-10-CM

## 2023-11-13 PROCEDURE — 3078F DIAST BP <80 MM HG: CPT | Performed by: STUDENT IN AN ORGANIZED HEALTH CARE EDUCATION/TRAINING PROGRAM

## 2023-11-13 PROCEDURE — 3075F SYST BP GE 130 - 139MM HG: CPT | Performed by: STUDENT IN AN ORGANIZED HEALTH CARE EDUCATION/TRAINING PROGRAM

## 2023-11-13 PROCEDURE — 99213 OFFICE O/P EST LOW 20 MIN: CPT | Performed by: STUDENT IN AN ORGANIZED HEALTH CARE EDUCATION/TRAINING PROGRAM

## 2023-11-13 RX ORDER — MECLIZINE HYDROCHLORIDE 25 MG/1
25 TABLET ORAL 3 TIMES DAILY PRN
Qty: 30 TABLET | Refills: 0 | Status: SHIPPED | OUTPATIENT
Start: 2023-11-13 | End: 2023-11-23

## 2023-11-13 SDOH — ECONOMIC STABILITY: FOOD INSECURITY: WITHIN THE PAST 12 MONTHS, THE FOOD YOU BOUGHT JUST DIDN'T LAST AND YOU DIDN'T HAVE MONEY TO GET MORE.: NEVER TRUE

## 2023-11-13 SDOH — ECONOMIC STABILITY: INCOME INSECURITY: HOW HARD IS IT FOR YOU TO PAY FOR THE VERY BASICS LIKE FOOD, HOUSING, MEDICAL CARE, AND HEATING?: NOT HARD AT ALL

## 2023-11-13 SDOH — ECONOMIC STABILITY: FOOD INSECURITY: WITHIN THE PAST 12 MONTHS, YOU WORRIED THAT YOUR FOOD WOULD RUN OUT BEFORE YOU GOT MONEY TO BUY MORE.: NEVER TRUE

## 2023-11-13 ASSESSMENT — ENCOUNTER SYMPTOMS
RHINORRHEA: 0
BACK PAIN: 1
VOMITING: 0
ABDOMINAL PAIN: 0
SHORTNESS OF BREATH: 0
CHEST TIGHTNESS: 0
NAUSEA: 0
CONSTIPATION: 1
DIARRHEA: 0

## 2023-11-13 ASSESSMENT — ANXIETY QUESTIONNAIRES
1. FEELING NERVOUS, ANXIOUS, OR ON EDGE: 0
6. BECOMING EASILY ANNOYED OR IRRITABLE: 0
7. FEELING AFRAID AS IF SOMETHING AWFUL MIGHT HAPPEN: 0
IF YOU CHECKED OFF ANY PROBLEMS ON THIS QUESTIONNAIRE, HOW DIFFICULT HAVE THESE PROBLEMS MADE IT FOR YOU TO DO YOUR WORK, TAKE CARE OF THINGS AT HOME, OR GET ALONG WITH OTHER PEOPLE: NOT DIFFICULT AT ALL
2. NOT BEING ABLE TO STOP OR CONTROL WORRYING: 0
3. WORRYING TOO MUCH ABOUT DIFFERENT THINGS: 0
GAD7 TOTAL SCORE: 0
4. TROUBLE RELAXING: 0
5. BEING SO RESTLESS THAT IT IS HARD TO SIT STILL: 0

## 2023-11-13 ASSESSMENT — PATIENT HEALTH QUESTIONNAIRE - PHQ9
SUM OF ALL RESPONSES TO PHQ QUESTIONS 1-9: 0
SUM OF ALL RESPONSES TO PHQ9 QUESTIONS 1 & 2: 0
SUM OF ALL RESPONSES TO PHQ QUESTIONS 1-9: 0
1. LITTLE INTEREST OR PLEASURE IN DOING THINGS: 0
2. FEELING DOWN, DEPRESSED OR HOPELESS: 0

## 2023-11-13 NOTE — PROGRESS NOTES
mg/g Final    PSA 10/03/2023 0.4  0.0 - 4.0 ng/mL Final       No results found for any visits on 11/13/23. Patient Care Team:  Patient Care Team:  Shruthi Davidson MD as PCP - Delmy Murillo MD as PCP - [facility] Provider      Assessment / Plan:     Diagnosis Orders   1. Vertigo  Vascular duplex carotid bilateral    meclizine (ANTIVERT) 25 MG tablet      2. Tinnitus of both ears            Tinnitus: Patient complaining of tinnitus, vertigo since a few months. He did see ENT  but was not satisfied and did not return for a follow-up visit. Per patient's reporting-sounds like he was recommended more testing. Will request ENT records. For now-will order carotid Doppler to r/o CAD and prescribe meclizine to take as needed    Return if symptoms worsen or fail to improve. I asked the patient if he  had any questions and answered his  questions. The patient stated that he understands the treatment plan and agrees with the treatment plan    This document was created with a voice activated dictation system and may contain transcription errors.

## 2023-11-15 RX ORDER — TIZANIDINE 2 MG/1
TABLET ORAL
Qty: 90 TABLET | Refills: 0 | Status: SHIPPED | OUTPATIENT
Start: 2023-11-15

## 2023-11-16 ENCOUNTER — OFFICE VISIT (OUTPATIENT)
Age: 53
End: 2023-11-16
Payer: COMMERCIAL

## 2023-11-16 VITALS
WEIGHT: 213 LBS | HEIGHT: 65 IN | BODY MASS INDEX: 35.49 KG/M2 | SYSTOLIC BLOOD PRESSURE: 124 MMHG | HEART RATE: 81 BPM | DIASTOLIC BLOOD PRESSURE: 82 MMHG | OXYGEN SATURATION: 96 %

## 2023-11-16 DIAGNOSIS — E11.40 TYPE 2 DIABETES MELLITUS WITH DIABETIC NEUROPATHY, WITHOUT LONG-TERM CURRENT USE OF INSULIN (HCC): ICD-10-CM

## 2023-11-16 DIAGNOSIS — E66.01 SEVERE OBESITY (BMI 35.0-39.9) WITH COMORBIDITY (HCC): ICD-10-CM

## 2023-11-16 DIAGNOSIS — I44.7 LBBB (LEFT BUNDLE BRANCH BLOCK): Primary | ICD-10-CM

## 2023-11-16 DIAGNOSIS — I10 ESSENTIAL (PRIMARY) HYPERTENSION: ICD-10-CM

## 2023-11-16 DIAGNOSIS — E78.5 DYSLIPIDEMIA: ICD-10-CM

## 2023-11-16 PROBLEM — M79.18 MYOFASCIAL MUSCLE PAIN: Status: ACTIVE | Noted: 2018-08-21

## 2023-11-16 PROCEDURE — 93000 ELECTROCARDIOGRAM COMPLETE: CPT | Performed by: INTERNAL MEDICINE

## 2023-11-16 PROCEDURE — 3074F SYST BP LT 130 MM HG: CPT | Performed by: INTERNAL MEDICINE

## 2023-11-16 PROCEDURE — 3079F DIAST BP 80-89 MM HG: CPT | Performed by: INTERNAL MEDICINE

## 2023-11-16 PROCEDURE — 3044F HG A1C LEVEL LT 7.0%: CPT | Performed by: INTERNAL MEDICINE

## 2023-11-16 PROCEDURE — 99214 OFFICE O/P EST MOD 30 MIN: CPT | Performed by: INTERNAL MEDICINE

## 2023-11-16 ASSESSMENT — PATIENT HEALTH QUESTIONNAIRE - PHQ9
SUM OF ALL RESPONSES TO PHQ QUESTIONS 1-9: 0
1. LITTLE INTEREST OR PLEASURE IN DOING THINGS: 0
SUM OF ALL RESPONSES TO PHQ9 QUESTIONS 1 & 2: 0
SUM OF ALL RESPONSES TO PHQ QUESTIONS 1-9: 0
2. FEELING DOWN, DEPRESSED OR HOPELESS: 0
SUM OF ALL RESPONSES TO PHQ QUESTIONS 1-9: 0
SUM OF ALL RESPONSES TO PHQ QUESTIONS 1-9: 0

## 2023-11-16 ASSESSMENT — ANXIETY QUESTIONNAIRES
3. WORRYING TOO MUCH ABOUT DIFFERENT THINGS: 0
4. TROUBLE RELAXING: 0
2. NOT BEING ABLE TO STOP OR CONTROL WORRYING: 0
1. FEELING NERVOUS, ANXIOUS, OR ON EDGE: 0
6. BECOMING EASILY ANNOYED OR IRRITABLE: 0
7. FEELING AFRAID AS IF SOMETHING AWFUL MIGHT HAPPEN: 0
5. BEING SO RESTLESS THAT IT IS HARD TO SIT STILL: 0
GAD7 TOTAL SCORE: 0

## 2023-11-16 ASSESSMENT — ENCOUNTER SYMPTOMS
SHORTNESS OF BREATH: 0
ABDOMINAL DISTENTION: 0
COUGH: 0
VOMITING: 0
SORE THROAT: 0
ABDOMINAL PAIN: 0
NAUSEA: 0

## 2023-11-16 NOTE — PROGRESS NOTES
Carla Merchant presents today for   Chief Complaint   Patient presents with    Follow-up     1 year       Carla Merchant preferred language for health care discussion is english/other. Is someone accompanying this pt? no    Is the patient using any DME equipment during OV? no    Depression Screening:  Depression: Not at risk (11/16/2023)    PHQ-2     PHQ-2 Score: 0        Learning Assessment:  Who is the primary learner? Patient    What is the preferred language for health care of the primary learner? ENGLISH    How does the primary learner prefer to learn new concepts? DEMONSTRATION    Answered By patient    Relationship to Learner SELF           Pt currently taking Anticoagulant therapy? no    Pt currently taking Antiplatelet therapy ? Aspirin 81 mg daily      Coordination of Care:  1. Have you been to the ER, urgent care clinic since your last visit? Hospitalized since your last visit? no    2. Have you seen or consulted any other health care providers outside of the [address] [place] [address] since your last visit? Include any pap smears or colon screening.  no
person, place, and time. Psychiatric:         Mood and Affect: Mood normal.         Behavior: Behavior normal.        EKG: Normal sinus rhythm, left axis deviation, left bundle branch block. No change compared to the previous EKG. Assessment / Plan:   Heart palpitations. No significant change in frequency or severity since last visit. He remains on a beta-blocker. I still suspect he may be having either PVCs or PACs. No additional work-up needed. Essential hypertension. Patient blood pressure appears to be well-controlled on his current medical regimen. All of which I would continue. Chronic left bundle branch block. Unchanged on EKG. patient underwent a cardiac catheterization in 2012 which did not show any significant coronary disease. He then underwent a pharmacologic nuclear stress test in January 2017 which did not show any ischemia. Diabetes mellitus, type II. This is been managed with oral agents. Historically this has been poorly controlled. He now has evidence for peripheral neuropathy as well. Dyslipidemia. Patient is now taking pravastatin 20 mg daily. This is being managed by his PCP. Dizziness. Patient symptoms sound more like an inner ear issue. He may have developed labyrinth-itis. He is scheduled to follow-up with an ENT physician is also scheduled carotid duplex scan which was ordered by his PCP. No additional cardiac work-up necessary for this at this time. Follow-up annually, sooner if needed.         Etienne Rojas MD

## 2023-11-21 ENCOUNTER — HOSPITAL ENCOUNTER (OUTPATIENT)
Facility: HOSPITAL | Age: 53
Discharge: HOME | End: 2023-11-23
Attending: STUDENT IN AN ORGANIZED HEALTH CARE EDUCATION/TRAINING PROGRAM
Payer: COMMERCIAL

## 2023-11-21 DIAGNOSIS — R42 VERTIGO: ICD-10-CM

## 2023-11-21 PROCEDURE — 93880 EXTRACRANIAL BILAT STUDY: CPT

## 2023-11-22 LAB
VAS LEFT CCA DIST EDV: 20 CM/S
VAS LEFT CCA DIST PSV: 115.2 CM/S
VAS LEFT CCA MID EDV: 24.6 CM/S
VAS LEFT CCA MID PSV: 131.5 CM/S
VAS LEFT CCA PROX EDV: 26.9 CM/S
VAS LEFT CCA PROX PSV: 157 CM/S
VAS LEFT ECA EDV: 15.3 CM/S
VAS LEFT ECA PSV: 122.2 CM/S
VAS LEFT ICA DIST EDV: 28 CM/S
VAS LEFT ICA DIST PSV: 72 CM/S
VAS LEFT ICA MID EDV: 14.5 CM/S
VAS LEFT ICA MID PSV: 62 CM/S
VAS LEFT ICA PROX EDV: 15.5 CM/S
VAS LEFT ICA PROX PSV: 57.5 CM/S
VAS LEFT ICA/CCA PSV: 0.62 NO UNITS
VAS LEFT SUBCLAVIAN PROX EDV: 0 CM/S
VAS LEFT SUBCLAVIAN PROX PSV: 143.7 CM/S
VAS LEFT VERTEBRAL EDV: 17.6 CM/S
VAS LEFT VERTEBRAL PSV: 57.7 CM/S
VAS RIGHT CCA DIST EDV: 18.7 CM/S
VAS RIGHT CCA DIST PSV: 107.6 CM/S
VAS RIGHT CCA MID EDV: 17.7 CM/S
VAS RIGHT CCA MID PSV: 124.5 CM/S
VAS RIGHT CCA PROX EDV: 29.3 CM/S
VAS RIGHT CCA PROX PSV: 180.2 CM/S
VAS RIGHT ECA EDV: 0 CM/S
VAS RIGHT ECA PSV: 97.9 CM/S
VAS RIGHT ICA DIST EDV: 19.4 CM/S
VAS RIGHT ICA DIST PSV: 66.9 CM/S
VAS RIGHT ICA MID EDV: 22 CM/S
VAS RIGHT ICA MID PSV: 68.8 CM/S
VAS RIGHT ICA PROX EDV: 25.2 CM/S
VAS RIGHT ICA PROX PSV: 101.1 CM/S
VAS RIGHT ICA/CCA PSV: 0.9 NO UNITS
VAS RIGHT SUBCLAVIAN PROX EDV: 0 CM/S
VAS RIGHT SUBCLAVIAN PROX PSV: 157.8 CM/S
VAS RIGHT VERTEBRAL EDV: 15.4 CM/S
VAS RIGHT VERTEBRAL PSV: 46 CM/S

## 2023-11-22 PROCEDURE — 93880 EXTRACRANIAL BILAT STUDY: CPT | Performed by: STUDENT IN AN ORGANIZED HEALTH CARE EDUCATION/TRAINING PROGRAM

## 2024-01-23 DIAGNOSIS — I10 ESSENTIAL (PRIMARY) HYPERTENSION: ICD-10-CM

## 2024-01-31 ENCOUNTER — TELEPHONE (OUTPATIENT)
Facility: CLINIC | Age: 54
End: 2024-01-31

## 2024-01-31 DIAGNOSIS — I10 ESSENTIAL (PRIMARY) HYPERTENSION: ICD-10-CM

## 2024-01-31 DIAGNOSIS — E11.40 TYPE 2 DIABETES MELLITUS WITH DIABETIC NEUROPATHY, WITHOUT LONG-TERM CURRENT USE OF INSULIN (HCC): Primary | ICD-10-CM

## 2024-01-31 RX ORDER — GLYBURIDE 2.5 MG/1
TABLET ORAL
Qty: 90 TABLET | Refills: 0 | OUTPATIENT
Start: 2024-01-31

## 2024-01-31 RX ORDER — GLYBURIDE 2.5 MG/1
2.5 TABLET ORAL
Qty: 90 TABLET | Refills: 0 | Status: SHIPPED | OUTPATIENT
Start: 2024-01-31

## 2024-01-31 RX ORDER — TELMISARTAN 40 MG/1
40 TABLET ORAL DAILY
Qty: 90 TABLET | Refills: 0 | Status: SHIPPED | OUTPATIENT
Start: 2024-01-31

## 2024-01-31 RX ORDER — TELMISARTAN 40 MG/1
TABLET ORAL
Qty: 90 TABLET | Refills: 0 | OUTPATIENT
Start: 2024-01-31

## 2024-01-31 NOTE — TELEPHONE ENCOUNTER
Following up on medication request for refill     Glyburide   Telmisartan       Completely out of medication

## 2024-03-06 NOTE — PROGRESS NOTES
[facility]  [address]  [address], [address]   Phone: [phone]  Fax: [phone]        INITIAL CONSULTATION      HISTORY OF PRESENT ILLNESS:  Amy Ram is a 56 y.o. male whom is referred from Thomas Reyes NP secondary to low back pain extending into the BLE (R=L), progressive since 11/2017. Pt states he experienced intermittent low back pain for several months which was tolerable until 11/2017. No specific injury/trauma. He rates pain 3-10/10. Pt has a diagnosis of neuropathy and endorses paraesthesias of the bilateral feet. Note from Thomas Reyes NP dated 12/11/17 indicating patient was seen with c/o back pain radiating into the RLE, worse with ambulation. Of note, he missed several days of work due to his pain. At that time, he was treated with Prednisone, Tramadol and Neurontin 100 mg 2 tabs qhs. Pt reports Prednisone provided no relief. Pt was recently diagnosed with DM but does not have to monitor his blood sugars regularly. Pt denies change in bowel or bladder habits. Pt denies fever, weight loss, or skin changes. Lumbar spine MRI dated 12/14/17 reviewed. Per report, extensive epidural lipomatosis which causes moderate canal narrowing at L4-L5 and moderate to severe canal narrowing at L5-S1.  reviewed. Body mass index is 36.88 kg/(m^2). Past Medical History:   Diagnosis Date    Acute sinusitis     BBB (bundle branch block)     Bursitis disorder     Cardiac cath 07/30/2012    R dom. Patent coronary arteries. LVEDP 15.  RA 10.  RV 32/10. PA 32/18. Mean PASP 22. W 14.  CO 5.7 (TD). High CO & high O2 sats on right raise question of peripheral arterial venous shunting.  Cardiac echocardiogram 06/27/2012    EF 50-55%. Dyssynergic septal motion c/w LBBB. RVSP/PASP 50-55. Mild LAE. Mild-mod TR. Mild IVCE. 30 x 5-mm echodensity in interventricular groove (poss fibrinous deposit). Sm pericardial effus at apex.     Hx of [FreeTextEntry1] : She has a history of Stage IA left breast cancer\par Family history of breast cancer in her sister in her mid 30's and early 40's and a paternal aunt at age 45\par Genetic testing negative, (CHEK2 VUS)\par 10/4/2021 s/p left 1:00 Aysha  localization lumpectomy, left axillary sentinel lymph node biopsy and left 5:00 Aysha  localization breast biopsy with bilateral oncoplastic lift. 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 Pathology left 1:00 invasive lobular carcinoma, classical type, grade 2. The invasive tumor is present in 2 foci measuring 1.3 cm and 0.2 cm, no LVI, positive margins. 8 LNs negative. ER+/CO+/Her2-\par Plastics Dr. Watson\par 10/28/2021 s/p left re-excision lumpectomy. Pathology focal ALH, no residual carcinoma\par Oncotype Dx 17\par Rad ONC: Dr. Charles, completed XRT 1/8/2022\par Med ONC: Dr. Bui, started Tamoxifen 1/2022. saw him 9/2022\par 5/23/2022 Bilateral mammogram: right reduction scarring, left post-op scarring with skin thickening and increased trabeculation c/w post-treatment changes\par Bilateral ultrasound: Fat necrosis/oil cysts right 8:00 (N8cm) x 2, 9:00 (N2cm), 8:00 (N2cm), 8:00 (N6cm) and left 3:00 (N7cm), 3:00 (N1cm), 2:00 (N4cm). Left 4:00 scarring and right 8:00 scarring with associated 1.5 cm area of fat necrosis. BI-RADS 2\par 5/23/2022 Bilateral MRI: benign post-op/post-treatment changes. BI-RADS 2 cardiac cath     Braeden's Daughters at about 12years old.  Obesity     Other ill-defined conditions(799.89)     heart cath 2012    Skin lesion     Umbilical hernia without obstruction or gangrene     Unspecified sleep apnea     no diagnosed          Past Surgical History:   Procedure Laterality Date    HX HEART CATHETERIZATION  07/30/12    HX HEENT      dental extract         Social History   Substance Use Topics    Smoking status: Never Smoker    Smokeless tobacco: Never Used    Alcohol use 0.0 oz/week     2 - 3 Standard drinks or equivalent per week     Work status: The patient is employed. Marital status: The patient is single. Current Outpatient Prescriptions   Medication Sig Dispense Refill    gabapentin (NEURONTIN) 300 mg capsule Take 1 Cap by mouth three (3) times daily. 90 Cap 1    gabapentin (NEURONTIN) 100 mg capsule Take 2 Caps by mouth nightly. 60 Cap 1    traMADol (ULTRAM) 50 mg tablet Take 1 Tab by mouth every six (6) hours as needed for Pain. Max Daily Amount: 200 mg. 12 Tab 0    metFORMIN (GLUCOPHAGE) 500 mg tablet Take 1 Tab by mouth two (2) times daily (with meals). 60 Tab 1    valsartan-hydroCHLOROthiazide (DIOVAN-HCT) 80-12.5 mg per tablet TAKE ONE TABLET BY MOUTH ONCE DAILY 30 Tab 1    labetalol (NORMODYNE) 100 mg tablet TAKE ONE TABLET BY MOUTH TWICE DAILY 60 Tab 2    aspirin delayed-release 81 mg tablet Take  by mouth daily.  IBUPROFEN PO Take  by mouth as needed.  magnesium 250 mg tab Take  by mouth.  b complex vitamins (B COMPLEX 1) tablet Take 1 Tab by mouth daily.          Allergies   Allergen Reactions    Iodine Unknown (comments)            Family History   Problem Relation Age of Onset    Diabetes Mother     Cancer Mother     Cancer Other      lung; grandfather-nos         REVIEW OF SYSTEMS  Constitutional symptoms: Negative  Eyes: Negative  Ears, Nose, Throat, and Mouth: Negative  Cardiovascular: Negative  Respiratory: Negative  Genitourinary: Negative  Integumentary (Skin and/or breast): Negative  Musculoskeletal: Positive for low back pain into the BLE. Extremities: Negative for edema. Endocrine/Rheumatologic: Negative  Hematologic/Lymphatic: Negative  Allergic/Immunologic: Negative  Psychiatric: Negative       PHYSICAL EXAMINATION    Visit Vitals    /87    Pulse 85    Resp 16    Ht 5' 5\" (1.651 m)    Wt 221 lb 9.6 oz (100.5 kg)    BMI 36.88 kg/m2       CONSTITUTIONAL: NAD, A&O x 3  HEART: Regular rate and rhythm  ABDOMEN: Positive bowel sounds, soft, nontender, and nondistended  LUNGS: Clear to auscultation bilaterally. SKIN: No rashes noted. RANGE OF MOTION: The patient has full passive range of motion in all four extremities. SENSATION: Sensation is intact to light touch throughout. MOTOR:   Straight Leg Raise: Negative, bilateral  Pierre: Negative, bilateral  Deep tendon reflexes are 0 at the brachioradialis, biceps, and triceps. Deep tendon reflexes are 0 at the knees and ankles bilaterally. Shoulder AB/Flex Elbow Flex Wrist Ext Elbow Ext Wrist Flex Hand Intrin Tone   Right +4/5 +4/5 +4/5 +4/5 +4/5 +4/5 +4/5   Left +4/5 +4/5 +4/5 +4/5 +4/5 +4/5 +4/5              Hip Flex Knee Ext Knee Flex Ankle DF GTE Ankle PF Tone   Right +4/5 +4/5 +4/5 +4/5 +4/5 +4/5 +4/5   Left +4/5 +4/5 +4/5 +4/5 +4/5 +4/5 +4/5       ASSESSMENT   Diagnoses and all orders for this visit:    1. Spinal stenosis, lumbar region with neurogenic claudication    2. Lumbar neuritis    3. DDD (degenerative disc disease), lumbar    Other orders  -     gabapentin (NEURONTIN) 300 mg capsule; Take 1 Cap by mouth three (3) times daily. IMPRESSIONS/RECOMMENDATIONS:  The patient presents for progressive low back pain with bilateral radicular type symptoms. He is neurologically intact. We discussed multiple treatment options. Patient would like to proceed conservatively. I will give him a Rx for Neurontin 300 mg TID.  Patient advised to call the office if intolerant to medication. I will see the patient back in 1 month's time or earlier if needed. Written by Ema Overton, as dictated by April Mcfarland MD  I examined the patient, reviewed and agree with the note.

## 2024-04-08 RX ORDER — LABETALOL 100 MG/1
TABLET, FILM COATED ORAL
Qty: 180 TABLET | Refills: 0 | Status: SHIPPED | OUTPATIENT
Start: 2024-04-08

## 2024-04-09 ENCOUNTER — OFFICE VISIT (OUTPATIENT)
Facility: CLINIC | Age: 54
End: 2024-04-09
Payer: COMMERCIAL

## 2024-04-09 VITALS
SYSTOLIC BLOOD PRESSURE: 126 MMHG | HEART RATE: 79 BPM | DIASTOLIC BLOOD PRESSURE: 73 MMHG | RESPIRATION RATE: 16 BRPM | OXYGEN SATURATION: 97 % | HEIGHT: 65 IN | TEMPERATURE: 98.6 F | WEIGHT: 216 LBS | BODY MASS INDEX: 35.99 KG/M2

## 2024-04-09 DIAGNOSIS — M54.16 RADICULOPATHY, LUMBAR REGION: ICD-10-CM

## 2024-04-09 DIAGNOSIS — I10 ESSENTIAL (PRIMARY) HYPERTENSION: ICD-10-CM

## 2024-04-09 DIAGNOSIS — E78.5 HYPERLIPIDEMIA, UNSPECIFIED HYPERLIPIDEMIA TYPE: ICD-10-CM

## 2024-04-09 DIAGNOSIS — E11.40 TYPE 2 DIABETES MELLITUS WITH DIABETIC NEUROPATHY, WITHOUT LONG-TERM CURRENT USE OF INSULIN (HCC): Primary | ICD-10-CM

## 2024-04-09 DIAGNOSIS — G62.9 POLYNEUROPATHY, UNSPECIFIED: ICD-10-CM

## 2024-04-09 LAB — HBA1C MFR BLD: 7 %

## 2024-04-09 PROCEDURE — 83036 HEMOGLOBIN GLYCOSYLATED A1C: CPT | Performed by: STUDENT IN AN ORGANIZED HEALTH CARE EDUCATION/TRAINING PROGRAM

## 2024-04-09 PROCEDURE — 99214 OFFICE O/P EST MOD 30 MIN: CPT | Performed by: STUDENT IN AN ORGANIZED HEALTH CARE EDUCATION/TRAINING PROGRAM

## 2024-04-09 PROCEDURE — 3074F SYST BP LT 130 MM HG: CPT | Performed by: STUDENT IN AN ORGANIZED HEALTH CARE EDUCATION/TRAINING PROGRAM

## 2024-04-09 PROCEDURE — 3078F DIAST BP <80 MM HG: CPT | Performed by: STUDENT IN AN ORGANIZED HEALTH CARE EDUCATION/TRAINING PROGRAM

## 2024-04-09 RX ORDER — GLYBURIDE 5 MG/1
5 TABLET ORAL 2 TIMES DAILY WITH MEALS
Qty: 180 TABLET | Refills: 1 | Status: SHIPPED | OUTPATIENT
Start: 2024-04-09

## 2024-04-09 RX ORDER — GABAPENTIN 300 MG/1
300 CAPSULE ORAL 2 TIMES DAILY
Qty: 60 CAPSULE | Refills: 2 | Status: SHIPPED | OUTPATIENT
Start: 2024-04-09 | End: 2024-10-06

## 2024-04-09 SDOH — ECONOMIC STABILITY: FOOD INSECURITY: WITHIN THE PAST 12 MONTHS, THE FOOD YOU BOUGHT JUST DIDN'T LAST AND YOU DIDN'T HAVE MONEY TO GET MORE.: NEVER TRUE

## 2024-04-09 SDOH — ECONOMIC STABILITY: INCOME INSECURITY: HOW HARD IS IT FOR YOU TO PAY FOR THE VERY BASICS LIKE FOOD, HOUSING, MEDICAL CARE, AND HEATING?: NOT HARD AT ALL

## 2024-04-09 SDOH — ECONOMIC STABILITY: FOOD INSECURITY: WITHIN THE PAST 12 MONTHS, YOU WORRIED THAT YOUR FOOD WOULD RUN OUT BEFORE YOU GOT MONEY TO BUY MORE.: NEVER TRUE

## 2024-04-09 ASSESSMENT — ANXIETY QUESTIONNAIRES
1. FEELING NERVOUS, ANXIOUS, OR ON EDGE: NOT AT ALL
6. BECOMING EASILY ANNOYED OR IRRITABLE: NOT AT ALL
7. FEELING AFRAID AS IF SOMETHING AWFUL MIGHT HAPPEN: NOT AT ALL
IF YOU CHECKED OFF ANY PROBLEMS ON THIS QUESTIONNAIRE, HOW DIFFICULT HAVE THESE PROBLEMS MADE IT FOR YOU TO DO YOUR WORK, TAKE CARE OF THINGS AT HOME, OR GET ALONG WITH OTHER PEOPLE: NOT DIFFICULT AT ALL
2. NOT BEING ABLE TO STOP OR CONTROL WORRYING: NOT AT ALL
3. WORRYING TOO MUCH ABOUT DIFFERENT THINGS: NOT AT ALL
5. BEING SO RESTLESS THAT IT IS HARD TO SIT STILL: NOT AT ALL
GAD7 TOTAL SCORE: 0
4. TROUBLE RELAXING: NOT AT ALL

## 2024-04-09 ASSESSMENT — ENCOUNTER SYMPTOMS
CONSTIPATION: 1
DIARRHEA: 0
ABDOMINAL PAIN: 0
CHEST TIGHTNESS: 0
RHINORRHEA: 0
VOMITING: 0
NAUSEA: 0
SHORTNESS OF BREATH: 0
BACK PAIN: 1

## 2024-04-09 ASSESSMENT — PATIENT HEALTH QUESTIONNAIRE - PHQ9
1. LITTLE INTEREST OR PLEASURE IN DOING THINGS: NOT AT ALL
SUM OF ALL RESPONSES TO PHQ QUESTIONS 1-9: 0
SUM OF ALL RESPONSES TO PHQ9 QUESTIONS 1 & 2: 0
2. FEELING DOWN, DEPRESSED OR HOPELESS: NOT AT ALL
SUM OF ALL RESPONSES TO PHQ QUESTIONS 1-9: 0

## 2024-04-09 NOTE — PROGRESS NOTES
\"Have you been to the ER, urgent care clinic since your last visit?  Hospitalized since your last visit?\"    NO    “Have you seen or consulted any other health care providers outside of [facility] since your last visit?”    NO        “Have you had a colorectal cancer screening such as a colonoscopy/FIT/Cologuard?    NO    Date of last Colonoscopy: 12/11/2020  No cologuard on file  No FIT/FOBT on file   No flexible sigmoidoscopy on file         Click Here for Release of Records Request   
Creatinine Urine Ratio    gabapentin (NEURONTIN) 300 MG capsule      2. Essential (primary) hypertension  CBC with Auto Differential    Basic Metabolic Panel      3. Hyperlipidemia, unspecified hyperlipidemia type  Lipid Panel      4. Polyneuropathy, unspecified  gabapentin (NEURONTIN) 300 MG capsule      5. Radiculopathy, lumbar region  gabapentin (NEURONTIN) 300 MG capsule        HTN: BP is controlled on Micardis and labetalol.  .    T2DM: Patient is only taking glyburide -feels it works well for him.  A1c today is 7%.  Increase dose of glyburide.  Counseled on dietary changes-patient has been eating more candy.  Eye records requested.    Dyslipidemia: Recently started on pravastatin-will continue.  Check repeat lipid panel.    Neuropathy: Reports worsening symptoms and would like to resume gabapentin.        Refused all vaccines      Return in about 4 months (around 8/9/2024).     I asked the patient if he  had any questions and answered his  questions.  The patient stated that he understands the treatment plan and agrees with the treatment plan    This document was created with a voice activated dictation system and may contain transcription errors.

## 2024-05-14 DIAGNOSIS — I10 ESSENTIAL (PRIMARY) HYPERTENSION: ICD-10-CM

## 2024-05-17 RX ORDER — TELMISARTAN 40 MG/1
40 TABLET ORAL DAILY
Qty: 90 TABLET | Refills: 0 | Status: SHIPPED | OUTPATIENT
Start: 2024-05-17

## 2024-06-27 DIAGNOSIS — M54.16 RADICULOPATHY, LUMBAR REGION: ICD-10-CM

## 2024-06-27 DIAGNOSIS — M54.12 CERVICAL RADICULOPATHY: ICD-10-CM

## 2024-06-28 RX ORDER — IBUPROFEN 800 MG/1
800 TABLET ORAL 2 TIMES DAILY PRN
Qty: 60 TABLET | Refills: 0 | Status: SHIPPED | OUTPATIENT
Start: 2024-06-28

## 2024-07-25 ENCOUNTER — TELEPHONE (OUTPATIENT)
Facility: CLINIC | Age: 54
End: 2024-07-25

## 2024-07-26 ENCOUNTER — TELEPHONE (OUTPATIENT)
Facility: CLINIC | Age: 54
End: 2024-07-26

## 2024-07-29 ENCOUNTER — HOSPITAL ENCOUNTER (OUTPATIENT)
Facility: HOSPITAL | Age: 54
Setting detail: SPECIMEN
Discharge: HOME | End: 2024-08-01
Payer: COMMERCIAL

## 2024-07-29 DIAGNOSIS — E11.40 TYPE 2 DIABETES MELLITUS WITH DIABETIC NEUROPATHY, WITHOUT LONG-TERM CURRENT USE OF INSULIN (HCC): ICD-10-CM

## 2024-07-29 DIAGNOSIS — I10 ESSENTIAL (PRIMARY) HYPERTENSION: ICD-10-CM

## 2024-07-29 DIAGNOSIS — E78.5 HYPERLIPIDEMIA, UNSPECIFIED HYPERLIPIDEMIA TYPE: ICD-10-CM

## 2024-07-29 LAB
ANION GAP SERPL CALC-SCNC: 6 MMOL/L (ref 3–18)
BASOPHILS # BLD: 0.1 K/UL (ref 0–0.1)
BASOPHILS NFR BLD: 1 % (ref 0–2)
BUN SERPL-MCNC: 15 MG/DL (ref 7–18)
BUN/CREAT SERPL: 18 (ref 12–20)
CALCIUM SERPL-MCNC: 9.3 MG/DL (ref 8.5–10.1)
CHLORIDE SERPL-SCNC: 105 MMOL/L (ref 100–111)
CHOLEST SERPL-MCNC: 191 MG/DL
CO2 SERPL-SCNC: 26 MMOL/L (ref 21–32)
CREAT SERPL-MCNC: 0.84 MG/DL (ref 0.6–1.3)
CREAT UR-MCNC: 53 MG/DL (ref 30–125)
DIFFERENTIAL METHOD BLD: ABNORMAL
EOSINOPHIL # BLD: 0.5 K/UL (ref 0–0.4)
EOSINOPHIL NFR BLD: 5 % (ref 0–5)
ERYTHROCYTE [DISTWIDTH] IN BLOOD BY AUTOMATED COUNT: 12.8 % (ref 11.6–14.5)
EST. AVERAGE GLUCOSE BLD GHB EST-MCNC: 134 MG/DL
GLUCOSE SERPL-MCNC: 134 MG/DL (ref 74–99)
HBA1C MFR BLD: 6.3 % (ref 4.2–5.6)
HCT VFR BLD AUTO: 42.5 % (ref 36–48)
HDLC SERPL-MCNC: 41 MG/DL (ref 40–60)
HDLC SERPL: 4.7 (ref 0–5)
HGB BLD-MCNC: 14 G/DL (ref 13–16)
IMM GRANULOCYTES # BLD AUTO: 0 K/UL (ref 0–0.04)
IMM GRANULOCYTES NFR BLD AUTO: 0 % (ref 0–0.5)
LDLC SERPL CALC-MCNC: 131.8 MG/DL (ref 0–100)
LIPID PANEL: ABNORMAL
LYMPHOCYTES # BLD: 1.8 K/UL (ref 0.9–3.6)
LYMPHOCYTES NFR BLD: 20 % (ref 21–52)
MCH RBC QN AUTO: 28.7 PG (ref 24–34)
MCHC RBC AUTO-ENTMCNC: 32.9 G/DL (ref 31–37)
MCV RBC AUTO: 87.1 FL (ref 78–100)
MICROALBUMIN UR-MCNC: <0.5 MG/DL (ref 0–3)
MICROALBUMIN/CREAT UR-RTO: NORMAL MG/G (ref 0–30)
MONOCYTES # BLD: 0.7 K/UL (ref 0.05–1.2)
MONOCYTES NFR BLD: 8 % (ref 3–10)
NEUTS SEG # BLD: 5.8 K/UL (ref 1.8–8)
NEUTS SEG NFR BLD: 65 % (ref 40–73)
NRBC # BLD: 0 K/UL (ref 0–0.01)
NRBC BLD-RTO: 0 PER 100 WBC
PLATELET # BLD AUTO: 282 K/UL (ref 135–420)
PMV BLD AUTO: 10.3 FL (ref 9.2–11.8)
POTASSIUM SERPL-SCNC: 5 MMOL/L (ref 3.5–5.5)
RBC # BLD AUTO: 4.88 M/UL (ref 4.35–5.65)
SODIUM SERPL-SCNC: 137 MMOL/L (ref 136–145)
TRIGL SERPL-MCNC: 91 MG/DL
VLDLC SERPL CALC-MCNC: 18.2 MG/DL
WBC # BLD AUTO: 8.9 K/UL (ref 4.6–13.2)

## 2024-07-29 PROCEDURE — 82570 ASSAY OF URINE CREATININE: CPT

## 2024-07-29 PROCEDURE — 36415 COLL VENOUS BLD VENIPUNCTURE: CPT

## 2024-07-29 PROCEDURE — 83036 HEMOGLOBIN GLYCOSYLATED A1C: CPT

## 2024-07-29 PROCEDURE — 82043 UR ALBUMIN QUANTITATIVE: CPT

## 2024-07-29 PROCEDURE — 85025 COMPLETE CBC W/AUTO DIFF WBC: CPT

## 2024-07-29 PROCEDURE — 80061 LIPID PANEL: CPT

## 2024-07-29 PROCEDURE — 80048 BASIC METABOLIC PNL TOTAL CA: CPT

## 2024-08-08 DIAGNOSIS — I10 ESSENTIAL (PRIMARY) HYPERTENSION: ICD-10-CM

## 2024-08-12 ENCOUNTER — OFFICE VISIT (OUTPATIENT)
Facility: CLINIC | Age: 54
End: 2024-08-12
Payer: COMMERCIAL

## 2024-08-12 VITALS
WEIGHT: 208.8 LBS | RESPIRATION RATE: 16 BRPM | SYSTOLIC BLOOD PRESSURE: 105 MMHG | OXYGEN SATURATION: 96 % | BODY MASS INDEX: 34.79 KG/M2 | HEIGHT: 65 IN | HEART RATE: 78 BPM | DIASTOLIC BLOOD PRESSURE: 62 MMHG

## 2024-08-12 DIAGNOSIS — Z12.5 SCREENING FOR MALIGNANT NEOPLASM OF PROSTATE: ICD-10-CM

## 2024-08-12 DIAGNOSIS — E78.5 HYPERLIPIDEMIA, UNSPECIFIED HYPERLIPIDEMIA TYPE: ICD-10-CM

## 2024-08-12 DIAGNOSIS — I10 ESSENTIAL (PRIMARY) HYPERTENSION: ICD-10-CM

## 2024-08-12 DIAGNOSIS — G62.9 POLYNEUROPATHY, UNSPECIFIED: ICD-10-CM

## 2024-08-12 DIAGNOSIS — E11.40 TYPE 2 DIABETES MELLITUS WITH DIABETIC NEUROPATHY, WITHOUT LONG-TERM CURRENT USE OF INSULIN (HCC): Primary | ICD-10-CM

## 2024-08-12 DIAGNOSIS — M54.12 CERVICAL RADICULOPATHY: ICD-10-CM

## 2024-08-12 DIAGNOSIS — R42 VERTIGO: ICD-10-CM

## 2024-08-12 PROCEDURE — 3074F SYST BP LT 130 MM HG: CPT | Performed by: STUDENT IN AN ORGANIZED HEALTH CARE EDUCATION/TRAINING PROGRAM

## 2024-08-12 PROCEDURE — 3044F HG A1C LEVEL LT 7.0%: CPT | Performed by: STUDENT IN AN ORGANIZED HEALTH CARE EDUCATION/TRAINING PROGRAM

## 2024-08-12 PROCEDURE — 99214 OFFICE O/P EST MOD 30 MIN: CPT | Performed by: STUDENT IN AN ORGANIZED HEALTH CARE EDUCATION/TRAINING PROGRAM

## 2024-08-12 PROCEDURE — 3078F DIAST BP <80 MM HG: CPT | Performed by: STUDENT IN AN ORGANIZED HEALTH CARE EDUCATION/TRAINING PROGRAM

## 2024-08-12 RX ORDER — TIZANIDINE 2 MG/1
2 TABLET ORAL 3 TIMES DAILY PRN
Qty: 90 TABLET | Refills: 0 | Status: SHIPPED | OUTPATIENT
Start: 2024-08-12

## 2024-08-12 RX ORDER — MECLIZINE HYDROCHLORIDE 25 MG/1
25 TABLET ORAL 3 TIMES DAILY PRN
Qty: 30 TABLET | Refills: 0 | Status: SHIPPED | OUTPATIENT
Start: 2024-08-12 | End: 2024-08-22

## 2024-08-12 RX ORDER — TELMISARTAN 40 MG/1
40 TABLET ORAL DAILY
Qty: 90 TABLET | Refills: 1 | Status: SHIPPED | OUTPATIENT
Start: 2024-08-12

## 2024-08-12 SDOH — ECONOMIC STABILITY: INCOME INSECURITY: HOW HARD IS IT FOR YOU TO PAY FOR THE VERY BASICS LIKE FOOD, HOUSING, MEDICAL CARE, AND HEATING?: NOT VERY HARD

## 2024-08-12 SDOH — ECONOMIC STABILITY: FOOD INSECURITY: WITHIN THE PAST 12 MONTHS, YOU WORRIED THAT YOUR FOOD WOULD RUN OUT BEFORE YOU GOT MONEY TO BUY MORE.: NEVER TRUE

## 2024-08-12 SDOH — ECONOMIC STABILITY: FOOD INSECURITY: WITHIN THE PAST 12 MONTHS, THE FOOD YOU BOUGHT JUST DIDN'T LAST AND YOU DIDN'T HAVE MONEY TO GET MORE.: NEVER TRUE

## 2024-08-12 ASSESSMENT — ENCOUNTER SYMPTOMS
VOMITING: 0
BACK PAIN: 1
SHORTNESS OF BREATH: 0
CONSTIPATION: 1
DIARRHEA: 0
RHINORRHEA: 0
ABDOMINAL PAIN: 0
NAUSEA: 0
CHEST TIGHTNESS: 0

## 2024-08-12 ASSESSMENT — PATIENT HEALTH QUESTIONNAIRE - PHQ9
SUM OF ALL RESPONSES TO PHQ9 QUESTIONS 1 & 2: 0
SUM OF ALL RESPONSES TO PHQ QUESTIONS 1-9: 0
1. LITTLE INTEREST OR PLEASURE IN DOING THINGS: NOT AT ALL
SUM OF ALL RESPONSES TO PHQ QUESTIONS 1-9: 0
2. FEELING DOWN, DEPRESSED OR HOPELESS: NOT AT ALL

## 2024-08-12 ASSESSMENT — ANXIETY QUESTIONNAIRES
3. WORRYING TOO MUCH ABOUT DIFFERENT THINGS: NOT AT ALL
1. FEELING NERVOUS, ANXIOUS, OR ON EDGE: NOT AT ALL
4. TROUBLE RELAXING: NOT AT ALL
GAD7 TOTAL SCORE: 0
7. FEELING AFRAID AS IF SOMETHING AWFUL MIGHT HAPPEN: NOT AT ALL
2. NOT BEING ABLE TO STOP OR CONTROL WORRYING: NOT AT ALL
6. BECOMING EASILY ANNOYED OR IRRITABLE: NOT AT ALL
5. BEING SO RESTLESS THAT IT IS HARD TO SIT STILL: NOT AT ALL

## 2024-08-12 NOTE — PROGRESS NOTES
Eladio Mathews is a 53 y.o. male presenting today for Follow-up  .     Chief Complaint   Patient presents with    Follow-up       HPI:  Eladio Mathews presents to the office today for follow up.    Patient has a past medical history of HTN, HLD, T2DM, pulmonary hypertension, chronic LBBB.     Patient presented to the ED on 2/6/2023 with abdominal pain.  CT AP showed cholelithiasis but no acute findings.  He was also complaining of a headache so CT head was done which was negative.  Labs were normal aside from a slightly elevated WBC count at 11.2 with neutrophilia.  He was prescribed Bentyl and MiraLAX.    He has a history of constipation documented in prior visits to GI.  Has had intermittent rectal bleeding in the past due to hard stools.        Patient is also complaining of intermittent right-sided numbness and tingling.  CT head in ED was negative.  This is a chronic complaint since many years.     HTN: Taking Micardis and labetalol.  BP is controlled.    HLD: Patient stopped taking the Crestor-states that it made him feel strange.    He had nausea before with Lipitor.  Lipid panel in 7/2024 showed .8, HDL 41, triglyceride 91 patient was started on pravastatin which he has been tolerating.      T2DM: Currently on glyburide. He stopped takng Janumet - made him feel sluggish. He has peripheral neuropathy and was previously prescribed Neurontin but stopped taking it.  HbA1c was 6.3% in 7/2024..  No microalbuminuria noted.  He reports increasing numbness in his feet.  He was resumed on gabapentin with improvement..     Chronic LBBB: Following with cardiology.  He underwent a cardiac catheterization 2012 which did not show any significant coronary disease.  Nuclear stress stressed in 2017 did not show any ischemia.     He is complaining of back pain, numbness in bilateral feet.  He has a history of spinal stenosis.  Was previously following with neurosurgery but has not followed up.     Patient is following

## 2024-08-13 RX ORDER — TELMISARTAN 40 MG/1
40 TABLET ORAL DAILY
Qty: 90 TABLET | Refills: 0 | OUTPATIENT
Start: 2024-08-13

## 2024-08-13 RX ORDER — TIZANIDINE 2 MG/1
TABLET ORAL
Qty: 90 TABLET | Refills: 0 | OUTPATIENT
Start: 2024-08-13

## 2024-08-27 RX ORDER — LABETALOL 100 MG/1
TABLET, FILM COATED ORAL
Qty: 180 TABLET | Refills: 0 | Status: SHIPPED | OUTPATIENT
Start: 2024-08-27

## 2024-11-18 ENCOUNTER — OFFICE VISIT (OUTPATIENT)
Age: 54
End: 2024-11-18
Payer: COMMERCIAL

## 2024-11-18 VITALS
HEART RATE: 76 BPM | OXYGEN SATURATION: 98 % | SYSTOLIC BLOOD PRESSURE: 122 MMHG | WEIGHT: 214 LBS | DIASTOLIC BLOOD PRESSURE: 74 MMHG | HEIGHT: 65 IN | BODY MASS INDEX: 35.65 KG/M2

## 2024-11-18 DIAGNOSIS — I44.7 LBBB (LEFT BUNDLE BRANCH BLOCK): Primary | ICD-10-CM

## 2024-11-18 DIAGNOSIS — E11.40 TYPE 2 DIABETES MELLITUS WITH DIABETIC NEUROPATHY, WITHOUT LONG-TERM CURRENT USE OF INSULIN (HCC): ICD-10-CM

## 2024-11-18 DIAGNOSIS — I10 ESSENTIAL (PRIMARY) HYPERTENSION: ICD-10-CM

## 2024-11-18 DIAGNOSIS — E66.01 SEVERE OBESITY (BMI 35.0-39.9) WITH COMORBIDITY: ICD-10-CM

## 2024-11-18 PROCEDURE — 3078F DIAST BP <80 MM HG: CPT | Performed by: INTERNAL MEDICINE

## 2024-11-18 PROCEDURE — 3074F SYST BP LT 130 MM HG: CPT | Performed by: INTERNAL MEDICINE

## 2024-11-18 PROCEDURE — 99214 OFFICE O/P EST MOD 30 MIN: CPT | Performed by: INTERNAL MEDICINE

## 2024-11-18 PROCEDURE — 3044F HG A1C LEVEL LT 7.0%: CPT | Performed by: INTERNAL MEDICINE

## 2024-11-18 PROCEDURE — 93000 ELECTROCARDIOGRAM COMPLETE: CPT | Performed by: INTERNAL MEDICINE

## 2024-11-18 ASSESSMENT — ENCOUNTER SYMPTOMS
COUGH: 0
SORE THROAT: 0
VOMITING: 0
ABDOMINAL DISTENTION: 0
SHORTNESS OF BREATH: 0
NAUSEA: 0
ABDOMINAL PAIN: 0

## 2024-11-18 ASSESSMENT — ANXIETY QUESTIONNAIRES
4. TROUBLE RELAXING: NOT AT ALL
3. WORRYING TOO MUCH ABOUT DIFFERENT THINGS: NOT AT ALL
1. FEELING NERVOUS, ANXIOUS, OR ON EDGE: NOT AT ALL
7. FEELING AFRAID AS IF SOMETHING AWFUL MIGHT HAPPEN: NOT AT ALL
IF YOU CHECKED OFF ANY PROBLEMS ON THIS QUESTIONNAIRE, HOW DIFFICULT HAVE THESE PROBLEMS MADE IT FOR YOU TO DO YOUR WORK, TAKE CARE OF THINGS AT HOME, OR GET ALONG WITH OTHER PEOPLE: NOT DIFFICULT AT ALL
6. BECOMING EASILY ANNOYED OR IRRITABLE: NOT AT ALL
GAD7 TOTAL SCORE: 0
5. BEING SO RESTLESS THAT IT IS HARD TO SIT STILL: NOT AT ALL
2. NOT BEING ABLE TO STOP OR CONTROL WORRYING: NOT AT ALL

## 2024-11-18 ASSESSMENT — PATIENT HEALTH QUESTIONNAIRE - PHQ9
2. FEELING DOWN, DEPRESSED OR HOPELESS: NOT AT ALL
1. LITTLE INTEREST OR PLEASURE IN DOING THINGS: NOT AT ALL
SUM OF ALL RESPONSES TO PHQ QUESTIONS 1-9: 0
SUM OF ALL RESPONSES TO PHQ9 QUESTIONS 1 & 2: 0
SUM OF ALL RESPONSES TO PHQ QUESTIONS 1-9: 0

## 2024-11-18 NOTE — PROGRESS NOTES
Eladio Mathews presents today for   Chief Complaint   Patient presents with    Follow-up     1 year       Eladio QUIQUE Reid preferred language for health care discussion is english/other.    Is someone accompanying this pt? no    Is the patient using any DME equipment during OV? no    Depression Screening:  Depression: Not at risk (8/12/2024)    PHQ-2     PHQ-2 Score: 0        Learning Assessment:  No question data found.       Pt currently taking Anticoagulant therapy? no    Pt currently taking Antiplatelet therapy ? Aspirin 81 mg daily      Coordination of Care:  1. Have you been to the ER, urgent care clinic since your last visit? Hospitalized since your last visit? no    2. Have you seen or consulted any other health care providers outside of the [facility] since your last visit? Include any pap smears or colon screening. no    
bundle branch block.  Unchanged on EKG. the patient last underwent a cardiac catheterization in 2012 which did not show any significant coronary disease.  He then underwent a pharmacologic nuclear stress test in January 2017 which did not show any ischemia.  No new symptoms concerning for angina.    Essential hypertension.  Patient blood pressure appears to be well-controlled on his current medical regimen which includes labetalol and telmisartan, both of which I would continue.    Diabetes mellitus, type II.  This is been managed with oral agents.  Historically this has been poorly controlled.  However more recently this has been much better controlled according to the patient.    Dyslipidemia.  Patient is now taking pravastatin 20 mg daily.  This is being managed by his PCP.  His most recent lipid profile from July 2024 could be a little better: Total cholesterol 191, HDL 41, .  He will continue to focus on lifestyle changes.  He may benefit from a more potent statin such as atorvastatin or rosuvastatin.  I will defer this to his PCP.      Follow-up in 12 months, sooner if needed.        Daniel Skaggs MD

## 2024-12-10 DIAGNOSIS — E11.40 TYPE 2 DIABETES MELLITUS WITH DIABETIC NEUROPATHY, WITHOUT LONG-TERM CURRENT USE OF INSULIN (HCC): ICD-10-CM

## 2024-12-10 DIAGNOSIS — G62.9 POLYNEUROPATHY, UNSPECIFIED: ICD-10-CM

## 2024-12-10 DIAGNOSIS — M54.16 RADICULOPATHY, LUMBAR REGION: ICD-10-CM

## 2024-12-10 DIAGNOSIS — M54.12 CERVICAL RADICULOPATHY: ICD-10-CM

## 2024-12-10 RX ORDER — IBUPROFEN 800 MG/1
800 TABLET, FILM COATED ORAL 2 TIMES DAILY PRN
Qty: 60 TABLET | Refills: 0 | Status: SHIPPED | OUTPATIENT
Start: 2024-12-10

## 2024-12-10 RX ORDER — GABAPENTIN 300 MG/1
300 CAPSULE ORAL 2 TIMES DAILY
Qty: 60 CAPSULE | Refills: 2 | Status: SHIPPED | OUTPATIENT
Start: 2024-12-10 | End: 2025-03-10

## 2024-12-10 RX ORDER — TIZANIDINE 2 MG/1
TABLET ORAL
Qty: 90 TABLET | Refills: 0 | Status: SHIPPED | OUTPATIENT
Start: 2024-12-10

## 2024-12-19 NOTE — TELEPHONE ENCOUNTER
Former KAMRAN AGUERO patient; Patient is scheduled for appointment on 8/12/22 @ 8:00 am    Requested Prescriptions     Pending Prescriptions Disp Refills    telmisartan (MICARDIS) 40 mg tablet [Pharmacy Med Name: Telmisartan 40 MG Oral Tablet] 30 Tablet 0     Sig: TAKE 1 TABLET BY MOUTH ONCE DAILY (STOP  VALSARTAN)    glyBURIDE (DIABETA) 2.5 mg tablet [Pharmacy Med Name: glyBURIDE 2.5 MG Oral Tablet] 90 Tablet 0     Sig: TAKE 1 TABLET BY MOUTH ONCE DAILY BEFORE BREAKFAST
Pt was contacted to get labs done before appointment. VM was full will attempt again.
[Syncope] : syncope

## 2025-01-02 RX ORDER — LABETALOL 100 MG/1
TABLET, FILM COATED ORAL
Qty: 180 TABLET | Refills: 0 | Status: SHIPPED | OUTPATIENT
Start: 2025-01-02

## 2025-02-10 DIAGNOSIS — I10 ESSENTIAL (PRIMARY) HYPERTENSION: ICD-10-CM

## 2025-02-11 RX ORDER — TELMISARTAN 40 MG/1
40 TABLET ORAL DAILY
Qty: 90 TABLET | Refills: 0 | Status: SHIPPED | OUTPATIENT
Start: 2025-02-11

## 2025-02-14 ENCOUNTER — HOSPITAL ENCOUNTER (OUTPATIENT)
Facility: HOSPITAL | Age: 55
Discharge: HOME | End: 2025-02-17
Payer: COMMERCIAL

## 2025-02-14 DIAGNOSIS — E11.40 TYPE 2 DIABETES MELLITUS WITH DIABETIC NEUROPATHY, WITHOUT LONG-TERM CURRENT USE OF INSULIN (HCC): ICD-10-CM

## 2025-02-14 DIAGNOSIS — Z12.5 SCREENING FOR MALIGNANT NEOPLASM OF PROSTATE: ICD-10-CM

## 2025-02-14 DIAGNOSIS — I10 ESSENTIAL (PRIMARY) HYPERTENSION: ICD-10-CM

## 2025-02-14 LAB
ALBUMIN SERPL-MCNC: 4 G/DL (ref 3.4–5)
ALBUMIN/GLOB SERPL: 1.4 (ref 0.8–1.7)
ALP SERPL-CCNC: 65 U/L (ref 45–117)
ALT SERPL-CCNC: 37 U/L (ref 16–61)
ANION GAP SERPL CALC-SCNC: 5 MMOL/L (ref 3–18)
AST SERPL-CCNC: 16 U/L (ref 10–38)
BILIRUB SERPL-MCNC: 0.7 MG/DL (ref 0.2–1)
BUN SERPL-MCNC: 16 MG/DL (ref 7–18)
BUN/CREAT SERPL: 20 (ref 12–20)
CALCIUM SERPL-MCNC: 9 MG/DL (ref 8.5–10.1)
CHLORIDE SERPL-SCNC: 103 MMOL/L (ref 100–111)
CO2 SERPL-SCNC: 27 MMOL/L (ref 21–32)
CREAT SERPL-MCNC: 0.79 MG/DL (ref 0.6–1.3)
CREAT UR-MCNC: 86 MG/DL (ref 30–125)
EST. AVERAGE GLUCOSE BLD GHB EST-MCNC: 154 MG/DL
GLOBULIN SER CALC-MCNC: 2.9 G/DL (ref 2–4)
GLUCOSE SERPL-MCNC: 132 MG/DL (ref 74–99)
HBA1C MFR BLD: 7 % (ref 4.2–5.6)
MICROALBUMIN UR-MCNC: 0.63 MG/DL (ref 0–3)
MICROALBUMIN/CREAT UR-RTO: 7 MG/G (ref 0–30)
POTASSIUM SERPL-SCNC: 4.2 MMOL/L (ref 3.5–5.5)
PROT SERPL-MCNC: 6.9 G/DL (ref 6.4–8.2)
PSA SERPL-MCNC: 0.5 NG/ML (ref 0–4)
SODIUM SERPL-SCNC: 135 MMOL/L (ref 136–145)

## 2025-02-14 PROCEDURE — 82570 ASSAY OF URINE CREATININE: CPT

## 2025-02-14 PROCEDURE — 80053 COMPREHEN METABOLIC PANEL: CPT

## 2025-02-14 PROCEDURE — 36415 COLL VENOUS BLD VENIPUNCTURE: CPT

## 2025-02-14 PROCEDURE — G0103 PSA SCREENING: HCPCS

## 2025-02-14 PROCEDURE — 82043 UR ALBUMIN QUANTITATIVE: CPT

## 2025-02-14 PROCEDURE — 83036 HEMOGLOBIN GLYCOSYLATED A1C: CPT

## 2025-02-25 ENCOUNTER — OFFICE VISIT (OUTPATIENT)
Facility: CLINIC | Age: 55
End: 2025-02-25
Payer: COMMERCIAL

## 2025-02-25 ENCOUNTER — TELEPHONE (OUTPATIENT)
Facility: CLINIC | Age: 55
End: 2025-02-25

## 2025-02-25 VITALS
BODY MASS INDEX: 36.42 KG/M2 | HEIGHT: 65 IN | OXYGEN SATURATION: 98 % | SYSTOLIC BLOOD PRESSURE: 117 MMHG | DIASTOLIC BLOOD PRESSURE: 62 MMHG | WEIGHT: 218.6 LBS | HEART RATE: 83 BPM

## 2025-02-25 DIAGNOSIS — R42 VERTIGO: ICD-10-CM

## 2025-02-25 DIAGNOSIS — I10 ESSENTIAL (PRIMARY) HYPERTENSION: ICD-10-CM

## 2025-02-25 DIAGNOSIS — G62.9 POLYNEUROPATHY, UNSPECIFIED: ICD-10-CM

## 2025-02-25 DIAGNOSIS — E78.5 HYPERLIPIDEMIA, UNSPECIFIED HYPERLIPIDEMIA TYPE: ICD-10-CM

## 2025-02-25 DIAGNOSIS — E11.40 TYPE 2 DIABETES MELLITUS WITH DIABETIC NEUROPATHY, WITHOUT LONG-TERM CURRENT USE OF INSULIN (HCC): Primary | ICD-10-CM

## 2025-02-25 DIAGNOSIS — L85.3 DRY SKIN DERMATITIS: ICD-10-CM

## 2025-02-25 DIAGNOSIS — M54.16 RADICULOPATHY, LUMBAR REGION: ICD-10-CM

## 2025-02-25 PROCEDURE — 3078F DIAST BP <80 MM HG: CPT | Performed by: STUDENT IN AN ORGANIZED HEALTH CARE EDUCATION/TRAINING PROGRAM

## 2025-02-25 PROCEDURE — 3051F HG A1C>EQUAL 7.0%<8.0%: CPT | Performed by: STUDENT IN AN ORGANIZED HEALTH CARE EDUCATION/TRAINING PROGRAM

## 2025-02-25 PROCEDURE — 3074F SYST BP LT 130 MM HG: CPT | Performed by: STUDENT IN AN ORGANIZED HEALTH CARE EDUCATION/TRAINING PROGRAM

## 2025-02-25 PROCEDURE — 99214 OFFICE O/P EST MOD 30 MIN: CPT | Performed by: STUDENT IN AN ORGANIZED HEALTH CARE EDUCATION/TRAINING PROGRAM

## 2025-02-25 RX ORDER — AMMONIUM LACTATE 12 G/100G
CREAM TOPICAL
Qty: 385 G | Refills: 4 | Status: SHIPPED | OUTPATIENT
Start: 2025-02-25 | End: 2025-03-27

## 2025-02-25 SDOH — ECONOMIC STABILITY: FOOD INSECURITY: WITHIN THE PAST 12 MONTHS, YOU WORRIED THAT YOUR FOOD WOULD RUN OUT BEFORE YOU GOT MONEY TO BUY MORE.: NEVER TRUE

## 2025-02-25 SDOH — ECONOMIC STABILITY: FOOD INSECURITY: WITHIN THE PAST 12 MONTHS, THE FOOD YOU BOUGHT JUST DIDN'T LAST AND YOU DIDN'T HAVE MONEY TO GET MORE.: NEVER TRUE

## 2025-02-25 ASSESSMENT — ENCOUNTER SYMPTOMS
NAUSEA: 0
CONSTIPATION: 1
BACK PAIN: 1
SHORTNESS OF BREATH: 0
ABDOMINAL PAIN: 0
DIARRHEA: 0
CHEST TIGHTNESS: 0
RHINORRHEA: 0
VOMITING: 0

## 2025-02-25 ASSESSMENT — PATIENT HEALTH QUESTIONNAIRE - PHQ9
2. FEELING DOWN, DEPRESSED OR HOPELESS: NOT AT ALL
SUM OF ALL RESPONSES TO PHQ QUESTIONS 1-9: 0
SUM OF ALL RESPONSES TO PHQ QUESTIONS 1-9: 0
SUM OF ALL RESPONSES TO PHQ9 QUESTIONS 1 & 2: 0
SUM OF ALL RESPONSES TO PHQ QUESTIONS 1-9: 0
SUM OF ALL RESPONSES TO PHQ QUESTIONS 1-9: 0
1. LITTLE INTEREST OR PLEASURE IN DOING THINGS: NOT AT ALL

## 2025-02-25 NOTE — PROGRESS NOTES
Eladio Mathews is a 54 y.o. male presenting today for Follow-up  .     Chief Complaint   Patient presents with    Follow-up       HPI:  Eladio Mathews presents to the office today for follow up.    Patient has a past medical history of HTN, HLD, T2DM, pulmonary hypertension, chronic LBBB.     Patient presented to the ED on 2/6/2023 with abdominal pain.  CT AP showed cholelithiasis but no acute findings.  He was also complaining of a headache so CT head was done which was negative.  Labs were normal aside from a slightly elevated WBC count at 11.2 with neutrophilia.  He was prescribed Bentyl and MiraLAX.    He has a history of constipation documented in prior visits to GI.  Has had intermittent rectal bleeding in the past due to hard stools.        Patient is also complaining of intermittent right-sided numbness and tingling.  CT head in ED was negative.  This is a chronic complaint since many years.     HTN: Taking Micardis and labetalol.  BP is controlled.    HLD: Patient stopped taking the Crestor-states that it made him feel strange.    He had nausea before with Lipitor.  Lipid panel in 7/2024 showed .8, HDL 41, triglyceride 91 patient was started on pravastatin which he has been tolerating.      T2DM: Currently on glyburide. He stopped takng Janumet - made him feel sluggish. He has peripheral neuropathy and was previously prescribed Neurontin but stopped taking it.  HbA1c increased to 7% in 2/2025.  No microalbuminuria noted.  He reports increasing numbness in his feet.  He was resumed on gabapentin with improvement..  Today-patient is complaining of worsening numbness in his right foot.  He is also complaining of dryness in bilateral feet.  Recent A1c was increased to 7%.  Admits to having more sugar recently.     Chronic LBBB: Following with cardiology.  He underwent a cardiac catheterization 2012 which did not show any significant coronary disease.  Nuclear stress stressed in 2017 did not show any

## 2025-02-25 NOTE — TELEPHONE ENCOUNTER
Parent pickup up line, advised that we will try call son, due to son didn't have my chart, for use to send ticket

## 2025-04-23 DIAGNOSIS — M54.12 CERVICAL RADICULOPATHY: ICD-10-CM

## 2025-04-23 DIAGNOSIS — E11.40 TYPE 2 DIABETES MELLITUS WITH DIABETIC NEUROPATHY, WITHOUT LONG-TERM CURRENT USE OF INSULIN (HCC): ICD-10-CM

## 2025-04-23 DIAGNOSIS — I10 ESSENTIAL (PRIMARY) HYPERTENSION: ICD-10-CM

## 2025-04-25 RX ORDER — GLYBURIDE 5 MG/1
5 TABLET ORAL 2 TIMES DAILY WITH MEALS
Qty: 60 TABLET | Refills: 4 | Status: SHIPPED | OUTPATIENT
Start: 2025-04-25

## 2025-04-25 RX ORDER — TIZANIDINE 2 MG/1
TABLET ORAL
Qty: 90 TABLET | Refills: 0 | Status: SHIPPED | OUTPATIENT
Start: 2025-04-25

## 2025-04-25 RX ORDER — LABETALOL 100 MG/1
TABLET, FILM COATED ORAL
Qty: 180 TABLET | Refills: 1 | Status: SHIPPED | OUTPATIENT
Start: 2025-04-25

## 2025-04-25 RX ORDER — TELMISARTAN 40 MG/1
40 TABLET ORAL DAILY
Qty: 90 TABLET | Refills: 1 | Status: SHIPPED | OUTPATIENT
Start: 2025-04-25

## 2025-06-23 DIAGNOSIS — M54.16 RADICULOPATHY, LUMBAR REGION: ICD-10-CM

## 2025-06-23 DIAGNOSIS — E11.40 TYPE 2 DIABETES MELLITUS WITH DIABETIC NEUROPATHY, WITHOUT LONG-TERM CURRENT USE OF INSULIN (HCC): ICD-10-CM

## 2025-06-23 DIAGNOSIS — G62.9 POLYNEUROPATHY, UNSPECIFIED: ICD-10-CM

## 2025-06-27 RX ORDER — GABAPENTIN 300 MG/1
CAPSULE ORAL
Qty: 60 CAPSULE | Refills: 2 | Status: SHIPPED | OUTPATIENT
Start: 2025-06-27 | End: 2025-09-25

## 2025-07-31 ENCOUNTER — TELEPHONE (OUTPATIENT)
Facility: CLINIC | Age: 55
End: 2025-07-31

## 2025-08-20 ENCOUNTER — HOSPITAL ENCOUNTER (OUTPATIENT)
Facility: HOSPITAL | Age: 55
Setting detail: SPECIMEN
Discharge: HOME | End: 2025-08-23
Payer: COMMERCIAL

## 2025-08-20 DIAGNOSIS — E11.40 TYPE 2 DIABETES MELLITUS WITH DIABETIC NEUROPATHY, WITHOUT LONG-TERM CURRENT USE OF INSULIN (HCC): ICD-10-CM

## 2025-08-20 DIAGNOSIS — E78.5 HYPERLIPIDEMIA, UNSPECIFIED HYPERLIPIDEMIA TYPE: ICD-10-CM

## 2025-08-20 DIAGNOSIS — I10 ESSENTIAL (PRIMARY) HYPERTENSION: ICD-10-CM

## 2025-08-20 LAB
ALBUMIN SERPL-MCNC: 4.1 G/DL (ref 3.4–5)
ALBUMIN/GLOB SERPL: 1.6 (ref 0.8–1.7)
ALP SERPL-CCNC: 71 U/L (ref 45–117)
ALT SERPL-CCNC: 43 U/L (ref 10–50)
ANION GAP SERPL CALC-SCNC: 12 MMOL/L (ref 3–18)
AST SERPL-CCNC: 25 U/L (ref 10–38)
BASOPHILS # BLD: 0.05 K/UL (ref 0–0.1)
BASOPHILS NFR BLD: 0.7 % (ref 0–2)
BILIRUB SERPL-MCNC: 0.4 MG/DL (ref 0.2–1)
BUN SERPL-MCNC: 13 MG/DL (ref 6–23)
BUN/CREAT SERPL: 16 (ref 12–20)
CALCIUM SERPL-MCNC: 9.6 MG/DL (ref 8.5–10.1)
CHLORIDE SERPL-SCNC: 101 MMOL/L (ref 98–107)
CHOLEST SERPL-MCNC: 225 MG/DL
CO2 SERPL-SCNC: 24 MMOL/L (ref 21–32)
CREAT SERPL-MCNC: 0.85 MG/DL (ref 0.6–1.3)
CREAT UR-MCNC: 104 MG/DL (ref 30–125)
DIFFERENTIAL METHOD BLD: NORMAL
EOSINOPHIL # BLD: 0.3 K/UL (ref 0–0.4)
EOSINOPHIL NFR BLD: 4.1 % (ref 0–5)
ERYTHROCYTE [DISTWIDTH] IN BLOOD BY AUTOMATED COUNT: 12.2 % (ref 11.6–14.5)
EST. AVERAGE GLUCOSE BLD GHB EST-MCNC: 196 MG/DL
GLOBULIN SER CALC-MCNC: 2.6 G/DL (ref 2–4)
GLUCOSE SERPL-MCNC: 213 MG/DL (ref 74–108)
HBA1C MFR BLD: 8.5 % (ref 4.2–5.6)
HCT VFR BLD AUTO: 44.2 % (ref 36–48)
HDLC SERPL-MCNC: 38 MG/DL (ref 40–60)
HDLC SERPL: 6 (ref 0–5)
HGB BLD-MCNC: 15 G/DL (ref 13–16)
IMM GRANULOCYTES # BLD AUTO: 0.03 K/UL (ref 0–0.04)
IMM GRANULOCYTES NFR BLD AUTO: 0.4 % (ref 0–0.5)
LDLC SERPL CALC-MCNC: 158 MG/DL (ref 0–100)
LYMPHOCYTES # BLD: 1.8 K/UL (ref 0.9–3.6)
LYMPHOCYTES NFR BLD: 24.4 % (ref 21–52)
MCH RBC QN AUTO: 29.9 PG (ref 24–34)
MCHC RBC AUTO-ENTMCNC: 33.9 G/DL (ref 31–37)
MCV RBC AUTO: 88 FL (ref 78–100)
MICROALBUMIN UR-MCNC: <1.2 MG/DL (ref 0–3)
MICROALBUMIN/CREAT UR-RTO: NORMAL MG/G (ref 0–30)
MONOCYTES # BLD: 0.64 K/UL (ref 0.05–1.2)
MONOCYTES NFR BLD: 8.7 % (ref 3–10)
NEUTS SEG # BLD: 4.56 K/UL (ref 1.8–8)
NEUTS SEG NFR BLD: 61.7 % (ref 40–73)
NRBC # BLD: 0 K/UL (ref 0–0.01)
NRBC BLD-RTO: 0 PER 100 WBC
PLATELET # BLD AUTO: 249 K/UL (ref 135–420)
PMV BLD AUTO: 10.7 FL (ref 9.2–11.8)
POTASSIUM SERPL-SCNC: 4.8 MMOL/L (ref 3.5–5.5)
PROT SERPL-MCNC: 6.8 G/DL (ref 6.4–8.2)
RBC # BLD AUTO: 5.02 M/UL (ref 4.35–5.65)
SODIUM SERPL-SCNC: 136 MMOL/L (ref 136–145)
TRIGL SERPL-MCNC: 149 MG/DL (ref 0–150)
VLDLC SERPL CALC-MCNC: 30 MG/DL
WBC # BLD AUTO: 7.4 K/UL (ref 4.6–13.2)

## 2025-08-20 PROCEDURE — 83036 HEMOGLOBIN GLYCOSYLATED A1C: CPT

## 2025-08-20 PROCEDURE — 80053 COMPREHEN METABOLIC PANEL: CPT

## 2025-08-20 PROCEDURE — 82043 UR ALBUMIN QUANTITATIVE: CPT

## 2025-08-20 PROCEDURE — 80061 LIPID PANEL: CPT

## 2025-08-20 PROCEDURE — 85025 COMPLETE CBC W/AUTO DIFF WBC: CPT

## 2025-08-20 PROCEDURE — 82570 ASSAY OF URINE CREATININE: CPT

## 2025-08-20 PROCEDURE — 36415 COLL VENOUS BLD VENIPUNCTURE: CPT

## 2025-08-27 ENCOUNTER — OFFICE VISIT (OUTPATIENT)
Facility: CLINIC | Age: 55
End: 2025-08-27
Payer: COMMERCIAL

## 2025-08-27 VITALS
SYSTOLIC BLOOD PRESSURE: 136 MMHG | HEART RATE: 79 BPM | DIASTOLIC BLOOD PRESSURE: 82 MMHG | BODY MASS INDEX: 37.65 KG/M2 | OXYGEN SATURATION: 95 % | WEIGHT: 226 LBS | HEIGHT: 65 IN

## 2025-08-27 DIAGNOSIS — I10 ESSENTIAL (PRIMARY) HYPERTENSION: ICD-10-CM

## 2025-08-27 DIAGNOSIS — M54.16 RADICULOPATHY, LUMBAR REGION: ICD-10-CM

## 2025-08-27 DIAGNOSIS — E78.5 HYPERLIPIDEMIA, UNSPECIFIED HYPERLIPIDEMIA TYPE: ICD-10-CM

## 2025-08-27 DIAGNOSIS — G62.9 POLYNEUROPATHY, UNSPECIFIED: ICD-10-CM

## 2025-08-27 DIAGNOSIS — E11.40 TYPE 2 DIABETES MELLITUS WITH DIABETIC NEUROPATHY, WITHOUT LONG-TERM CURRENT USE OF INSULIN (HCC): Primary | ICD-10-CM

## 2025-08-27 DIAGNOSIS — M54.12 CERVICAL RADICULOPATHY: ICD-10-CM

## 2025-08-27 PROCEDURE — G2211 COMPLEX E/M VISIT ADD ON: HCPCS | Performed by: STUDENT IN AN ORGANIZED HEALTH CARE EDUCATION/TRAINING PROGRAM

## 2025-08-27 PROCEDURE — 3052F HG A1C>EQUAL 8.0%<EQUAL 9.0%: CPT | Performed by: STUDENT IN AN ORGANIZED HEALTH CARE EDUCATION/TRAINING PROGRAM

## 2025-08-27 PROCEDURE — 3075F SYST BP GE 130 - 139MM HG: CPT | Performed by: STUDENT IN AN ORGANIZED HEALTH CARE EDUCATION/TRAINING PROGRAM

## 2025-08-27 PROCEDURE — 3079F DIAST BP 80-89 MM HG: CPT | Performed by: STUDENT IN AN ORGANIZED HEALTH CARE EDUCATION/TRAINING PROGRAM

## 2025-08-27 PROCEDURE — 99214 OFFICE O/P EST MOD 30 MIN: CPT | Performed by: STUDENT IN AN ORGANIZED HEALTH CARE EDUCATION/TRAINING PROGRAM

## 2025-08-27 RX ORDER — TELMISARTAN 40 MG/1
40 TABLET ORAL DAILY
Qty: 90 TABLET | Refills: 1 | Status: SHIPPED | OUTPATIENT
Start: 2025-08-27

## 2025-08-27 RX ORDER — GABAPENTIN 300 MG/1
CAPSULE ORAL
Qty: 60 CAPSULE | Refills: 2 | Status: SHIPPED | OUTPATIENT
Start: 2025-08-27 | End: 2025-11-25

## 2025-08-27 RX ORDER — LABETALOL 100 MG/1
TABLET, FILM COATED ORAL
Qty: 180 TABLET | Refills: 1 | Status: SHIPPED | OUTPATIENT
Start: 2025-08-27

## 2025-08-27 RX ORDER — GLYBURIDE 5 MG/1
5 TABLET ORAL 2 TIMES DAILY WITH MEALS
Qty: 60 TABLET | Refills: 4 | Status: SHIPPED | OUTPATIENT
Start: 2025-08-27

## 2025-08-27 SDOH — ECONOMIC STABILITY: FOOD INSECURITY: WITHIN THE PAST 12 MONTHS, YOU WORRIED THAT YOUR FOOD WOULD RUN OUT BEFORE YOU GOT MONEY TO BUY MORE.: NEVER TRUE

## 2025-08-27 SDOH — ECONOMIC STABILITY: FOOD INSECURITY: WITHIN THE PAST 12 MONTHS, THE FOOD YOU BOUGHT JUST DIDN'T LAST AND YOU DIDN'T HAVE MONEY TO GET MORE.: NEVER TRUE

## 2025-08-27 ASSESSMENT — ENCOUNTER SYMPTOMS
ABDOMINAL PAIN: 0
SHORTNESS OF BREATH: 0
VOMITING: 0
RHINORRHEA: 0
BACK PAIN: 1
CHEST TIGHTNESS: 0
NAUSEA: 0
CONSTIPATION: 1
DIARRHEA: 0

## 2025-08-27 ASSESSMENT — PATIENT HEALTH QUESTIONNAIRE - PHQ9
SUM OF ALL RESPONSES TO PHQ QUESTIONS 1-9: 0
2. FEELING DOWN, DEPRESSED OR HOPELESS: NOT AT ALL
SUM OF ALL RESPONSES TO PHQ QUESTIONS 1-9: 0
1. LITTLE INTEREST OR PLEASURE IN DOING THINGS: NOT AT ALL

## (undated) DEVICE — 3M™ BAIR PAWS FLEX™ WARMING GOWN, STANDARD, 20 PER CASE 81003: Brand: BAIR PAWS™

## (undated) DEVICE — KIT CLN UP BON SECOURS MARYV

## (undated) DEVICE — (D)SYR 10ML 1/5ML GRAD NSAF -- PKGING CHANGE USE ITEM 338027

## (undated) DEVICE — SKIN CLOS DERMABND PRINEO 60CM -- DERMABOUND PRINEO

## (undated) DEVICE — SOLUTION IV 1000ML 0.9% SOD CHL

## (undated) DEVICE — (D)PREP SKN CHLRAPRP APPL 26ML -- CONVERT TO ITEM 371833

## (undated) DEVICE — GEL BAG FOR WRAPS --

## (undated) DEVICE — AIRLIFE™ NASAL OXYGEN CANNULA CURVED, NONFLARED TIP WITH 14 FOOT (4.3 M) CRUSH-RESISTANT TUBING, OVER-THE-EAR STYLE: Brand: AIRLIFE™

## (undated) DEVICE — 3.0MM PRECISION NEURO (MATCH HEAD)

## (undated) DEVICE — FLEX ADVANTAGE 3000CC: Brand: FLEX ADVANTAGE

## (undated) DEVICE — REM POLYHESIVE ADULT PATIENT RETURN ELECTRODE: Brand: VALLEYLAB

## (undated) DEVICE — 3M™ TEGADERM™ TRANSPARENT FILM DRESSING FRAME STYLE, 1626W, 4 IN X 4-3/4 IN (10 CM X 12 CM), 50/CT 4CT/CASE: Brand: 3M™ TEGADERM™

## (undated) DEVICE — INTENDED FOR TISSUE SEPARATION, AND OTHER PROCEDURES THAT REQUIRE A SHARP SURGICAL BLADE TO PUNCTURE OR CUT.: Brand: BARD-PARKER SAFETY BLADES SIZE 15, STERILE

## (undated) DEVICE — NEEDLE HYPO 22GA L1.5IN BLK S STL HUB POLYPR SHLD REG BVL

## (undated) DEVICE — SUTURE VCRL SZ 1 L18IN ABSRB VLT CT-1 L36MM 1/2 CIR J741D

## (undated) DEVICE — INTENDED FOR TISSUE SEPARATION, AND OTHER PROCEDURES THAT REQUIRE A SHARP SURGICAL BLADE TO PUNCTURE OR CUT.: Brand: BARD-PARKER SAFETY BLADES SIZE 20, STERILE

## (undated) DEVICE — WAX SURG 2.5GM HEMSTAT BNE BEESWAX PARAFFIN ISO PALMITATE

## (undated) DEVICE — SUTURE MCRYL SZ 3-0 L27IN ABSRB UD L24MM PS-1 3/8 CIR PRIM Y936H

## (undated) DEVICE — SUTURE VCRL SZ 2-0 L18IN ABSRB VLT CT-1 L36MM 1/2 CIR J739D

## (undated) DEVICE — WRAP COMPR BK

## (undated) DEVICE — SPONGE LAP 18X18IN STRL -- 5/PK

## (undated) DEVICE — STERILE POLYISOPRENE POWDER-FREE SURGICAL GLOVES: Brand: PROTEXIS

## (undated) DEVICE — Device

## (undated) DEVICE — KIT POS W/ FOAM ARM CRADL SHEARGUARD CHST PD CVR FOR SPNL

## (undated) DEVICE — 3M™ WARMING BLANKET, UPPER BODY, 10 PER CASE, 42268: Brand: BAIR HUGGER™